# Patient Record
Sex: FEMALE | Race: WHITE | NOT HISPANIC OR LATINO | Employment: FULL TIME | URBAN - METROPOLITAN AREA
[De-identification: names, ages, dates, MRNs, and addresses within clinical notes are randomized per-mention and may not be internally consistent; named-entity substitution may affect disease eponyms.]

---

## 2017-04-05 ENCOUNTER — APPOINTMENT (OUTPATIENT)
Dept: GENERAL RADIOLOGY | Facility: CLINIC | Age: 37
End: 2017-04-05
Attending: EMERGENCY MEDICINE
Payer: OTHER MISCELLANEOUS

## 2017-04-05 ENCOUNTER — HOSPITAL ENCOUNTER (EMERGENCY)
Facility: CLINIC | Age: 37
Discharge: HOME | End: 2017-04-05
Attending: EMERGENCY MEDICINE | Admitting: EMERGENCY MEDICINE
Payer: OTHER MISCELLANEOUS

## 2017-04-05 VITALS
HEART RATE: 52 BPM | SYSTOLIC BLOOD PRESSURE: 108 MMHG | DIASTOLIC BLOOD PRESSURE: 59 MMHG | OXYGEN SATURATION: 100 % | TEMPERATURE: 98.6 F | RESPIRATION RATE: 16 BRPM

## 2017-04-05 DIAGNOSIS — S90.30XA CONTUSION OF FOOT, UNSPECIFIED LATERALITY, INITIAL ENCOUNTER: ICD-10-CM

## 2017-04-05 PROCEDURE — 73630 X-RAY EXAM OF FOOT: CPT | Mod: RT

## 2017-04-05 PROCEDURE — 99283 EMERGENCY DEPT VISIT LOW MDM: CPT

## 2017-04-05 NOTE — ED AVS SNAPSHOT
[facility] Emergency Department    [address]    [facility] [address]    Phone:  [phone]    Fax:  [phone]                                       Andressa Lazar   MRN: [id number]    Department:  [facility] Emergency Department   Date of Visit:  4/5/2017           After Visit Summary Signature Page     I have received my discharge instructions, and my questions have been answered. I have discussed any challenges I see with this plan with the nurse or doctor.    ..........................................................................................................................................  Patient/Patient Representative Signature      ..........................................................................................................................................  Patient Representative Print Name and Relationship to Patient    ..................................................               ................................................  Date                                            Time    ..........................................................................................................................................  Reviewed by Signature/Title    ...................................................              ..............................................  Date                                                            Time

## 2017-04-05 NOTE — LETTER
[facility] EMERGENCY DEPARTMENT  [address] [facility] [address]  [phone]    Andressa Lazar  [address] [facility] [address]  [phone] (home)     : 1980      To Whom it may concern:    Andressa Lazar was seen in our Emergency Department today, 2017.  I expect her condition to improve over the next 2 days.  She may return to work when improved.    Sincerely,        KAYA Lopez RN for KODI Hernandez MD

## 2017-04-05 NOTE — ED AVS SNAPSHOT
[facility] Emergency Department    [address] [place] [address]    Phone:  [phone]    Fax:  [phone]                                       Andressa Lazar   MRN: [id number]    Department:  [facility] Emergency Department   Date of Visit:  4/5/2017           Patient Information     Date Of Birth          1980        Your diagnoses for this visit were:     Contusion of foot, unspecified laterality, initial encounter        You were seen by Eusebio Hernandez MD.      Follow-up Information     Follow up with Clinic, [facility]. Call in 1 week.    Contact information:    [address]  [facility] [address]  [phone]          Discharge Instructions         Foot Contusion  You have a contusion. This is also called a bruise. There is swelling and some bleeding under the skin, but no broken bones. This injury generally takes a few days to a few weeks to heal.  During that time, the bruise will typically change in color from reddish, to purple-blue, to greenish-yellow, then to yellow-brown.  Home care    Elevate the foot to reduce pain and swelling. As much as possible, sit or lie down with the foot raised about the level of your heart. This is especially important during the first 48 hours.    Ice the foot to help reduce pain and swelling. Wrap a cold source (ice pack or ice cubes in a plastic bag) in a thin towel. Apply to the bruised area for 20 minutes every 1 to 2 hours the first day. Continue this 3 to 4 times a day until the pain and swelling goes away.    Unless another medication was prescribed, you can take acetaminophen, ibuprofen, or naproxen to control pain. (If you have chronic liver or kidney disease or ever had a stomach ulcer or GI bleeding, talk with your doctor before using these medicines.)  Follow up  Follow up with your health care provider or our staff as advised. Call if you are not improving within 1 to 2  weeks.  When to seek medical advice   Call your health care provider right away if you have any of the following:    Increased pain or swelling    Foot or leg becomes cold, blue, numb or tingly    Signs of infection: Warmth, drainage, or increased redness or pain around the bruise    Inability to move the injured foot     Frequent bruising for unknown reasons    5559-0953 [employer]. [address] [place] [address]. All rights reserved. This information is not intended as a substitute for professional medical care. Always follow your healthcare professional's instructions.        Discharge Instructions  Extremity Injury    You were seen today for an injury to an extremity (arm, hand, leg, or foot). You may have a bruise, strain, or fracture (broken bone).    Return to the Emergency Department or see your regular doctor if your injured area is not back to normal within 5-7 days.    Return to the Emergency Department right away if:    Your pain seems to change or get worse or there is pain in a new area.    Your extremity becomes pale, cool, blue, or numb or tingling past the injury.    You have more drainage, redness or pain in the area of the cut or abrasion.    You have pain that you can t control with the medicine recommended or prescribed here, or you have pain that seems too much for your injury.    Your child will not stop crying or is much more fussy than normal.    You have new symptoms or anything that worries you.    What to Expect:    Your swelling and pain may be worse the day after your injury, but should not be severe and should start getting better after that. You should not have new symptoms and your pain should not get worse.    You may start to get a bruise over the injured area or below the injured area.    Your movement and strength should get better with time.    Some injuries may not show up until after you have left the Emergency Department so it is important to  follow-up with your regular doctor.    Your injury may prevent you from working.  Follow-up with your regular doctor to get a work release note.    Pain medications or your injury may make it unsafe to drive or operate machinery.    Home Care:    Apply ice your injured area for 15 minutes at a time, at least 3 times a day. Use a cloth between the ice bag and your skin to prevent frostbite.     Do not sleep with an ice pack or heating pad on, since this can cause burns or skin injury.    Rest your injured area for at least 1-2 days. After that you may start using your extremity again as long as there is not too much pain.     Raise the injured area above the level of your heart as much as possible in the first 1-2 days.    Use Tylenol  (acetaminophen), Motrin (ibuprofen), or Advil  (ibuprofen) for your pain unless you have an allergy or are told not to use these medications by your doctor.  Take the medications as instructed on the package. Tylenol  (acetaminophen) is in many prescription medicines and non-prescription medicines--check all of your medicines to be sure you aren t taking more than 3000 mg per day.    You may use an elastic bandage (Ace  Wrap) if it makes you more comfortable. Wrap it just tight enough to provide light compression, like a new pair of socks feels. Loosen the bandage if you have swelling past the bandage.      Please follow any other instructions that were discussed with you by your doctor.    MORE INFORMATION:    X-rays:  X-rays done today were read by your doctor but will also be read by a radiologist.  We will contact you if the radiologist sees anything different on the x-ray.  Your regular doctor may also want to review your x-rays on follow-up.    You could have a fracture (break), even if we told you your x-rays were normal. X-rays are not always certain, and some fractures are hard to see and may not show up right away.  Also, your x-ray may look like you have a fracture, even  though you do not.  It is important to follow-up with your regular doctor.     Stretching:  If your injury was to your arm or shoulder and your doctor put you in a sling or an immobilizer, it is important that you take off your immobilizer within 3 days and stretch/move your shoulder, unless your doctor specifically tells you to not move your shoulder.  This is to prevent further injury such as a  frozen shoulder .     If you were given a prescription for medicine here today, be sure to read all of the information (including the package insert) that comes with your prescription.  This will include important information about the medicine, its side effects, and any warnings that you need to know about.  The pharmacist who fills the prescription can provide more information and answer questions you may have about the medicine.  If you have questions or concerns that the pharmacist cannot address, please call or return to the Emergency Department.     Opioid Medication Information    Pain medications are among the most commonly prescribed medicines, so we are including this information for all our patients. If you did not receive pain medication or get a prescription for pain medicine, you can ignore it.     You may have been given a prescription for an opioid (narcotic) pain medicine and/or have received a pain medicine while here in the Emergency Department. These medicines can make you drowsy or impaired. You must not drive, operate dangerous equipment, or engage in any other dangerous activities while taking these medications. If you drive while taking these medications, you could be arrested for DUI, or driving under the influence. Do not drink any alcohol while you are taking these medications.     Opioid pain medications can cause addiction. If you have a history of chemical dependency of any type, you are at a higher risk of becoming addicted to pain medications.  Only take these prescribed medications to treat  your pain when all other options have been tried. Take it for as short a time and as few doses as possible. Store your pain pills in a secure place, as they are frequently stolen and provide a dangerous opportunity for children or visitors in your house to start abusing these powerful medications. We will not replace any lost or stolen medicine.  As soon as your pain is better, you should flush all your remaining medication.     Many prescription pain medications contain Tylenol  (acetaminophen), including Vicodin , Tylenol #3 , Norco , Lortab , and Percocet .  You should not take any extra pills of Tylenol  if you are using these prescription medications or you can get very sick.  Do not ever take more than 3000 mg of acetaminophen in any 24 hour period.    All opioids tend to cause constipation. Drink plenty of water and eat foods that have a lot of fiber, such as fruits, vegetables, prune juice, apple juice and high fiber cereal.  Take a laxative if you don t move your bowels at least every other day. Miralax , Milk of Magnesia, Colace , or Senna  can be used to keep you regular.      Remember that you can always come back to the Emergency Department if you are not able to see your regular doctor in the amount of time listed above, if you get any new symptoms, or if there is anything that worries you.        24 Hour Appointment Hotline       To make an appointment at any [facility], call [phone] ([phone]). If you don't have a family doctor or clinic, we will help you find one. [facility] clinics are conveniently located to serve the needs of you and your family.             Review of your medicines      Notice     You have not been prescribed any medications.            Procedures and tests performed during your visit     Foot  XR, G/E 3 views, right      Orders Needing Specimen Collection     None      Pending Results     Date and Time Order Name Status Description    4/5/2017 2121 Foot  XR, G/E  3 views, right Preliminary             Pending Culture Results     No orders found from 4/3/2017 to 4/6/2017.            Test Results From Your Hospital Stay        4/5/2017 11:07 PM      Narrative     XR FOOT RT G/E 3 VW  4/5/2017 11:00 PM      HISTORY: Dropped metal panel on foot.     COMPARISON: None.        Impression     IMPRESSION: No acute fracture or dislocation.                Clinical Quality Measure: Blood Pressure Screening     Your blood pressure was checked while you were in the emergency department today. The last reading we obtained was  BP: 108/59 . Please read the guidelines below about what these numbers mean and what you should do about them.  If your systolic blood pressure (the top number) is less than 120 and your diastolic blood pressure (the bottom number) is less than 80, then your blood pressure is normal. There is nothing more that you need to do about it.  If your systolic blood pressure (the top number) is 120-139 or your diastolic blood pressure (the bottom number) is 80-89, your blood pressure may be higher than it should be. You should have your blood pressure rechecked within a year by a primary care provider.  If your systolic blood pressure (the top number) is 140 or greater or your diastolic blood pressure (the bottom number) is 90 or greater, you may have high blood pressure. High blood pressure is treatable, but if left untreated over time it can put you at risk for heart attack, stroke, or kidney failure. You should have your blood pressure rechecked by a primary care provider within the next 4 weeks.  If your provider in the emergency department today gave you specific instructions to follow-up with your doctor or provider even sooner than that, you should follow that instruction and not wait for up to 4 weeks for your follow-up visit.        Thank you for choosing [facility]       Thank you for choosing [facility] for your care. Our goal is always to provide you with excellent  "care. Hearing back from our patients is one way we can continue to improve our services. Please take a few minutes to complete the written survey that you may receive in the mail after you visit with us. Thank you!        [employer] Information     [employer] lets you send messages to your doctor, view your test results, renew your prescriptions, schedule appointments and more. To sign up, go to [email or web address] . Click on \"Log in\" on the left side of the screen, which will take you to the Welcome page. Then click on \"Sign up Now\" on the right side of the page.     You will be asked to enter the access code listed below, as well as some personal information. Please follow the directions to create your username and password.     Your access code is: [email or web address]  Expires: 2017 11:21 PM     Your access code will  in 90 days. If you need help or a new code, please call your [facility] clinic or [phone].        Care EveryWhere ID     This is your Care EveryWhere ID. This could be used by other organizations to access your [facility] medical records  [id number]        After Visit Summary       This is your record. Keep this with you and show to your community pharmacist(s) and doctor(s) at your next visit.                  "

## 2017-04-06 NOTE — ED PROVIDER NOTES
History     Chief Complaint:  Foot Injury      HPI   Andressa Lazar is a 36 year old female who presents with right foot injury. The patient states that she dropped a steel panel on her right foot while at work this evening. The patient is able to walk.    Allergies:  No known drug allergies.    Medications:    The patient is currently on no regular medications.    Past Medical History:    History reviewed.  No significant past medical history.     Past Surgical History:    History reviewed. No pertinent past surgical history.    Family History:    History reviewed. No pertinent family history.    Social History:  Marital Status: Single  Presents to the ED alone  [facility]     Review of Systems   Musculoskeletal:        Positive for right foot pain.   All other systems reviewed and are negative.    Physical Exam   First Vitals:  BP: 108/59  Pulse: 52  Temp: 98.6  F (37  C)  Resp: 16  SpO2: 100 %    Physical Exam  Constitutional:  Oriented to person, place, and time. Well appearing.  HENT:   Head:    Normocephalic.   Mouth/Throat:   Oropharynx is clear and moist.   Eyes:    EOM are normal. Pupils are equal, round, and reactive to light.   Neck:    Neck supple.   Cardiovascular:  Normal rate, regular rhythm and normal heart sounds.      Exam reveals no gallop and no friction rub.       No murmur heard.  Pulmonary/Chest:  Effort normal and breath sounds normal.      No respiratory distress. No wheezes. No rales.      No reproducible chest wall pain.  Abdominal:   Soft. No distension. No tenderness. No rebound and no guarding.   Musculoskeletal:  Normal range of motion.     2+ distal pulses.     Tenderness to dorsum of right foot, 2-4 MTP, full range of motion  Neurological:   Right foot neurovascularly intact.     Alert and oriented to person, place, and time.           Moves all 4 extremities spontaneously    Skin:    Mild ecchymosis to right foot.     No rash noted. No  pallor.    Emergency Department Course   Imaging:  Radiographic findings were communicated with the patient who voiced understanding of the findings.    Foot  XR, G/E 3 views, right   Preliminary Result   IMPRESSION: No acute fracture or dislocation.        ED Course:  Nursing notes and past medical history reviewed.   I performed a physical examination of the patient as documented above.  I explained the plan with the patient who consents to this.   The patient was sent for a right foot x-ray while in the emergency department, findings above.   Findings and plan explained to the patient. Patient discharged home with instructions regarding supportive care, medications, and reasons to return. The importance of close follow-up was reviewed.     Impression & Plan    Medical Decision Making:  Andressa Lazar is a 36 year old female presents for evaluation after right foot injury.  Signs and symptoms are consistent with a contusion.  A broad differential was considered including sprain, strain, fracture, tendon rupture, nerve impingement/compromise, referred pain. Supportive outpatient management is indicated.  Rest, ice, and elevation treatment was discussed with the patient. The patients head to toe trauma exam is otherwise negative for serious underlying disease of the head, neck, chest, abdomen, extremities, pelvis.  Close follow-up with patient's primary care physician per discharge precautions. Contusion discharge instructions given for home.    Diagnosis:    ICD-10-CM    1. Contusion of foot, unspecified laterality, initial encounter S90.30XA        Disposition:   Discharge to home.    Albin JAUREGUI, am serving as a scribe on 4/5/2017 at 10:50 PM to personally document services performed by Eusebio Hernandez MD, based on my observations and the provider's statements to me.       Eusebio Hernandez MD  04/05/17 5558

## 2017-04-06 NOTE — DISCHARGE INSTRUCTIONS
Foot Contusion  You have a contusion. This is also called a bruise. There is swelling and some bleeding under the skin, but no broken bones. This injury generally takes a few days to a few weeks to heal.  During that time, the bruise will typically change in color from reddish, to purple-blue, to greenish-yellow, then to yellow-brown.  Home care    Elevate the foot to reduce pain and swelling. As much as possible, sit or lie down with the foot raised about the level of your heart. This is especially important during the first 48 hours.    Ice the foot to help reduce pain and swelling. Wrap a cold source (ice pack or ice cubes in a plastic bag) in a thin towel. Apply to the bruised area for 20 minutes every 1 to 2 hours the first day. Continue this 3 to 4 times a day until the pain and swelling goes away.    Unless another medication was prescribed, you can take acetaminophen, ibuprofen, or naproxen to control pain. (If you have chronic liver or kidney disease or ever had a stomach ulcer or GI bleeding, talk with your doctor before using these medicines.)  Follow up  Follow up with your health care provider or our staff as advised. Call if you are not improving within 1 to 2 weeks.  When to seek medical advice   Call your health care provider right away if you have any of the following:    Increased pain or swelling    Foot or leg becomes cold, blue, numb or tingly    Signs of infection: Warmth, drainage, or increased redness or pain around the bruise    Inability to move the injured foot     Frequent bruising for unknown reasons    3331-6237 [employer]. [address] [place] [address], [place] [address]. All rights reserved. This information is not intended as a substitute for professional medical care. Always follow your healthcare professional's instructions.        Discharge Instructions  Extremity Injury    You were seen today for an injury to an extremity (arm, hand, leg, or foot). You may have a  bruise, strain, or fracture (broken bone).    Return to the Emergency Department or see your regular doctor if your injured area is not back to normal within 5-7 days.    Return to the Emergency Department right away if:    Your pain seems to change or get worse or there is pain in a new area.    Your extremity becomes pale, cool, blue, or numb or tingling past the injury.    You have more drainage, redness or pain in the area of the cut or abrasion.    You have pain that you can t control with the medicine recommended or prescribed here, or you have pain that seems too much for your injury.    Your child will not stop crying or is much more fussy than normal.    You have new symptoms or anything that worries you.    What to Expect:    Your swelling and pain may be worse the day after your injury, but should not be severe and should start getting better after that. You should not have new symptoms and your pain should not get worse.    You may start to get a bruise over the injured area or below the injured area.    Your movement and strength should get better with time.    Some injuries may not show up until after you have left the Emergency Department so it is important to follow-up with your regular doctor.    Your injury may prevent you from working.  Follow-up with your regular doctor to get a work release note.    Pain medications or your injury may make it unsafe to drive or operate machinery.    Home Care:    Apply ice your injured area for 15 minutes at a time, at least 3 times a day. Use a cloth between the ice bag and your skin to prevent frostbite.     Do not sleep with an ice pack or heating pad on, since this can cause burns or skin injury.    Rest your injured area for at least 1-2 days. After that you may start using your extremity again as long as there is not too much pain.     Raise the injured area above the level of your heart as much as possible in the first 1-2 days.    Use Tylenol   (acetaminophen), Motrin (ibuprofen), or Advil  (ibuprofen) for your pain unless you have an allergy or are told not to use these medications by your doctor.  Take the medications as instructed on the package. Tylenol  (acetaminophen) is in many prescription medicines and non-prescription medicines--check all of your medicines to be sure you aren t taking more than 3000 mg per day.    You may use an elastic bandage (Ace  Wrap) if it makes you more comfortable. Wrap it just tight enough to provide light compression, like a new pair of socks feels. Loosen the bandage if you have swelling past the bandage.      Please follow any other instructions that were discussed with you by your doctor.    MORE INFORMATION:    X-rays:  X-rays done today were read by your doctor but will also be read by a radiologist.  We will contact you if the radiologist sees anything different on the x-ray.  Your regular doctor may also want to review your x-rays on follow-up.    You could have a fracture (break), even if we told you your x-rays were normal. X-rays are not always certain, and some fractures are hard to see and may not show up right away.  Also, your x-ray may look like you have a fracture, even though you do not.  It is important to follow-up with your regular doctor.     Stretching:  If your injury was to your arm or shoulder and your doctor put you in a sling or an immobilizer, it is important that you take off your immobilizer within 3 days and stretch/move your shoulder, unless your doctor specifically tells you to not move your shoulder.  This is to prevent further injury such as a  frozen shoulder .     If you were given a prescription for medicine here today, be sure to read all of the information (including the package insert) that comes with your prescription.  This will include important information about the medicine, its side effects, and any warnings that you need to know about.  The pharmacist who fills the  prescription can provide more information and answer questions you may have about the medicine.  If you have questions or concerns that the pharmacist cannot address, please call or return to the Emergency Department.     Opioid Medication Information    Pain medications are among the most commonly prescribed medicines, so we are including this information for all our patients. If you did not receive pain medication or get a prescription for pain medicine, you can ignore it.     You may have been given a prescription for an opioid (narcotic) pain medicine and/or have received a pain medicine while here in the Emergency Department. These medicines can make you drowsy or impaired. You must not drive, operate dangerous equipment, or engage in any other dangerous activities while taking these medications. If you drive while taking these medications, you could be arrested for DUI, or driving under the influence. Do not drink any alcohol while you are taking these medications.     Opioid pain medications can cause addiction. If you have a history of chemical dependency of any type, you are at a higher risk of becoming addicted to pain medications.  Only take these prescribed medications to treat your pain when all other options have been tried. Take it for as short a time and as few doses as possible. Store your pain pills in a secure place, as they are frequently stolen and provide a dangerous opportunity for children or visitors in your house to start abusing these powerful medications. We will not replace any lost or stolen medicine.  As soon as your pain is better, you should flush all your remaining medication.     Many prescription pain medications contain Tylenol  (acetaminophen), including Vicodin , Tylenol #3 , Norco , Lortab , and Percocet .  You should not take any extra pills of Tylenol  if you are using these prescription medications or you can get very sick.  Do not ever take more than 3000 mg of  acetaminophen in any 24 hour period.    All opioids tend to cause constipation. Drink plenty of water and eat foods that have a lot of fiber, such as fruits, vegetables, prune juice, apple juice and high fiber cereal.  Take a laxative if you don t move your bowels at least every other day. Miralax , Milk of Magnesia, Colace , or Senna  can be used to keep you regular.      Remember that you can always come back to the Emergency Department if you are not able to see your regular doctor in the amount of time listed above, if you get any new symptoms, or if there is anything that worries you.

## 2019-03-18 LAB
HBV SURFACE AG SERPL QL IA: NORMAL
HIV 1+2 AB+HIV1 P24 AG SERPL QL IA: NEGATIVE
RUBELLA ANTIBODY IGG QUANTITATIVE: NORMAL IU/ML
TREPONEMA ANTIBODIES: NEGATIVE

## 2019-09-06 LAB — GROUP B STREP PCR: NEGATIVE

## 2019-09-25 ENCOUNTER — HOSPITAL ENCOUNTER (INPATIENT)
Facility: CLINIC | Age: 39
LOS: 5 days | Discharge: HOME | End: 2019-09-30
Attending: OBSTETRICS & GYNECOLOGY | Admitting: OBSTETRICS & GYNECOLOGY
Payer: COMMERCIAL

## 2019-09-25 PROBLEM — O24.419 GESTATIONAL DIABETES MELLITUS (GDM) IN THIRD TRIMESTER: Status: ACTIVE | Noted: 2019-09-25

## 2019-09-25 LAB
ABO + RH BLD: NORMAL
ABO + RH BLD: NORMAL
AMPHETAMINES UR QL SCN: NEGATIVE
CANNABINOIDS UR QL: NEGATIVE
COCAINE UR QL: NEGATIVE
GLUCOSE BLDC GLUCOMTR-MCNC: 100 MG/DL (ref 70–99)
GLUCOSE BLDC GLUCOMTR-MCNC: 123 MG/DL (ref 70–99)
GLUCOSE BLDC GLUCOMTR-MCNC: 130 MG/DL (ref 70–99)
OPIATES UR QL SCN: NEGATIVE
PCP UR QL SCN: NEGATIVE
SPECIMEN EXP DATE BLD: NORMAL

## 2019-09-25 PROCEDURE — 00000146 ZZHCL STATISTIC GLUCOSE BY METER IP

## 2019-09-25 PROCEDURE — 86900 BLOOD TYPING SEROLOGIC ABO: CPT | Performed by: OBSTETRICS & GYNECOLOGY

## 2019-09-25 PROCEDURE — 80307 DRUG TEST PRSMV CHEM ANLYZR: CPT | Performed by: OBSTETRICS & GYNECOLOGY

## 2019-09-25 PROCEDURE — 12000000 ZZH R&B MED SURG/OB

## 2019-09-25 PROCEDURE — 25000132 ZZH RX MED GY IP 250 OP 250 PS 637: Performed by: OBSTETRICS & GYNECOLOGY

## 2019-09-25 PROCEDURE — 86780 TREPONEMA PALLIDUM: CPT | Performed by: OBSTETRICS & GYNECOLOGY

## 2019-09-25 PROCEDURE — 25000131 ZZH RX MED GY IP 250 OP 636 PS 637: Performed by: OBSTETRICS & GYNECOLOGY

## 2019-09-25 PROCEDURE — 86901 BLOOD TYPING SEROLOGIC RH(D): CPT | Performed by: OBSTETRICS & GYNECOLOGY

## 2019-09-25 RX ORDER — DEXTROSE MONOHYDRATE 25 G/50ML
25-50 INJECTION, SOLUTION INTRAVENOUS
Status: CANCELLED | OUTPATIENT
Start: 2019-09-25

## 2019-09-25 RX ORDER — OXYCODONE AND ACETAMINOPHEN 5; 325 MG/1; MG/1
1 TABLET ORAL
Status: DISCONTINUED | OUTPATIENT
Start: 2019-09-25 | End: 2019-09-28

## 2019-09-25 RX ORDER — ALBUTEROL SULFATE 90 UG/1
1-2 AEROSOL, METERED RESPIRATORY (INHALATION)
COMMUNITY
Start: 2018-10-10 | End: 2024-07-30

## 2019-09-25 RX ORDER — IBUPROFEN 800 MG/1
800 TABLET, FILM COATED ORAL
Status: DISCONTINUED | OUTPATIENT
Start: 2019-09-25 | End: 2019-09-28

## 2019-09-25 RX ORDER — FLUTICASONE PROPIONATE 50 MCG
1-2 SPRAY, SUSPENSION (ML) NASAL
COMMUNITY
Start: 2015-11-13 | End: 2024-07-30

## 2019-09-25 RX ORDER — DEXTROSE, SODIUM CHLORIDE, SODIUM LACTATE, POTASSIUM CHLORIDE, AND CALCIUM CHLORIDE 5; .6; .31; .03; .02 G/100ML; G/100ML; G/100ML; G/100ML; G/100ML
INJECTION, SOLUTION INTRAVENOUS CONTINUOUS
Status: CANCELLED | OUTPATIENT
Start: 2019-09-25

## 2019-09-25 RX ORDER — ONDANSETRON 2 MG/ML
4 INJECTION INTRAMUSCULAR; INTRAVENOUS EVERY 6 HOURS PRN
Status: DISCONTINUED | OUTPATIENT
Start: 2019-09-25 | End: 2019-09-29 | Stop reason: CLARIF

## 2019-09-25 RX ORDER — NICOTINE POLACRILEX 4 MG
15-30 LOZENGE BUCCAL
Status: DISCONTINUED | OUTPATIENT
Start: 2019-09-25 | End: 2019-09-27

## 2019-09-25 RX ORDER — METHYLERGONOVINE MALEATE 0.2 MG/ML
200 INJECTION INTRAVENOUS
Status: DISCONTINUED | OUTPATIENT
Start: 2019-09-25 | End: 2019-09-29 | Stop reason: CLARIF

## 2019-09-25 RX ORDER — MISOPROSTOL 100 UG/1
25 TABLET ORAL
Status: DISCONTINUED | OUTPATIENT
Start: 2019-09-25 | End: 2019-09-27

## 2019-09-25 RX ORDER — OXYTOCIN/0.9 % SODIUM CHLORIDE 30/500 ML
100-340 PLASTIC BAG, INJECTION (ML) INTRAVENOUS CONTINUOUS PRN
Status: DISCONTINUED | OUTPATIENT
Start: 2019-09-25 | End: 2019-09-28

## 2019-09-25 RX ORDER — SODIUM CHLORIDE 9 MG/ML
INJECTION, SOLUTION INTRAVENOUS CONTINUOUS
Status: CANCELLED | OUTPATIENT
Start: 2019-09-25

## 2019-09-25 RX ORDER — NALOXONE HYDROCHLORIDE 0.4 MG/ML
.1-.4 INJECTION, SOLUTION INTRAMUSCULAR; INTRAVENOUS; SUBCUTANEOUS
Status: DISCONTINUED | OUTPATIENT
Start: 2019-09-25 | End: 2019-09-29 | Stop reason: CLARIF

## 2019-09-25 RX ORDER — SODIUM CHLORIDE, SODIUM LACTATE, POTASSIUM CHLORIDE, CALCIUM CHLORIDE 600; 310; 30; 20 MG/100ML; MG/100ML; MG/100ML; MG/100ML
INJECTION, SOLUTION INTRAVENOUS CONTINUOUS
Status: DISCONTINUED | OUTPATIENT
Start: 2019-09-25 | End: 2019-09-28

## 2019-09-25 RX ORDER — DEXTROSE MONOHYDRATE 25 G/50ML
25-50 INJECTION, SOLUTION INTRAVENOUS
Status: DISCONTINUED | OUTPATIENT
Start: 2019-09-25 | End: 2019-09-27

## 2019-09-25 RX ORDER — NICOTINE POLACRILEX 4 MG
15-30 LOZENGE BUCCAL
Status: CANCELLED | OUTPATIENT
Start: 2019-09-25

## 2019-09-25 RX ORDER — CARBOPROST TROMETHAMINE 250 UG/ML
250 INJECTION, SOLUTION INTRAMUSCULAR
Status: DISCONTINUED | OUTPATIENT
Start: 2019-09-25 | End: 2019-09-29 | Stop reason: CLARIF

## 2019-09-25 RX ORDER — OXYTOCIN 10 [USP'U]/ML
10 INJECTION, SOLUTION INTRAMUSCULAR; INTRAVENOUS
Status: DISCONTINUED | OUTPATIENT
Start: 2019-09-25 | End: 2019-09-28

## 2019-09-25 RX ORDER — PRENATAL WITH FERROUS FUM AND FOLIC ACID 3080; 920; 120; 400; 22; 1.84; 3; 20; 10; 1; 12; 200; 27; 25; 2 [IU]/1; [IU]/1; MG/1; [IU]/1; MG/1; MG/1; MG/1; MG/1; MG/1; MG/1; UG/1; MG/1; MG/1; MG/1; MG/1
1 TABLET ORAL
COMMUNITY
Start: 2019-01-28 | End: 2024-07-30

## 2019-09-25 RX ORDER — FENTANYL CITRATE 50 UG/ML
50-100 INJECTION, SOLUTION INTRAMUSCULAR; INTRAVENOUS
Status: DISCONTINUED | OUTPATIENT
Start: 2019-09-25 | End: 2019-09-28

## 2019-09-25 RX ORDER — ACETAMINOPHEN 325 MG/1
650 TABLET ORAL EVERY 4 HOURS PRN
Status: DISCONTINUED | OUTPATIENT
Start: 2019-09-25 | End: 2019-09-28

## 2019-09-25 RX ADMIN — Medication 25 MCG: at 17:32

## 2019-09-25 RX ADMIN — INSULIN HUMAN 8 UNITS: 100 INJECTION, SOLUTION PARENTERAL at 17:58

## 2019-09-25 RX ADMIN — Medication 25 MCG: at 09:34

## 2019-09-25 RX ADMIN — Medication 25 MCG: at 15:30

## 2019-09-25 RX ADMIN — Medication 25 MCG: at 13:33

## 2019-09-25 RX ADMIN — Medication 25 MCG: at 19:38

## 2019-09-25 RX ADMIN — Medication 25 MCG: at 11:31

## 2019-09-25 RX ADMIN — Medication 25 MCG: at 21:54

## 2019-09-25 RX ADMIN — INSULIN HUMAN 76 UNITS: 100 INJECTION, SUSPENSION SUBCUTANEOUS at 22:25

## 2019-09-25 SDOH — HEALTH STABILITY: MENTAL HEALTH: HOW OFTEN DO YOU HAVE A DRINK CONTAINING ALCOHOL?: NEVER

## 2019-09-25 ASSESSMENT — MIFFLIN-ST. JEOR: SCORE: 1769.51

## 2019-09-25 NOTE — PROVIDER NOTIFICATION
09/25/19 0922   Provider Notification   Provider Name/Title Dr Araiza   Method of Notification In Department   Notification Reason Patient Arrived;Status Update;SVE;Uterine Activity     MD updated on SVE  Order for cytotec x12 doses

## 2019-09-25 NOTE — PLAN OF CARE
Data: [facility] presented to [facility]: 2019  8:06 AM.  Reason for maternal/fetal assessment is scheduled induction-ripening for GDM. Patient reports no complaints.  Patient is a .  Prenatal record reviewed. Pregnancy  has been complicated by gestational diabetes, insulin controlled and AMA-first pregnancy.  Gestational Age 39w1d. VSS. Fetal movement present. Patient denies uterine contractions, leaking of vaginal fluid/rupture of membranes, vaginal bleeding, abdominal pain, pelvic pressure, vomiting, headache, visual disturbances, epigastric or URQ pain, significant edema. Support person is present-significant other Giuliano.  Action: Verbal consent for EFM. Triage assessment completed. Bill of rights reviewed.  Response: Patient verbalized agreement with plan. Will contact Dr Geno Araiza with update and further orders.

## 2019-09-25 NOTE — PROGRESS NOTES
Reviewed shoulder dystocia in detail including her risk factors, what would happen if the situation would occur, and the precautions we would have in place and that overall it is not possible to predict or prevent them from occurring but that we try to be prepared as best we can be    Ashley Hieronimus, MD [facility]  9/25/2019 1:32 PM

## 2019-09-25 NOTE — H&P
[facility] Labor and Delivery History and Physical    Andressa Lazar MRN# [id number]   Age: 38 year old YOB: 1980     Date of Admission:  2019         CC:    Here for induction of labor         HPI:   Andressa Lazar is a 38 year old  at 39w1d by LMP c/w 7w6d US who presents today for scheduled induction of labor secondary to GDMA2. Patient was scheduled to come in last night for cervical ripening but was pushed back until today secondary to L&D being too busy to accommodate her last night. She feels well today. Denies contractions, vaginal bleeding or loss of fluid. Normal fetal movement. Beyond diabetes, pregnancy is additionally complicated by AMA and obesity.          Pregnancy history:     OBSTETRIC HISTORY:    OB History    Para Term  AB Living   1 0 0 0 0 0   SAB TAB Ectopic Multiple Live Births   0 0 0 0 0      # Outcome Date GA Lbr Miles/2nd Weight Sex Delivery Anes PTL Lv   1 Current                Prenatal Labs:   Blood Type: A+  Rubella: Immune  HIV: NR  Hep B Surface Ag: NR  Syphilis: NR x 2  GCT: Failed, also failed GCT  Last Hgb: 12.6 on 2019    GBS Status:   Negative    Medication Prior to Admission  No medications prior to admission.   .        Maternal Past Medical History:     Past Medical History:   Diagnosis Date     Diabetes (H)     Gestational on insulin     Uncomplicated asthma     in the past                       Family History:   History reviewed. No pertinent family history.         Social History:     Social History     Tobacco Use     Smoking status: Former Smoker     Packs/day: 0.00     Smokeless tobacco: Never Used     Tobacco comment: quit in early pregnancy   Substance Use Topics     Alcohol use: Never     Frequency: Never     Drug use: Yes     Types: Marijuana     Comment: quit THC in early pregnancy            Review of Systems:   Negative except as noted above in the HPI         Physical Exam:     Vitals:    19 0843 19  "0846   BP:  118/61   Resp: 18    Temp: 98.1  F (36.7  C)    TempSrc: Oral    Weight: 108.9 kg (240 lb)    Height: 1.651 m (5' 5\")      Cardio: RRR  Resp: No m/g/r  Abdomen: gravid, soft, nt  Cervix: 0/50/-3   Presentation: Cephalic by BPP US on   Membranes: Intact  Fetal Heart Rate Tracin, moderate, no decels  Tocometer: No contractions    Imaging:  Dating Ultrasound:   Date: 2019 GA: 7w6d ALIE: 10/1/2019 Findings: Single IUP, +FCA    Anatomy Ultrasound:   Date: 2019 GA: 24w2d ALIE: Same. Findings: Single living IUP. Normal anatomy. Normal fluid.  Placenta location: anterior    Last Growth Ultrasound  Date: 2019 GA: 36w6d EFW: 83.3 (3382 grams) AC>99%. Normal fluid        Assessment:   Andressa Lazar is a 38 year old  at 39w1d by LMP c/w 7w6d US admitted for IOL secondary to GDMA2. Pregnancy additionally c/b AMA and obesity.        Plan:   IOL: Cervix closed. Will proceed with ripening with PO misoprostol. Transition to Pitocin as able. Reviewed that IOL can be a long process so that she knows to be prepared for this possibility.  GDMA2: Plan home regimen during ripening. Transition to labor protocol when appropriate. Expect large baby. Shoulder dystocia risk discussed. Shoulder precautions at delivery.  Fetal status: Cat I, reactive.  GBS status: Negative  Pain management: Will want epidural at some point  Anticipate .    Ashley Hieronimus, MD [facility]  2019 8:37 AM                            "

## 2019-09-26 LAB
GLUCOSE BLDC GLUCOMTR-MCNC: 108 MG/DL (ref 70–99)
GLUCOSE BLDC GLUCOMTR-MCNC: 161 MG/DL (ref 70–99)
GLUCOSE BLDC GLUCOMTR-MCNC: 161 MG/DL (ref 70–99)
GLUCOSE BLDC GLUCOMTR-MCNC: 52 MG/DL (ref 70–99)
GLUCOSE BLDC GLUCOMTR-MCNC: 56 MG/DL (ref 70–99)
GLUCOSE BLDC GLUCOMTR-MCNC: 75 MG/DL (ref 70–99)
GLUCOSE BLDC GLUCOMTR-MCNC: 81 MG/DL (ref 70–99)
GLUCOSE BLDC GLUCOMTR-MCNC: 95 MG/DL (ref 70–99)
GLUCOSE BLDC GLUCOMTR-MCNC: 96 MG/DL (ref 70–99)
T PALLIDUM AB SER QL: NONREACTIVE

## 2019-09-26 PROCEDURE — 25000131 ZZH RX MED GY IP 250 OP 636 PS 637: Performed by: OBSTETRICS & GYNECOLOGY

## 2019-09-26 PROCEDURE — 00000146 ZZHCL STATISTIC GLUCOSE BY METER IP

## 2019-09-26 PROCEDURE — 25000132 ZZH RX MED GY IP 250 OP 250 PS 637: Performed by: OBSTETRICS & GYNECOLOGY

## 2019-09-26 PROCEDURE — 12000000 ZZH R&B MED SURG/OB

## 2019-09-26 PROCEDURE — 3E0P7VZ INTRODUCTION OF HORMONE INTO FEMALE REPRODUCTIVE, VIA NATURAL OR ARTIFICIAL OPENING: ICD-10-PCS | Performed by: OBSTETRICS & GYNECOLOGY

## 2019-09-26 RX ORDER — DIPHENHYDRAMINE HCL 25 MG
50 CAPSULE ORAL
Status: DISCONTINUED | OUTPATIENT
Start: 2019-09-26 | End: 2019-09-27

## 2019-09-26 RX ADMIN — INSULIN HUMAN 8 UNITS: 100 INJECTION, SOLUTION PARENTERAL at 18:34

## 2019-09-26 RX ADMIN — Medication 25 MCG: at 06:06

## 2019-09-26 RX ADMIN — DIPHENHYDRAMINE HYDROCHLORIDE 50 MG: 25 CAPSULE ORAL at 00:17

## 2019-09-26 RX ADMIN — Medication 25 MCG: at 04:15

## 2019-09-26 RX ADMIN — INSULIN HUMAN 4 UNITS: 100 INJECTION, SOLUTION PARENTERAL at 09:03

## 2019-09-26 RX ADMIN — Medication 25 MCG: at 02:00

## 2019-09-26 RX ADMIN — Medication 25 MCG: at 00:08

## 2019-09-26 NOTE — PROVIDER NOTIFICATION
09/26/19 1555   Provider Notification   Provider Name/Title Dr. Abreu    Method of Notification In Department   Notification Reason Lab/Diagnostic Study   Dr. Abreu updated regarding repeat OT of 161, will continue to monitor and check OT before dinner.

## 2019-09-26 NOTE — PROVIDER NOTIFICATION
09/26/19 0736   Provider Notification   Provider Name/Title Dr. Abreu   Method of Notification At Bedside   Notification Reason Status Update;SVE   Dr. Abreu at the bedside to see patient, SVE and discuss the POC. Plan to have breakfast, shaower and walk then determine mode of ripening after.

## 2019-09-26 NOTE — PROVIDER NOTIFICATION
09/26/19 1049   Provider Notification   Provider Name/Title Dr. Abreu   Method of Notification At Bedside   Notification Reason Other (Comment)   Dr. Abreu at the bedside for cook balloon placement

## 2019-09-26 NOTE — PROGRESS NOTES
[facility]    After verbal consent obtained, a sterile vaginal exam was done. The Cook cervical balloon catheter was inserted using the stylette through the os until the uterine balloon was inside the internal os. The uterine balloon was inflated with 60mL normal saline, and then brought back against the internal os. The vaginal balloon was inflated with 60mL normal saline. The patient tolerated the procedure well.      Brittney Abreu DO, DO

## 2019-09-26 NOTE — PROVIDER NOTIFICATION
09/26/19 1350   Provider Notification   Provider Name/Title Dr. Abreu   Method of Notification In Department   Notification Reason Status Update   Dr. Abreu updated regarding patient's contractions and pain. No new order's at this time, will continue to monitor

## 2019-09-26 NOTE — PLAN OF CARE
Pt off monitor and up walking in donnelly with . Pt does feel some ctx and rates them at 2/10 per numeric scale. Category I fetal tracing continues. Good fetal movement noted per pt.

## 2019-09-26 NOTE — PLAN OF CARE
Pt done walking and back on monitors. Ctx. Continue to palpate mild and pt. Denies increased pain.

## 2019-09-26 NOTE — PLAN OF CARE
"Pt feeling \"flushed\" and \"odd\" BG spot check noted to be 52. Milk and turkey sandwich provided and found to be effective.   "

## 2019-09-26 NOTE — PROVIDER NOTIFICATION
09/26/19 1025   Provider Notification   Provider Name/Title Dr. Abreu   Method of Notification In Department   Notification Reason Status Update   Dr Abreu in department and updated regarding contractions q2-3 minutes. Plan for cook balloon placement.

## 2019-09-27 ENCOUNTER — ANESTHESIA (OUTPATIENT)
Dept: OBGYN | Facility: CLINIC | Age: 39
End: 2019-09-27
Payer: COMMERCIAL

## 2019-09-27 ENCOUNTER — ANESTHESIA EVENT (OUTPATIENT)
Dept: OBGYN | Facility: CLINIC | Age: 39
End: 2019-09-27
Payer: COMMERCIAL

## 2019-09-27 LAB
GLUCOSE BLDC GLUCOMTR-MCNC: 110 MG/DL (ref 70–99)
GLUCOSE BLDC GLUCOMTR-MCNC: 112 MG/DL (ref 70–99)
GLUCOSE BLDC GLUCOMTR-MCNC: 113 MG/DL (ref 70–99)
GLUCOSE BLDC GLUCOMTR-MCNC: 119 MG/DL (ref 70–99)
GLUCOSE BLDC GLUCOMTR-MCNC: 123 MG/DL (ref 70–99)
GLUCOSE BLDC GLUCOMTR-MCNC: 129 MG/DL (ref 70–99)
GLUCOSE BLDC GLUCOMTR-MCNC: 143 MG/DL (ref 70–99)
GLUCOSE BLDC GLUCOMTR-MCNC: 88 MG/DL (ref 70–99)
GLUCOSE BLDC GLUCOMTR-MCNC: 93 MG/DL (ref 70–99)
GLUCOSE BLDC GLUCOMTR-MCNC: 99 MG/DL (ref 70–99)
GLUCOSE BLDC GLUCOMTR-MCNC: 99 MG/DL (ref 70–99)

## 2019-09-27 PROCEDURE — 00HU33Z INSERTION OF INFUSION DEVICE INTO SPINAL CANAL, PERCUTANEOUS APPROACH: ICD-10-PCS | Performed by: ANESTHESIOLOGY

## 2019-09-27 PROCEDURE — 10907ZC DRAINAGE OF AMNIOTIC FLUID, THERAPEUTIC FROM PRODUCTS OF CONCEPTION, VIA NATURAL OR ARTIFICIAL OPENING: ICD-10-PCS | Performed by: OBSTETRICS & GYNECOLOGY

## 2019-09-27 PROCEDURE — 25000128 H RX IP 250 OP 636

## 2019-09-27 PROCEDURE — 25000125 ZZHC RX 250: Performed by: OBSTETRICS & GYNECOLOGY

## 2019-09-27 PROCEDURE — 25800030 ZZH RX IP 258 OP 636: Performed by: OBSTETRICS & GYNECOLOGY

## 2019-09-27 PROCEDURE — 00000146 ZZHCL STATISTIC GLUCOSE BY METER IP

## 2019-09-27 PROCEDURE — 40000671 ZZH STATISTIC ANESTHESIA CASE

## 2019-09-27 PROCEDURE — 25000132 ZZH RX MED GY IP 250 OP 250 PS 637: Performed by: OBSTETRICS & GYNECOLOGY

## 2019-09-27 PROCEDURE — 27110038 ZZH RX 271

## 2019-09-27 PROCEDURE — 25800030 ZZH RX IP 258 OP 636

## 2019-09-27 PROCEDURE — 25000128 H RX IP 250 OP 636: Performed by: OBSTETRICS & GYNECOLOGY

## 2019-09-27 PROCEDURE — 37000011 ZZH ANESTHESIA WARD SERVICE

## 2019-09-27 PROCEDURE — 3E0R3BZ INTRODUCTION OF ANESTHETIC AGENT INTO SPINAL CANAL, PERCUTANEOUS APPROACH: ICD-10-PCS | Performed by: ANESTHESIOLOGY

## 2019-09-27 PROCEDURE — 12000000 ZZH R&B MED SURG/OB

## 2019-09-27 RX ORDER — NICOTINE POLACRILEX 4 MG
15-30 LOZENGE BUCCAL
Status: DISCONTINUED | OUTPATIENT
Start: 2019-09-27 | End: 2019-09-28

## 2019-09-27 RX ORDER — OXYTOCIN/0.9 % SODIUM CHLORIDE 30/500 ML
1-24 PLASTIC BAG, INJECTION (ML) INTRAVENOUS CONTINUOUS
Status: DISCONTINUED | OUTPATIENT
Start: 2019-09-27 | End: 2019-09-28

## 2019-09-27 RX ORDER — NALOXONE HYDROCHLORIDE 0.4 MG/ML
.1-.4 INJECTION, SOLUTION INTRAMUSCULAR; INTRAVENOUS; SUBCUTANEOUS
Status: DISCONTINUED | OUTPATIENT
Start: 2019-09-27 | End: 2019-09-28

## 2019-09-27 RX ORDER — LIDOCAINE 40 MG/G
CREAM TOPICAL
Status: DISCONTINUED | OUTPATIENT
Start: 2019-09-27 | End: 2019-09-28

## 2019-09-27 RX ORDER — SODIUM CHLORIDE 9 MG/ML
INJECTION, SOLUTION INTRAVENOUS CONTINUOUS
Status: DISCONTINUED | OUTPATIENT
Start: 2019-09-27 | End: 2019-09-28 | Stop reason: CLARIF

## 2019-09-27 RX ORDER — ONDANSETRON 4 MG/1
4 TABLET, ORALLY DISINTEGRATING ORAL EVERY 6 HOURS PRN
Status: DISCONTINUED | OUTPATIENT
Start: 2019-09-27 | End: 2019-09-30 | Stop reason: HOSPADM

## 2019-09-27 RX ORDER — DEXTROSE, SODIUM CHLORIDE, SODIUM LACTATE, POTASSIUM CHLORIDE, AND CALCIUM CHLORIDE 5; .6; .31; .03; .02 G/100ML; G/100ML; G/100ML; G/100ML; G/100ML
INJECTION, SOLUTION INTRAVENOUS CONTINUOUS
Status: DISCONTINUED | OUTPATIENT
Start: 2019-09-27 | End: 2019-09-30 | Stop reason: HOSPADM

## 2019-09-27 RX ORDER — ONDANSETRON 2 MG/ML
4 INJECTION INTRAMUSCULAR; INTRAVENOUS EVERY 6 HOURS PRN
Status: DISCONTINUED | OUTPATIENT
Start: 2019-09-27 | End: 2019-09-30 | Stop reason: HOSPADM

## 2019-09-27 RX ORDER — NALBUPHINE HYDROCHLORIDE 10 MG/ML
2.5-5 INJECTION, SOLUTION INTRAMUSCULAR; INTRAVENOUS; SUBCUTANEOUS EVERY 6 HOURS PRN
Status: DISCONTINUED | OUTPATIENT
Start: 2019-09-27 | End: 2019-09-28

## 2019-09-27 RX ORDER — EPHEDRINE SULFATE 50 MG/ML
5 INJECTION, SOLUTION INTRAMUSCULAR; INTRAVENOUS; SUBCUTANEOUS
Status: DISCONTINUED | OUTPATIENT
Start: 2019-09-27 | End: 2019-09-28

## 2019-09-27 RX ORDER — DEXTROSE MONOHYDRATE 25 G/50ML
25-50 INJECTION, SOLUTION INTRAVENOUS
Status: DISCONTINUED | OUTPATIENT
Start: 2019-09-27 | End: 2019-09-28

## 2019-09-27 RX ORDER — BUPIVACAINE HCL/0.9 % NACL/PF 0.125 %
PLASTIC BAG, INJECTION (ML) EPIDURAL
Status: COMPLETED
Start: 2019-09-27 | End: 2019-09-27

## 2019-09-27 RX ORDER — EPHEDRINE SULFATE 50 MG/ML
INJECTION, SOLUTION INTRAMUSCULAR; INTRAVENOUS; SUBCUTANEOUS
Status: DISCONTINUED
Start: 2019-09-27 | End: 2019-09-28 | Stop reason: HOSPADM

## 2019-09-27 RX ORDER — HYDROMORPHONE HYDROCHLORIDE 1 MG/ML
INJECTION, SOLUTION INTRAMUSCULAR; INTRAVENOUS; SUBCUTANEOUS
Status: COMPLETED
Start: 2019-09-27 | End: 2019-09-27

## 2019-09-27 RX ORDER — BUPIVACAINE HCL/0.9 % NACL/PF 0.125 %
PLASTIC BAG, INJECTION (ML) EPIDURAL CONTINUOUS
Status: DISCONTINUED | OUTPATIENT
Start: 2019-09-27 | End: 2019-09-29 | Stop reason: CLARIF

## 2019-09-27 RX ADMIN — Medication: at 18:39

## 2019-09-27 RX ADMIN — DIPHENHYDRAMINE HYDROCHLORIDE 50 MG: 25 CAPSULE ORAL at 00:56

## 2019-09-27 RX ADMIN — ACETAMINOPHEN 650 MG: 325 TABLET, FILM COATED ORAL at 03:55

## 2019-09-27 RX ADMIN — SODIUM CHLORIDE, POTASSIUM CHLORIDE, SODIUM LACTATE AND CALCIUM CHLORIDE: 600; 310; 30; 20 INJECTION, SOLUTION INTRAVENOUS at 20:40

## 2019-09-27 RX ADMIN — Medication 2 MILLI-UNITS/MIN: at 18:04

## 2019-09-27 RX ADMIN — Medication: at 15:45

## 2019-09-27 RX ADMIN — SODIUM CHLORIDE, POTASSIUM CHLORIDE, SODIUM LACTATE AND CALCIUM CHLORIDE: 600; 310; 30; 20 INJECTION, SOLUTION INTRAVENOUS at 15:14

## 2019-09-27 RX ADMIN — HYDROMORPHONE HYDROCHLORIDE 0.5 MG: 1 INJECTION, SOLUTION INTRAMUSCULAR; INTRAVENOUS; SUBCUTANEOUS at 15:28

## 2019-09-27 RX ADMIN — SODIUM CHLORIDE, SODIUM LACTATE, POTASSIUM CHLORIDE, CALCIUM CHLORIDE AND DEXTROSE MONOHYDRATE: 5; 600; 310; 30; 20 INJECTION, SOLUTION INTRAVENOUS at 18:39

## 2019-09-27 RX ADMIN — DINOPROSTONE 10 MG: 10 INSERT VAGINAL at 00:56

## 2019-09-27 RX ADMIN — SODIUM CHLORIDE: 9 INJECTION, SOLUTION INTRAVENOUS at 18:40

## 2019-09-27 NOTE — ANESTHESIA PROCEDURE NOTES
Peripheral nerve/Neuraxial procedure note : epidural catheter  Pre-Procedure  Performed by Solomon Cardenas MD  Location: OB, floor    Procedure Times:9/27/2019 3:18 PM and 9/27/2019 3:34 PM  Pre-Anesthestic Checklist: patient identified, IV checked, risks and benefits discussed, informed consent, monitors and equipment checked, pre-op evaluation and at physician/surgeon's request    Timeout  Correct Patient: Yes   Correct Procedure: Yes   Correct Site: Yes   Correct Laterality: N/A   Correct Position: Yes   Site Marked: No   .   Procedure Documentation    .    Procedure: epidural catheter, .   Patient Position:sitting Insertion Site:L3-4  (midline approach) Injection technique: LORT saline   ELSY at 7 cm    Patient Prep/Sterile Barriers; mask, sterile gloves, povidone-iodine 7.5% surgical scrub, patient draped.  .  Needle: Touhy needle   Needle Gauge: 17.    Needle Length (Inches) 3.5   # of attempts: 1 and # of redirects:  .    Catheter: 19 G . .  Catheter threaded easily  5 cm epidural space.  12 cm at skin.   .    Assessment/Narrative  Paresthesias: No.  .  .  Aspiration negative for heme or CSF  . Test dose of 5 mL lidocaine 1.5% w/ 1:200,000 epinephrine at 15:28.  Test dose negative for signs of intravascular, subdural or intrathecal injection. Comments:  I or my partner am immediately available. I or my partner will monitor the patient and supervise nursing care at necessary intervals.    Given 10 ml 0.125% bupivicaine infusion with 0.5 mg hydromorphone.

## 2019-09-27 NOTE — PROVIDER NOTIFICATION
09/27/19 1300 09/27/19 1304   Vaginal Exam   Method sterile exam per physician  (Dr. Manning)  --    Vaginal Bleeding bloody show  --    Cervical Position anterior  --    Cervical Consistency soft  --    Cervical Dilation (cm) 1-2  --    Cervical Effacement 90%  --    Fetal Station -1  --    Membranes   Membrane Status  --  Ruptured    Sac Identifier  --  Sac 1   Rupture Type  --  AROM   Rupture Date  --  09/27/19   Rupture Time  --  1304   Amniotic Fluid Color  --  Clear   Amniotic Fluid Odor  --  Normal   Amniotic Fluid Amount  --  Small   Vaginal Bleeding   Vaginal Bleeding  --  present   Provider Notification   Provider Name/Title Dr. Manning   --    Method of Notification At Bedside  --    Request Evaluate in Person  --    Notification Reason SVE;Status Update  --      Will now begin active labor diabetes orders.  Will continue to monitor.

## 2019-09-27 NOTE — ANESTHESIA PREPROCEDURE EVALUATION
"Anesthesia Pre-Procedure Evaluation    Patient: Andressa Lazar   MRN: [id number] : 1980          Preoperative Diagnosis: * No surgery found *        Past Medical History:   Diagnosis Date     Diabetes (H)     Gestational on insulin     Uncomplicated asthma     in the past     Past Surgical History:   Procedure Laterality Date     ENT SURGERY      rhinoplasty     ORTHOPEDIC SURGERY      ulnar nerve repair-right arm     Anesthesia Evaluation       history and physical reviewed .             ROS/MED HX    ENT/Pulmonary:  - neg pulmonary ROS     Neurologic:  - neg neurologic ROS     Cardiovascular:  - neg cardiovascular ROS       METS/Exercise Tolerance:     Hematologic:         Musculoskeletal:         GI/Hepatic:  - neg GI/hepatic ROS       Renal/Genitourinary:         Endo:         Psychiatric:         Infectious Disease:         Malignancy:         Other:                     neg OB ROS            Physical Exam      Airway     Dental     Cardiovascular       Pulmonary     Other findings:  at term, in labor, requesting pain  management        No results found for: WBC, HGB, HCT, PLT, CRP, SED, NA, POTASSIUM, CHLORIDE, CO2, BUN, CR, GLC, NATALY, PHOS, MAG, ALBUMIN, PROTTOTAL, ALT, AST, GGT, ALKPHOS, BILITOTAL, BILIDIRECT, LIPASE, AMYLASE, CARMELINA, PTT, INR, FIBR, TSH, T4, T3, HCG, HCGS, CKTOTAL, CKMB, TROPN    Preop Vitals  BP Readings from Last 3 Encounters:   19 124/75   17 108/59    Pulse Readings from Last 3 Encounters:   17 52      Resp Readings from Last 3 Encounters:   19 20   17 16    SpO2 Readings from Last 3 Encounters:   17 100%      Temp Readings from Last 1 Encounters:   19 98  F (36.7  C) (Oral)    Ht Readings from Last 1 Encounters:   19 1.651 m (5' 5\")      Wt Readings from Last 1 Encounters:   19 108.9 kg (240 lb)    Estimated body mass index is 39.94 kg/m  as calculated from the following:    Height as of this encounter: 1.651 m (5' 5\").    " Weight as of this encounter: 108.9 kg (240 lb).       Anesthesia Plan      History & Physical Review      ASA Status:  2 .  OB Epidural Asa: 2       Plan for     Discussed risks of epidural catheter placement, including, but not limited to, bruising/bleeding, infection, pain, failure of epidural medications to relieve pain, dural puncture with subsequent headache/ need for epidural blood patch and nerve damage.  All questions answered, understanding voiced and she wishes to proceed.      Postoperative Care      Consents  Anesthetic plan, risks, benefits and alternatives discussed with:  Patient..                 Solomon Cardenas MD                    .

## 2019-09-27 NOTE — PLAN OF CARE
"Cervidil held at this time for recurrent late decelerations. Pts. S/O again upset that medication is being held and left room.    Education provided to pt along with strip review with pt. Discussing what a late is, why it happens and what interventions we use to treat.  Pt was then shown FH tracing s/p repositioning and discussed reactivity and ability to continue on with placing the Cervidil. Pt's s/o returned to the room and remains irritated and states, \"the incompetence here is amazing\". Attempts made, again, to answer questions for s/o and provide reassurance/comfort without improvement in s/o's temperament.  Offered to bring the charge RN in to talk with s/o and to have Dr. Bocanegra talk with pt and s/o. Both refused at this time by s/o.     Pt. Continues to remain calm and attempts to calm her s/o with some improvement in outbursts noted. Pt. Once again agrees with POC to place cervidil.     0100 -Cervidil placed.   "

## 2019-09-27 NOTE — PROGRESS NOTES
Cat 1 tracing  sve 1.5/90/-1 station. Anterior, soft.   bulging BOW  AROM done: clear fluid   Will continue to monitor for feto maternal wellbing.

## 2019-09-27 NOTE — PLAN OF CARE
Pt back on monitor after having a break  to BR and following removal of Cook Cath.    Pts significant other continues to be very upset and frustrated about the induction process.    Attempts made again, following Dr. Bocanegra, to educate pt and s/o about the process of cervical ripening. Questions answered.   Pt. Calm and verbalizes understanding of process. S/O continues to remain agitated.  Continued efforts to provide education, reassurance and comfort will continue.

## 2019-09-27 NOTE — PROGRESS NOTES
"PROGRESS NOTE    Pt doing well. Frustrated due to long process for cervical ripening. Pt's  very frustrated stating he does not understand why things are taking so long and why pt is being checked only every 12 hrs.     /73   Temp 97.7  F (36.5  C) (Oral)   Resp 18   Ht 1.651 m (5' 5\")   Wt 108.9 kg (240 lb)   LMP 2018   BMI 39.94 kg/m      SVE cook catheter balloon was removed and cervix evaluated 1.5/50/-4  toco ctxs q occasional rare   bpm, mod,a +, d -    A/P 38 year old  at 39w6d here for GDMA2 IOL  - ok to have break from monitoring  - ok to move around and have dinner  - BS checks per protocol, in light of very low BS overnight yesterday, will correct with sliding scale  - I explained to her and  cervical ripening process in detail and how this is not part of induction per se. Induction will start once pitocin is started. All questions were answered to best of my ability. Pt verbalized understanding and satisfied with explanation.  frustrated left the room.     Dr. Venkatesh Bocanegra  [phone]    "

## 2019-09-27 NOTE — PROGRESS NOTES
[facility] Antepartum Progress Note    Andressa Lazar MRN# [id number]   Age: 38 year old  Gestational age: 40w0d YOB: 1980       Date of Admission: 2019          Subjective:         PATIENT AND  IN THE ROOM AND DISCUSSED PLAN OF CARE AFTER CERVIDIL COMES OUT. EXPRESSED HER FRUSTRATION FOR PROLONGED IOL BUT WAS REASSURED AFTER TALKING TO ME AND UNDERSTANDING THE PROCESS.  Patient may get up and ambulate, she may also shower and eat lunch. The cervidil will be removed at 1 pm and then cervix reassessed for plan going forward with either pitocin or cytotec/cervidil for continued cervical ripening. Patient and  verbalized understanding of the plan or care and agreed to it.         Objective:          Patient Vitals for the past 12 hrs:   BP Temp Temp src Resp   19 0849 109/63 98.3  F (36.8  C) Oral 18   19 0014 125/76 98  F (36.7  C) -- 18     Temp:  [97.7  F (36.5  C)-98.3  F (36.8  C)] 98.3  F (36.8  C)  Resp:  [18] 18  BP: (109-132)/(63-85) 109/63      Gen: Well-appearing, NAD  Abdomen: Gravid, Soft, Non-tender   LE:  no edema, nontender.  Contractions: occasional  Fht: cat 1 tracing     LABS (Last ten results)   CBCNo lab results found in last 7 days.   No lab results found in last 7 days.   COAGSNo lab results found in last 7 days.    Invalid input(s): FIBRINOGEN              Assessment/Plan:          38 year old y.o.  at 40w0d admitted for medical iol following gdma2, ama         1) s/p cytotec, cook catheter and now on cervidil  2) reassuring fetal status  3) may shower and ambulate. Cervidil to come out around 1 pm.     Mayo Manning MD

## 2019-09-27 NOTE — PROVIDER NOTIFICATION
09/27/19 1702   Provider Notification   Provider Name/Title Dr Manning   Method of Notification Phone   Request Evaluate - Remote   Notification Reason SVE;Status Update;Uterine Activity   Dr Manning updated of patient now comfortable with epidural. FHTs now more reactive and LD resolved spontaneously. TORB for pitocin augmentation at this time.

## 2019-09-27 NOTE — PLAN OF CARE
Report received assumed care> Pt resting in bed with s/o at bedside. Pain is noted to be 3-4 with ctx. And pt states she is tolerating current pain level. Warm packs and repositioning completed to assist with comfort and found to be effective. Discussed POC with removal of Cook Cath at 2300 and pt agrees.  VSS. Continue to support.

## 2019-09-27 NOTE — PROVIDER NOTIFICATION
09/27/19 0900   Provider Notification   Provider Name/Title Dr. Manning   Method of Notification At Bedside   Request Evaluate in Person   Notification Reason Status Update     Dr. Manning at bedside to discuss POC.  Will take cervidil out and check cervix at that time.  No new orders received, will continue to monitor.

## 2019-09-27 NOTE — PLAN OF CARE
"Pt continues with cervical ripening and continues to maintain a good attitude despite the length of time ripening has continued.  Ctx. Continue and are \"more intense\" at times per pt. Ctx. Pain is noted to be a 2-3 per numeric scale and pt states she is able to rest through ctx. FHT's remain reactive overall.   Pt also has generalized body aches/pain which was treated with PO tylenol. Pt states pain is from lying in the labor bed. Additional pillows, warm packs and massage offered. All found to be helpful at times but pain is persistent.   VS and assessment remain within normal limits. Positive reinforcement provided as well as active listening to pts and s/o's frustrations about the duration of ripening.   Continue to monitor and support.   "

## 2019-09-27 NOTE — PROVIDER NOTIFICATION
09/27/19 0849   Point of Care Testing   Puncture Site fingertip   Bedside Glucose (mg/dl )  88 mg/dl   Blood Glucose Intervention Pt OK to eat breakfast   Dr Manning in department, OK for patient to eat light breakfast at this time.

## 2019-09-27 NOTE — PROVIDER NOTIFICATION
09/27/19 1742   Provider Notification   Provider Name/Title Dr Manning   Method of Notification Phone   Request Evaluate - Remote   Notification Reason SVE;Status Update   blood sugar 123, decision made to start IV insulin at this time. Pitocin held due to recurrent LD again, FHTs now WNL, will now start pitocin augmentation.

## 2019-09-28 LAB
ANION GAP SERPL CALCULATED.3IONS-SCNC: 7 MMOL/L (ref 3–14)
BUN SERPL-MCNC: 9 MG/DL (ref 7–30)
CALCIUM SERPL-MCNC: 8.5 MG/DL (ref 8.5–10.1)
CHLORIDE SERPL-SCNC: 110 MMOL/L (ref 94–109)
CO2 SERPL-SCNC: 24 MMOL/L (ref 20–32)
CREAT SERPL-MCNC: 0.7 MG/DL (ref 0.52–1.04)
GFR SERPL CREATININE-BSD FRML MDRD: >90 ML/MIN/{1.73_M2}
GLUCOSE BLDC GLUCOMTR-MCNC: 105 MG/DL (ref 70–99)
GLUCOSE BLDC GLUCOMTR-MCNC: 109 MG/DL (ref 70–99)
GLUCOSE BLDC GLUCOMTR-MCNC: 109 MG/DL (ref 70–99)
GLUCOSE BLDC GLUCOMTR-MCNC: 77 MG/DL (ref 70–99)
GLUCOSE BLDC GLUCOMTR-MCNC: 84 MG/DL (ref 70–99)
GLUCOSE BLDC GLUCOMTR-MCNC: 98 MG/DL (ref 70–99)
GLUCOSE SERPL-MCNC: 95 MG/DL (ref 70–99)
HBA1C MFR BLD: 5.2 % (ref 0–5.6)
KETONES BLD-SCNC: 0 MMOL/L (ref 0–0.6)
POTASSIUM SERPL-SCNC: 4.1 MMOL/L (ref 3.4–5.3)
SODIUM SERPL-SCNC: 141 MMOL/L (ref 133–144)

## 2019-09-28 PROCEDURE — 36415 COLL VENOUS BLD VENIPUNCTURE: CPT | Performed by: INTERNAL MEDICINE

## 2019-09-28 PROCEDURE — 99207 ZZC CONSULT E&M CHANGED TO SUBSEQUENT LEVEL: CPT | Performed by: INTERNAL MEDICINE

## 2019-09-28 PROCEDURE — 82010 KETONE BODYS QUAN: CPT | Performed by: OBSTETRICS & GYNECOLOGY

## 2019-09-28 PROCEDURE — 72200001 ZZH LABOR CARE VAGINAL DELIVERY SINGLE

## 2019-09-28 PROCEDURE — 83036 HEMOGLOBIN GLYCOSYLATED A1C: CPT | Performed by: INTERNAL MEDICINE

## 2019-09-28 PROCEDURE — 80048 BASIC METABOLIC PNL TOTAL CA: CPT | Performed by: INTERNAL MEDICINE

## 2019-09-28 PROCEDURE — 00000146 ZZHCL STATISTIC GLUCOSE BY METER IP

## 2019-09-28 PROCEDURE — 0KQM0ZZ REPAIR PERINEUM MUSCLE, OPEN APPROACH: ICD-10-PCS | Performed by: OBSTETRICS & GYNECOLOGY

## 2019-09-28 PROCEDURE — 99232 SBSQ HOSP IP/OBS MODERATE 35: CPT | Performed by: INTERNAL MEDICINE

## 2019-09-28 PROCEDURE — 36415 COLL VENOUS BLD VENIPUNCTURE: CPT | Performed by: OBSTETRICS & GYNECOLOGY

## 2019-09-28 PROCEDURE — 25000132 ZZH RX MED GY IP 250 OP 250 PS 637: Performed by: OBSTETRICS & GYNECOLOGY

## 2019-09-28 PROCEDURE — 12000000 ZZH R&B MED SURG/OB

## 2019-09-28 RX ORDER — OXYTOCIN/0.9 % SODIUM CHLORIDE 30/500 ML
340 PLASTIC BAG, INJECTION (ML) INTRAVENOUS CONTINUOUS PRN
Status: DISCONTINUED | OUTPATIENT
Start: 2019-09-28 | End: 2019-09-30 | Stop reason: HOSPADM

## 2019-09-28 RX ORDER — AMOXICILLIN 250 MG
1 CAPSULE ORAL 2 TIMES DAILY
Status: DISCONTINUED | OUTPATIENT
Start: 2019-09-28 | End: 2019-09-30 | Stop reason: HOSPADM

## 2019-09-28 RX ORDER — OXYCODONE HYDROCHLORIDE 5 MG/1
5 TABLET ORAL EVERY 4 HOURS PRN
Status: DISCONTINUED | OUTPATIENT
Start: 2019-09-28 | End: 2019-09-30 | Stop reason: HOSPADM

## 2019-09-28 RX ORDER — LANOLIN 100 %
OINTMENT (GRAM) TOPICAL
Status: DISCONTINUED | OUTPATIENT
Start: 2019-09-28 | End: 2019-09-30 | Stop reason: HOSPADM

## 2019-09-28 RX ORDER — HYDROCORTISONE 2.5 %
CREAM (GRAM) TOPICAL 3 TIMES DAILY PRN
Status: DISCONTINUED | OUTPATIENT
Start: 2019-09-28 | End: 2019-09-30 | Stop reason: HOSPADM

## 2019-09-28 RX ORDER — AMOXICILLIN 250 MG
2 CAPSULE ORAL 2 TIMES DAILY
Status: DISCONTINUED | OUTPATIENT
Start: 2019-09-28 | End: 2019-09-30 | Stop reason: HOSPADM

## 2019-09-28 RX ORDER — OXYTOCIN 10 [USP'U]/ML
10 INJECTION, SOLUTION INTRAMUSCULAR; INTRAVENOUS
Status: DISCONTINUED | OUTPATIENT
Start: 2019-09-28 | End: 2019-09-30 | Stop reason: HOSPADM

## 2019-09-28 RX ORDER — BISACODYL 10 MG
10 SUPPOSITORY, RECTAL RECTAL DAILY PRN
Status: DISCONTINUED | OUTPATIENT
Start: 2019-09-30 | End: 2019-09-30 | Stop reason: HOSPADM

## 2019-09-28 RX ORDER — OXYTOCIN/0.9 % SODIUM CHLORIDE 30/500 ML
100 PLASTIC BAG, INJECTION (ML) INTRAVENOUS CONTINUOUS
Status: DISCONTINUED | OUTPATIENT
Start: 2019-09-28 | End: 2019-09-30 | Stop reason: HOSPADM

## 2019-09-28 RX ORDER — DEXTROSE MONOHYDRATE 25 G/50ML
25-50 INJECTION, SOLUTION INTRAVENOUS
Status: DISCONTINUED | OUTPATIENT
Start: 2019-09-28 | End: 2019-09-28

## 2019-09-28 RX ORDER — NICOTINE POLACRILEX 4 MG
15-30 LOZENGE BUCCAL
Status: DISCONTINUED | OUTPATIENT
Start: 2019-09-28 | End: 2019-09-30 | Stop reason: HOSPADM

## 2019-09-28 RX ORDER — NICOTINE POLACRILEX 4 MG
15-30 LOZENGE BUCCAL
Status: DISCONTINUED | OUTPATIENT
Start: 2019-09-28 | End: 2019-09-28

## 2019-09-28 RX ORDER — DEXTROSE MONOHYDRATE 25 G/50ML
25-50 INJECTION, SOLUTION INTRAVENOUS
Status: DISCONTINUED | OUTPATIENT
Start: 2019-09-28 | End: 2019-09-30 | Stop reason: HOSPADM

## 2019-09-28 RX ORDER — NALOXONE HYDROCHLORIDE 0.4 MG/ML
.1-.4 INJECTION, SOLUTION INTRAMUSCULAR; INTRAVENOUS; SUBCUTANEOUS
Status: DISCONTINUED | OUTPATIENT
Start: 2019-09-28 | End: 2019-09-30 | Stop reason: HOSPADM

## 2019-09-28 RX ORDER — ACETAMINOPHEN 325 MG/1
650 TABLET ORAL EVERY 4 HOURS PRN
Status: DISCONTINUED | OUTPATIENT
Start: 2019-09-28 | End: 2019-09-30 | Stop reason: HOSPADM

## 2019-09-28 RX ORDER — IBUPROFEN 800 MG/1
800 TABLET, FILM COATED ORAL EVERY 6 HOURS PRN
Status: DISCONTINUED | OUTPATIENT
Start: 2019-09-28 | End: 2019-09-30 | Stop reason: HOSPADM

## 2019-09-28 RX ADMIN — ACETAMINOPHEN 650 MG: 325 TABLET, FILM COATED ORAL at 16:35

## 2019-09-28 RX ADMIN — SENNOSIDES AND DOCUSATE SODIUM 1 TABLET: 8.6; 5 TABLET ORAL at 07:38

## 2019-09-28 RX ADMIN — ACETAMINOPHEN 650 MG: 325 TABLET, FILM COATED ORAL at 11:28

## 2019-09-28 RX ADMIN — IBUPROFEN 800 MG: 800 TABLET ORAL at 19:05

## 2019-09-28 RX ADMIN — IBUPROFEN 800 MG: 800 TABLET ORAL at 07:38

## 2019-09-28 RX ADMIN — ACETAMINOPHEN 650 MG: 325 TABLET, FILM COATED ORAL at 21:35

## 2019-09-28 RX ADMIN — IBUPROFEN 800 MG: 800 TABLET ORAL at 13:16

## 2019-09-28 RX ADMIN — SENNOSIDES AND DOCUSATE SODIUM 1 TABLET: 8.6; 5 TABLET ORAL at 19:05

## 2019-09-28 RX ADMIN — IBUPROFEN 800 MG: 800 TABLET ORAL at 02:03

## 2019-09-28 NOTE — PLAN OF CARE
Patients mobililty level scored using the bedside mobility assistance tool (BMAT). Patient is at a mobility level test number: 4. Mobility equipment used: none required. Required assist of 0 staff members. Further use of BMAT scoring not required.

## 2019-09-28 NOTE — PLAN OF CARE
Infant sleepy at breast, only drops noted with hand expression, has not fed today; discussed donor milk.  Patient agreed and writer gave infant 10 mls of donor milk via syringe, tube and finger feeding.  Started patient pumping. Any EBM will be given to infant.

## 2019-09-28 NOTE — PROGRESS NOTES
"Day of Delivery - doing well.  No concerns.  VSS.  Glucose 109 this am.  Continue current care.    /78   Pulse 76   Temp 97.9  F (36.6  C) (Oral)   Resp 18   Ht 1.651 m (5' 5\")   Wt 108.9 kg (240 lb)   LMP 12/25/2018   Breastfeeding? Unknown   BMI 39.94 kg/m      Romelia Heath MD  Ob/Gyn  [facility]  [phone]    "

## 2019-09-28 NOTE — PLAN OF CARE
Patient meeting expected goals. Is voiding without difficulty. VSS. Pain is being managed with Ibuprofen and using ice packs, will offer Tucks, for perineum discomfort.  Attempts to BF have been made but infant is very spitty. Encouraged having infant upright and doing skin to skin.  Will try hand expression and finger feed when infant more interested.

## 2019-09-28 NOTE — CONSULTS
Consult Date:  09/28/2019      MEDICINE CONSULTATION      REQUESTING PROVIDER:  Mayo Manning MD, Obstetrics Service.      REASON FOR CONSULTATION:  Medical evaluation for gestational diabetes in postpartum patient.      HISTORY OF PRESENT ILLNESS:  Andressa Lazar is a 38-year-old female with past medical history significant for Lyme disease and abnormal Pap smear, who is postpartum.  The patient had gestational diabetes and was on NPH 76 units at bedtime and also on regular insulin.  While in the hospital, her glucose was monitored and it is fairly controlled between .  The patient denied any history of diabetes.  No history of high blood pressure or cholesterol.  Medical consultation was done to manage her gestational diabetes.      PAST MEDICAL HISTORY:   1.  Gestational diabetes.   2.  History of Lyme disease.      PAST SURGICAL HISTORY:  None.      FAMILY HISTORY:  Reviewed and noncontributory to her current medical condition.      SOCIAL HISTORY:  She is a former smoker.  She does not drink alcohol.  She does not use illicit drugs.      ALLERGIES:  NO KNOWN DRUG ALLERGIES.      REVIEW OF SYSTEMS:  Ten points reviewed, all are negative except those mentioned in history of present illness.      PHYSICAL EXAMINATION:   GENERAL:  The patient is awake, alert, oriented, not in distress.   VITAL SIGNS:  Blood pressure 127/78, pulse rate 76, temperature 97.9, oxygen saturation 98% on room air.   HEENT:  Pink, anicteric.  Extraocular muscle movement intact.   NECK:  Supple, no JVD, no thyromegaly.   CHEST:  Good air entry bilaterally.   ABDOMEN:  Obese, soft, nontender.   EXTREMITIES:  Trace bilateral lower extremity edema.   PSYCHIATRIC:  Normal mood and affect, keeps eye contact, responds to question appropriately.      DIAGNOSTIC TESTS OF INTEREST:  Electrolytes are essentially normal except chloride 110, creatinine 0.7.  Hemoglobin A1c 5.2, glucose 95.      ASSESSMENT:  Andressa Lazar is a 38-year-old female who  is postpartum with history of gestational diabetes and in the hospital,  currently being monitored postpartum.   1.  Postpartum.   2.  Gestational diabetes.      PLAN: Patient's glucose is controlled at this point, I would not start her on NPH, ordered sliding scale insulin.  I expect her glucose to remain stable.  If her glucose is elevated above 200, we will consider adding intermediate-acting insulin.  For now, we will continue with sliding scale insulin only.  I discussed with the patient and her family member at bedside.  The patient does not appear to have any questions.  I also discussed with the nursing staff.  We will follow-up from peripherally, please call if you have any question.  If patient glucose remains stable will sign her off.     Thank you for the consultation.         RADHA WILBURN MD             D: 2019   T: 2019   MT: SARAH      Name:     MARJ MANRIQUEZ   MRN:      [id number]        Account:       [id number]   :      1980           Consult Date:  2019      Document: [id number]       cc: Mayo Manning MD

## 2019-09-28 NOTE — L&D DELIVERY NOTE
OB Vaginal Delivery Note    Andressa Lazar MRN# [id number]   Age: 38 year old YOB: 1980       GA: 40w1d  GP:   Labor Complications:     EBL:    mL  Delivery QBL: 221 mL  Delivery Type: Vaginal, Spontaneous   ROM to Delivery Time: (Delivered) Hours: 11 Minutes: 54   Weight:      1 Minute 5 Minute 10 Minute   Apgar Totals: 8    9          GEETA NORTH;ANGELITA KEVIN MARYCRUZ     Delivery Details:  Andressa Lazar, a 38 year old  female delivered a viable infant with apgars of 8   and 9  . Patient was fully dilated and pushing after    hours    minutes in active labor. Delivery was via vaginal, spontaneous  to a sterile field under epidural  anesthesia. Infant delivered in vertex  left  occiput  anterior  position. Anterior and posterior shoulders delivered without difficulty. The cord was clamped, cut twice and 3 vessels  were noted. Cord blood was obtained in routine fashion with the following disposition: lab .      Cord complications: none   Placenta delivered at 2019  1:03 AM . Placental disposition was Hospital disposal . Fundal massage performed and fundus found to be firm.     Episiotomy: none    Perineum, vagina, cervix were inspected, and the following lacerations were noted:   Perineal lacerations: 2nd                     Any lacerations were repaired in the usual fashion using vicryl 3-0.    Excellent hemostasis was noted. Needle count correct. Infant and patient in delivery room in good and stable condition.        Labor Event Times    Dilation complete date:  19 Complete time:  11:27 PM   Start pushing date/time:  2019 0049      Labor Events     labor?:  No   steroids:  None  Labor Type:  Induction, AROM  Predominate monitoring during 1st stage:  continuous electronic fetal monitoring     Rupture date/time: 19 1304   Rupture type:  Artificial Rupture of Membranes  Fluid color:  Clear  Fluid odor:  Normal     Induction:  Misoprostol,  Cervidil, AROM, Mechanical ripening agent  Induction date/time:     Cervical ripening date/time:        1:1 continuous labor support provided by?:  RN       Delivery/Placenta Date and Time    Delivery Date:  9/28/19 Delivery Time:  12:58 AM   Placenta Date/Time:  9/28/2019  1:03 AM  Oxytocin given at the time of delivery:  after delivery of placenta     Vaginal Counts     Initial count performed by 2 team members:   Two Team Members   Dr Nigel Connolly RN       [place] [employer]   Initial counts 2  5    Added to count  1     Final counts       Placed during labor Accounted for at the end of labor   Yes    No    Yes Yes    Final count performed by 2 team members:   Two Team Members   Dr Nigel Connolly RN              Apgars    Living status:  Living   1 Minute 5 Minute 10 Minute 15 Minute 20 Minute   Skin color: 0  1       Heart rate: 2  2       Reflex irritability: 2  2       Muscle tone: 2  2       Respiratory effort: 2  2       Total: 8  9       Apgars assigned by:  ALFONSO GROVER RN     Cord    Vessels:  3 Vessels Complications:  None   Cord Blood Disposition:  Lab Gases Sent?:  No      Skin to Skin and Feeding Plan    Skin to skin initiation date/time: 1/9/1841    Skin to skin with:  Mother  Skin to skin end date/time:        Delivery (Maternal) (Provider to Complete) ([id number])    Episiotomy:  None  Perineal lacerations:  2nd Repaired?:  Yes   Number of repair packets:  1     Blood Loss  Mother: Andressa Lazar #[id number]   Start of Mother's Information    IO Blood Loss  09/27/19 1258 - 09/28/19 0200    Delivery QBL (mL) Hospital Encounter 221 mL    Total  221 mL         End of Mother's Information  Mother: Andressa Lazar #[id number]         Delivery - Provider to Complete ([id number])    Delivering clinician:  Mayo Manning MD  Attempted Delivery Types (Choose all that apply):  Spontaneous Vaginal Delivery  Delivery Type (Choose the 1 that will go to the Birth History):  Vaginal,  Spontaneous   Other personnel:   Provider Role   Mayo Manning MD Obstetrician   Ly Connolly, RN          Placenta    Delayed Cord Clamping:  Done  Date/Time:  9/28/2019  1:03 AM  Removal:  Spontaneous  Disposition:  Hospital disposal     Anesthesia    Method:  Epidural          Presentation and Position    Presentation:  Vertex  Position:  Left Occiput Anterior           Mayo Manning MD

## 2019-09-28 NOTE — ANESTHESIA POSTPROCEDURE EVALUATION
Patient: Andressa Lazar    * No procedures listed *    Diagnosis:* No pre-op diagnosis entered *  Diagnosis Additional Information: IUP, in labor    Anesthesia Type:  Epidural    Note:  Anesthesia Post Evaluation         Comments:     S/P epidural for labor.   I or my partner was immediately available for management of this patient during epidural analgesia infusion.  VSS.  Doing well. Block resolved.  Neuro at baseline. Denies positional headache. Minimal side effects easily managed w/ PRN meds. No apparent anesthetic complications. No follow-up required.    Raj Simmons MD        Last vitals:  Vitals:    09/28/19 0252 09/28/19 0307 09/28/19 0728   BP: 112/63 110/63 127/78   Resp:   18   Temp:   97.9  F (36.6  C)         Electronically Signed By: Raj Simmons MD  September 28, 2019  7:31 AM

## 2019-09-28 NOTE — PROVIDER NOTIFICATION
09/27/19 2201   Provider Notification   Provider Name/Title Dr Manning   Method of Notification Phone   Request Evaluate - Remote   Notification Reason SVE;Status Update   pt lip/100/-1, patient starting to feel presure with contractions. Pitocin running at 6 lyle-units. Orders to place patient in upright position. Will recheck SVE in 1 hour. FHTs show VD. Will continue to monitor and update as needed.

## 2019-09-28 NOTE — PROVIDER NOTIFICATION
09/27/19 1205   Provider Notification   Provider Name/Title Dr Manning   Method of Notification Phone   Request Attend Delivery   Dr Manning notified to attend delivery due to significant fetal decent.

## 2019-09-28 NOTE — PLAN OF CARE
Data: Andressa Lazar transferred to [facility] via wheelchair at 0345 . Baby transferred via parent's arms.  Action: Receiving unit notified of transfer: Yes. Patient and family notified of room change. Will assume cares for pt. Belongings sent to receiving unit. Accompanied by Registered Nurse. Oriented patient to surroundings. Call light within reach. ID bands double-checked with Kayleigh THOMPSON.  Response: Patient tolerated transfer and is stable.

## 2019-09-28 NOTE — PROVIDER NOTIFICATION
09/27/19 3712   Provider Notification   Provider Name/Title Dr Manning   Method of Notification Phone   Request Evaluate - Remote   Notification Reason SVE;Status Update;Labor Status   Dr Manning updated on SVE 10/100/+1. Baby Cat 2, recurrent variables with moderate variability. Pitocin at 6, insulin drip still going and BGs have been stable. MD advised to begin pushing and see if pt effectively moves baby down. Will call and update MD on status of pushing and if change is made MD stated she will be there in 15 min for delivery.

## 2019-09-29 LAB
GLUCOSE BLDC GLUCOMTR-MCNC: 102 MG/DL (ref 70–99)
GLUCOSE BLDC GLUCOMTR-MCNC: 102 MG/DL (ref 70–99)
GLUCOSE BLDC GLUCOMTR-MCNC: 150 MG/DL (ref 70–99)
GLUCOSE BLDC GLUCOMTR-MCNC: 91 MG/DL (ref 70–99)
HGB BLD-MCNC: 11.2 G/DL (ref 11.7–15.7)

## 2019-09-29 PROCEDURE — 25000132 ZZH RX MED GY IP 250 OP 250 PS 637: Performed by: OBSTETRICS & GYNECOLOGY

## 2019-09-29 PROCEDURE — 40000086 ZZH STATISTIC IP LACTATION SERVICES 46-60 MIN

## 2019-09-29 PROCEDURE — 85018 HEMOGLOBIN: CPT | Performed by: OBSTETRICS & GYNECOLOGY

## 2019-09-29 PROCEDURE — 12000000 ZZH R&B MED SURG/OB

## 2019-09-29 PROCEDURE — 36415 COLL VENOUS BLD VENIPUNCTURE: CPT | Performed by: OBSTETRICS & GYNECOLOGY

## 2019-09-29 PROCEDURE — 00000146 ZZHCL STATISTIC GLUCOSE BY METER IP

## 2019-09-29 RX ADMIN — ACETAMINOPHEN 650 MG: 325 TABLET, FILM COATED ORAL at 19:02

## 2019-09-29 RX ADMIN — ACETAMINOPHEN 650 MG: 325 TABLET, FILM COATED ORAL at 01:03

## 2019-09-29 RX ADMIN — IBUPROFEN 800 MG: 800 TABLET ORAL at 14:01

## 2019-09-29 RX ADMIN — SENNOSIDES AND DOCUSATE SODIUM 2 TABLET: 8.6; 5 TABLET ORAL at 09:23

## 2019-09-29 RX ADMIN — ACETAMINOPHEN 650 MG: 325 TABLET, FILM COATED ORAL at 23:00

## 2019-09-29 RX ADMIN — ACETAMINOPHEN 650 MG: 325 TABLET, FILM COATED ORAL at 14:01

## 2019-09-29 RX ADMIN — IBUPROFEN 800 MG: 800 TABLET ORAL at 21:23

## 2019-09-29 RX ADMIN — ACETAMINOPHEN 650 MG: 325 TABLET, FILM COATED ORAL at 04:50

## 2019-09-29 RX ADMIN — IBUPROFEN 800 MG: 800 TABLET ORAL at 01:03

## 2019-09-29 RX ADMIN — ACETAMINOPHEN 650 MG: 325 TABLET, FILM COATED ORAL at 09:24

## 2019-09-29 RX ADMIN — IBUPROFEN 800 MG: 800 TABLET ORAL at 06:44

## 2019-09-29 NOTE — PROGRESS NOTES
[facility]    Post-Partum Progress Note    Assessment & Plan   Assessment:  Post-partum day #1  Normal spontaneous vaginal delivery    Doing well.  Normal healing wound.  No excessive bleeding  Pain well-controlled.    Plan:  Ambulation encouraged  Lactation consultation  Anticipate discharge tomorrow    Mayo Manning     Interval History   Doing well.  Pain is adequately controlled.  No fevers.  No history of foul-smelling vaginal discharge.  Good appetite.  Denies chest pain, shortness of breath, nausea or vomiting.  Vaginal bleeding is similar to a heavy menstrual flow.  Breastfeeding well.    Medications     dextrose 5% lactated ringers 125 mL/hr at 09/27/19 1839     - MEDICATION INSTRUCTIONS -       - MEDICATION INSTRUCTIONS -       NO Rho (D) immune globulin (RhoGam) needed - mother Rh POSITIVE       - MEDICATION INSTRUCTIONS -       - MEDICATION INSTRUCTIONS -       oxytocin in 0.9% NaCl       oxytocin in 0.9% NaCl         influenza vaccine adult (product based on age)  0.5 mL Intramuscular Prior to discharge     insulin aspart  1-7 Units Subcutaneous TID AC     insulin aspart  1-5 Units Subcutaneous At Bedtime     senna-docusate  1 tablet Oral BID    Or     senna-docusate  2 tablet Oral BID       Physical Exam   Temp: 98.1  F (36.7  C) Temp src: Oral BP: 122/84 Pulse: 90 Heart Rate: 76 Resp: 18        Vitals:    09/25/19 0843   Weight: 108.9 kg (240 lb)     Vital Signs with Ranges  Temp:  [98  F (36.7  C)-98.1  F (36.7  C)] 98.1  F (36.7  C)  Pulse:  [75-90] 90  Heart Rate:  [76] 76  Resp:  [18] 18  BP: (122-128)/(81-89) 122/84  No intake/output data recorded.    Uterine fundus is firm, non-tender and at the level of the umbilicus  Extremities Non-tender  Perineal sutures intact and wound healing well  Lochia healthy    Data   Recent Labs   Lab Test 09/25/19  0900   ABO A   RH Pos     Recent Labs   Lab Test 09/29/19  0644   HGB 11.2*     Recent Labs   Lab Test 03/18/19   RUQIGG Immune

## 2019-09-29 NOTE — PLAN OF CARE
Data: Vital signs within normal limits. Postpartum checks within normal limits - see flow record. Patient eating and drinking normally. Up to bathroom independently and voiding adequate amounts. Perineum has second degree laceration with minimal swelling and no signs of infection, using ice packs for comfort. Patient performing self cares and is able to care for infant.      Action: Patient medicated during the shift for pain. See MAR. Patient reassessed within 1 hour after each medication and pain was improved - patient stated she was comfortable. Patient education done about breastfeeding, pain management, and normal postpartum findings. See flow record.    Response: Positive attachment behaviors observed with infant. Father of baby present and supportive.    Plan: Continue discharge planning.

## 2019-09-29 NOTE — PLAN OF CARE
Vss afebrile. Ff/1. Voiding with problems. Pt taking ibuprofen/tylenol for pain control. Pt ambulating in hallways. Breast feeding with shield, using donor milk and pumping. Pt attentive to baby's needs. Will check OT with dinner.

## 2019-09-29 NOTE — PROGRESS NOTES
Chart check.  Glucose remains stable, mostly less than 100.  Currently patient is on sliding scale insulin  Glucose remains stable, consider stopping glucose check and a sliding scale insulin tomorrow.  We will sign off, please call hospitalist for any question.

## 2019-09-29 NOTE — PLAN OF CARE
VSS, , patient did not call when eating around lunch time for BG check, has family coming in with food later this afternoon and patient will call for BG check. Message from Hospitalist to call him if any concerns re BG readings. Oral analgesia given , patient up independent in room, has showered. Stable at this time.

## 2019-09-29 NOTE — PLAN OF CARE
Stable this shift. Motrin and tylenol for discomfort. Pumping for baby. No insulin needed this shift. Working on breastfeeding.

## 2019-09-30 VITALS
SYSTOLIC BLOOD PRESSURE: 124 MMHG | BODY MASS INDEX: 39.99 KG/M2 | TEMPERATURE: 98.6 F | WEIGHT: 240 LBS | HEART RATE: 82 BPM | DIASTOLIC BLOOD PRESSURE: 76 MMHG | RESPIRATION RATE: 18 BRPM | HEIGHT: 65 IN

## 2019-09-30 LAB
GLUCOSE BLDC GLUCOMTR-MCNC: 86 MG/DL (ref 70–99)
GLUCOSE BLDC GLUCOMTR-MCNC: 95 MG/DL (ref 70–99)

## 2019-09-30 PROCEDURE — 25000132 ZZH RX MED GY IP 250 OP 250 PS 637: Performed by: OBSTETRICS & GYNECOLOGY

## 2019-09-30 PROCEDURE — 00000146 ZZHCL STATISTIC GLUCOSE BY METER IP

## 2019-09-30 RX ADMIN — IBUPROFEN 800 MG: 800 TABLET ORAL at 03:21

## 2019-09-30 RX ADMIN — ACETAMINOPHEN 650 MG: 325 TABLET, FILM COATED ORAL at 13:01

## 2019-09-30 RX ADMIN — IBUPROFEN 800 MG: 800 TABLET ORAL at 13:01

## 2019-09-30 RX ADMIN — ACETAMINOPHEN 650 MG: 325 TABLET, FILM COATED ORAL at 09:07

## 2019-09-30 RX ADMIN — ACETAMINOPHEN 650 MG: 325 TABLET, FILM COATED ORAL at 03:21

## 2019-09-30 NOTE — PROGRESS NOTES
[facility] Obstetrics Post-Partum Progress Note          Assessment and Plan:    Assessment:   Post-partum day #2  Normal spontaneous vaginal delivery  L&D complications: None      Doing well.  No excessive bleeding  Pain well-controlled.      Plan:   Discharge later today           Interval History:   Doing well.  Pain is adequately controlled.  No fevers.  No history of foul-smelling vaginal discharge.  Good appetite.  Denies chest pain, shortness of breath, nausea or vomiting.  Vaginal bleeding is similar to a heavy menstrual flow.  Breastfeeding well.          Significant Problems:    None          Review of Systems:    The patient denies any chest pain, shortness of breath, excessive pain, fever, chills, purulent drainage from the wound, nausea or vomiting.          Medications:   All medications have been reviewed          Physical Exam:   All vitals stable  Uterine fundus is firm, non-tender and at the level of the umbilicus          Data:   All laboratory data reviewed      Suleman Ballesteros

## 2019-09-30 NOTE — PLAN OF CARE
Data: Vital signs within normal limits. Postpartum checks within normal limits - see flow record. Patient eating and drinking normally. Patient able to empty bladder independently and is up ambulating. No apparent signs of infection. perineum healing well. Patient performing self cares and is able to care for infant.  Action: Patient medicated during the shift for perineum. See MAR. Patient reassessed within 1 hour after each medication and pain was improved - patient stated she was comfortable. Patient education done about self, infant cares and discharge teaching. See flow record.  Response: Positive attachment behaviors observed with infant. Support persons  present.   Plan: Anticipate discharge on today.

## 2019-09-30 NOTE — LACTATION NOTE
Lactation assisted with feeds all shift. Baby latching to breast with shield. Very frustrated, and fussy. Tube placed under shield with doner milk, and baby nursing well. After volume done baby continues to nurse. Colostrum seen in shield. Breasts starting to fill. Encouraged pumping. Attempted to get baby on without shield, baby pushes back from breast. Encouraged to continue using shield, and van attempt without, if baby fussy place shield. Breast compressions done, educated on doing so. Will call for assistance with feed.

## 2019-09-30 NOTE — PLAN OF CARE
Discharge instructions reviewed with patient. Patient verbalized understanding. Patient discharged to home per wheel chair with baby at 1420.

## 2019-09-30 NOTE — LACTATION NOTE
This note was copied from a baby's chart.  LC visit and discussion about plan for discharge.  She plans to continue to nurse, supplement via 5FR and finger feeding, and secure donor milk following discharge.  She has not been able to pump measurable volumes of milk and is using donor milk for baby due to concerns about blood sugar stability after birth.  She is also using the nipple shield, so LC will follow up after discharge.  All questions were answered. Plan reviewed.

## 2019-09-30 NOTE — DISCHARGE SUMMARY
[facility] Discharge Summary    Andressa Lazar MRN# [id number]   Age: 38 year old YOB: 1980     Date of Admission:  2019  Date of Discharge::  2019  Admitting Physician:  Mayo Manning MD  Discharge Physician:  Suleman Ballesteros     [place] clinic: [facility]          Admission Diagnoses:   Gestational diabetes mellitus (GDM) in third trimester   (normal spontaneous vaginal delivery)          Discharge Diagnosis:   Normal spontaneous vaginal delivery  Intrauterine pregnancy at 40 weeks gestation          Procedures:   Procedure(s): No additional procedures performed                  Medications Prior to Admission:     Medications Prior to Admission   Medication Sig Dispense Refill Last Dose     albuterol (PROAIR HFA/PROVENTIL HFA/VENTOLIN HFA) 108 (90 Base) MCG/ACT inhaler Inhale 1-2 puffs into the lungs        fluticasone (FLONASE) 50 MCG/ACT nasal spray 1-2 sprays        insulin NPH (HUMULIN N/NOVOLIN N VIAL) 100 UNIT/ML vial Inject 76 Units Subcutaneous At Bedtime         insulin regular (HUMULIN R/NOVOLIN R VIAL) 100 UNIT/ML vial Inject 4-8 Units Subcutaneous 4-6 units before breakfast  8 units before supper        PRENATAL 27-1 MG TABS Take 1 tablet by mouth                Discharge Medications:   No medications were prescribed on discharge          Consultations:   No consultations were requested during this admission          Brief History of Labor:   Andressa Lazar, a 38 year old  female delivered a viable infant with apgars of 8   and 9  . Patient was fully dilated and pushing after    hours    minutes in active labor. Delivery was via vaginal, spontaneous  to a sterile field under epidural  anesthesia. Infant delivered in vertex  left  occiput  anterior  position. Anterior and posterior shoulders delivered without difficulty. The cord was clamped, cut twice and 3 vessels  were noted. Cord blood was obtained in routine fashion with the following disposition: lab .        Cord complications: none   Placenta delivered at 9/28/2019  1:03 AM . Placental disposition was Hospital disposal . Fundal massage performed and fundus found to be firm.      Episiotomy: none    Perineum, vagina, cervix were inspected, and the following lacerations were noted:   Perineal lacerations: 2nd                      Any lacerations were repaired in the usual fashion using vicryl 3-0.     Excellent hemostasis was noted. Needle count correct. Infant and patient in delivery room in good and stable condition.            Hospital Course:   The patient's hospital course was unremarkable.  On discharge, her pain was well controlled. Vaginal bleeding is similar to peak menstrual flow.  Voiding without difficulty.  Ambulating well and tolerating a normal diet.  No fever.  Breastfeeding well.  Infant is stable.    She was discharged on post-partum day #2.    Post-partum hemoglobin:   Hemoglobin   Date Value Ref Range Status   09/29/2019 11.2 (L) 11.7 - 15.7 g/dL Final             Discharge Instructions and Follow-Up:   Discharge diet: Regular   Discharge activity: Activity as tolerated   Discharge follow-up: Follow up with primary care provider in 6 weeks   Wound care:            Discharge Disposition:   Discharged to home      Attestation:  I have reviewed today's vital signs, notes, medications, labs and imaging.    Suleman Ballesteros

## 2019-09-30 NOTE — PLAN OF CARE
Patient vital signs stable and meeting expected outcomes.  Breastfeeding well with some assistance from staff in helping with SNS feeding.  Up independently and voiding adequately.  Pain well controlled with tylenol, ibuprofen, and ice packs to perineum.  Bedtime blood sugar was 102.  Able to perform all cares for self and infant.  Bonding well with baby.  FOB present and supportive at bedside.  Plan to discharge home today.  Will continue to monitor.

## 2019-09-30 NOTE — DISCHARGE INSTRUCTIONS
Postpartum Vaginal Delivery Instructions    Lactation [phone]    [facility] [phone]    Activity       Ask family and friends for help when you need it.    Do not place anything in your vagina for 6 weeks.    You are not restricted on other activities, but take it easy for a few weeks to allow your body to recover from delivery.  You are able to do any activities you feel up to that point.    No driving until you have stopped taking your pain medications (usually two weeks after delivery).     Call your health care provider if you have any of these symptoms:       Increased pain, swelling, redness, or fluid around your stiches from an episiotomy or perineal tear.    A fever above 100.4 F (38 C) with or without chills when placing a thermometer under your tongue.    You soak a sanitary pad with blood within 1 hour, or you see blood clots larger than a golf ball.    Bleeding that lasts more than 6 weeks.    Vaginal discharge that smells bad.    Severe pain, cramping or tenderness in your lower belly area.    A need to urinate more frequently (use the toilet more often), more urgently (use the toilet very quickly), or it burns when you urinate.    Nausea and vomiting.    Redness, swelling or pain around a vein in your leg.    Problems breastfeeding or a red or painful area on your breast.    Chest pain and cough or are gasping for air.    Problems coping with sadness, anxiety, or depression.  If you have any concerns about hurting yourself or the baby, call your provider immediately.     You have questions or concerns after you return home.     Keep your hands clean:  Always wash your hands before touching your perineal area and stitches.  This helps reduce your risk of infection.  If your hands aren't dirty, you may use an alcohol hand-rub to clean your hands. Keep your nails clean and short.        Make appointment to be seen in 6 weeks.

## 2019-10-08 ENCOUNTER — TELEPHONE (OUTPATIENT)
Dept: OBGYN | Facility: CLINIC | Age: 39
End: 2019-10-08

## 2019-12-09 ENCOUNTER — HEALTH MAINTENANCE LETTER (OUTPATIENT)
Age: 39
End: 2019-12-09

## 2020-03-15 ENCOUNTER — HEALTH MAINTENANCE LETTER (OUTPATIENT)
Age: 40
End: 2020-03-15

## 2020-12-27 ENCOUNTER — APPOINTMENT (OUTPATIENT)
Dept: CT IMAGING | Facility: CLINIC | Age: 40
End: 2020-12-27
Attending: EMERGENCY MEDICINE
Payer: COMMERCIAL

## 2020-12-27 ENCOUNTER — HOSPITAL ENCOUNTER (EMERGENCY)
Facility: CLINIC | Age: 40
Discharge: HOME | End: 2020-12-27
Attending: EMERGENCY MEDICINE | Admitting: EMERGENCY MEDICINE
Payer: COMMERCIAL

## 2020-12-27 VITALS
DIASTOLIC BLOOD PRESSURE: 69 MMHG | WEIGHT: 191.8 LBS | TEMPERATURE: 96.8 F | BODY MASS INDEX: 31.92 KG/M2 | RESPIRATION RATE: 16 BRPM | HEART RATE: 61 BPM | OXYGEN SATURATION: 97 % | SYSTOLIC BLOOD PRESSURE: 114 MMHG

## 2020-12-27 DIAGNOSIS — K57.92 DIVERTICULITIS: ICD-10-CM

## 2020-12-27 DIAGNOSIS — N83.201 BILATERAL OVARIAN CYSTS: ICD-10-CM

## 2020-12-27 DIAGNOSIS — N83.202 BILATERAL OVARIAN CYSTS: ICD-10-CM

## 2020-12-27 LAB
ALBUMIN SERPL-MCNC: 3.8 G/DL (ref 3.4–5)
ALBUMIN UR-MCNC: NEGATIVE MG/DL
ALP SERPL-CCNC: 66 U/L (ref 40–150)
ALT SERPL W P-5'-P-CCNC: 29 U/L (ref 0–50)
ANION GAP SERPL CALCULATED.3IONS-SCNC: 4 MMOL/L (ref 3–14)
APPEARANCE UR: CLEAR
AST SERPL W P-5'-P-CCNC: 34 U/L (ref 0–45)
BACTERIA #/AREA URNS HPF: ABNORMAL /HPF
BASOPHILS # BLD AUTO: 0 10E9/L (ref 0–0.2)
BASOPHILS NFR BLD AUTO: 0.3 %
BILIRUB SERPL-MCNC: 0.4 MG/DL (ref 0.2–1.3)
BILIRUB UR QL STRIP: NEGATIVE
BUN SERPL-MCNC: 8 MG/DL (ref 7–30)
CALCIUM SERPL-MCNC: 8.6 MG/DL (ref 8.5–10.1)
CHLORIDE SERPL-SCNC: 109 MMOL/L (ref 94–109)
CO2 SERPL-SCNC: 25 MMOL/L (ref 20–32)
COLOR UR AUTO: ABNORMAL
CREAT SERPL-MCNC: 0.52 MG/DL (ref 0.52–1.04)
DIFFERENTIAL METHOD BLD: ABNORMAL
EOSINOPHIL # BLD AUTO: 0.1 10E9/L (ref 0–0.7)
EOSINOPHIL NFR BLD AUTO: 0.5 %
ERYTHROCYTE [DISTWIDTH] IN BLOOD BY AUTOMATED COUNT: 12.3 % (ref 10–15)
GFR SERPL CREATININE-BSD FRML MDRD: >90 ML/MIN/{1.73_M2}
GLUCOSE SERPL-MCNC: 84 MG/DL (ref 70–99)
GLUCOSE UR STRIP-MCNC: NEGATIVE MG/DL
HCG UR QL: NEGATIVE
HCT VFR BLD AUTO: 44.2 % (ref 35–47)
HGB BLD-MCNC: 15 G/DL (ref 11.7–15.7)
HGB UR QL STRIP: NEGATIVE
IMM GRANULOCYTES # BLD: 0 10E9/L (ref 0–0.4)
IMM GRANULOCYTES NFR BLD: 0.4 %
KETONES UR STRIP-MCNC: NEGATIVE MG/DL
LACTATE BLD-SCNC: 0.8 MMOL/L (ref 0.7–2)
LEUKOCYTE ESTERASE UR QL STRIP: NEGATIVE
LIPASE SERPL-CCNC: 96 U/L (ref 73–393)
LYMPHOCYTES # BLD AUTO: 2 10E9/L (ref 0.8–5.3)
LYMPHOCYTES NFR BLD AUTO: 17.7 %
MCH RBC QN AUTO: 30.5 PG (ref 26.5–33)
MCHC RBC AUTO-ENTMCNC: 33.9 G/DL (ref 31.5–36.5)
MCV RBC AUTO: 90 FL (ref 78–100)
MONOCYTES # BLD AUTO: 0.6 10E9/L (ref 0–1.3)
MONOCYTES NFR BLD AUTO: 5 %
MUCOUS THREADS #/AREA URNS LPF: PRESENT /LPF
NEUTROPHILS # BLD AUTO: 8.5 10E9/L (ref 1.6–8.3)
NEUTROPHILS NFR BLD AUTO: 76.1 %
NITRATE UR QL: NEGATIVE
NRBC # BLD AUTO: 0 10*3/UL
NRBC BLD AUTO-RTO: 0 /100
PH UR STRIP: 6.5 PH (ref 5–7)
PLATELET # BLD AUTO: 248 10E9/L (ref 150–450)
POTASSIUM SERPL-SCNC: 4.7 MMOL/L (ref 3.4–5.3)
PROT SERPL-MCNC: 7.4 G/DL (ref 6.8–8.8)
RBC # BLD AUTO: 4.91 10E12/L (ref 3.8–5.2)
RBC #/AREA URNS AUTO: <1 /HPF (ref 0–2)
SODIUM SERPL-SCNC: 138 MMOL/L (ref 133–144)
SOURCE: ABNORMAL
SP GR UR STRIP: 1 (ref 1–1.03)
SQUAMOUS #/AREA URNS AUTO: <1 /HPF (ref 0–1)
UROBILINOGEN UR STRIP-MCNC: NORMAL MG/DL (ref 0–2)
WBC # BLD AUTO: 11.2 10E9/L (ref 4–11)
WBC #/AREA URNS AUTO: 0 /HPF (ref 0–5)

## 2020-12-27 PROCEDURE — 83690 ASSAY OF LIPASE: CPT | Performed by: EMERGENCY MEDICINE

## 2020-12-27 PROCEDURE — 80053 COMPREHEN METABOLIC PANEL: CPT | Performed by: EMERGENCY MEDICINE

## 2020-12-27 PROCEDURE — 250N000011 HC RX IP 250 OP 636: Performed by: EMERGENCY MEDICINE

## 2020-12-27 PROCEDURE — 81001 URINALYSIS AUTO W/SCOPE: CPT | Performed by: EMERGENCY MEDICINE

## 2020-12-27 PROCEDURE — 83605 ASSAY OF LACTIC ACID: CPT | Performed by: EMERGENCY MEDICINE

## 2020-12-27 PROCEDURE — 85025 COMPLETE CBC W/AUTO DIFF WBC: CPT | Performed by: EMERGENCY MEDICINE

## 2020-12-27 PROCEDURE — 99285 EMERGENCY DEPT VISIT HI MDM: CPT | Mod: 25

## 2020-12-27 PROCEDURE — 81025 URINE PREGNANCY TEST: CPT | Performed by: EMERGENCY MEDICINE

## 2020-12-27 PROCEDURE — 74177 CT ABD & PELVIS W/CONTRAST: CPT

## 2020-12-27 PROCEDURE — 250N000013 HC RX MED GY IP 250 OP 250 PS 637: Performed by: EMERGENCY MEDICINE

## 2020-12-27 RX ORDER — ACETAMINOPHEN 500 MG
1000 TABLET ORAL ONCE
Status: COMPLETED | OUTPATIENT
Start: 2020-12-27 | End: 2020-12-27

## 2020-12-27 RX ORDER — OXYCODONE AND ACETAMINOPHEN 5; 325 MG/1; MG/1
1-2 TABLET ORAL EVERY 4 HOURS PRN
Qty: 12 TABLET | Refills: 0 | Status: SHIPPED | OUTPATIENT
Start: 2020-12-27 | End: 2020-12-27

## 2020-12-27 RX ORDER — OXYCODONE AND ACETAMINOPHEN 5; 325 MG/1; MG/1
1-2 TABLET ORAL EVERY 4 HOURS PRN
Qty: 12 TABLET | Refills: 0 | Status: SHIPPED | OUTPATIENT
Start: 2020-12-27 | End: 2024-07-30

## 2020-12-27 RX ORDER — IBUPROFEN 200 MG
400 TABLET ORAL ONCE
Status: COMPLETED | OUTPATIENT
Start: 2020-12-27 | End: 2020-12-27

## 2020-12-27 RX ORDER — IOPAMIDOL 755 MG/ML
100 INJECTION, SOLUTION INTRAVASCULAR ONCE
Status: COMPLETED | OUTPATIENT
Start: 2020-12-27 | End: 2020-12-27

## 2020-12-27 RX ADMIN — IBUPROFEN 400 MG: 200 TABLET, FILM COATED ORAL at 18:55

## 2020-12-27 RX ADMIN — IOPAMIDOL 100 ML: 755 INJECTION, SOLUTION INTRAVENOUS at 16:51

## 2020-12-27 RX ADMIN — ACETAMINOPHEN 1000 MG: 500 TABLET, FILM COATED ORAL at 18:55

## 2020-12-27 ASSESSMENT — ENCOUNTER SYMPTOMS
DYSURIA: 0
ABDOMINAL PAIN: 1
VOMITING: 0
DIARRHEA: 1
NAUSEA: 0
FEVER: 0
BLOOD IN STOOL: 0

## 2020-12-27 NOTE — ED TRIAGE NOTES
Pt having low abdominal pain.  Has been on abx treatment for diverticulitis since dec 14.  Pain is worse.

## 2020-12-27 NOTE — ED PROVIDER NOTES
History   Chief Complaint:  Abdominal Pain     The history is provided by the patient.      Andressa Lazar is a 40 year old female with history of diverticulitis, lyme disease, diabetes amongst others as noted below who presents alone for evaluation of dull low abdominal pain that began 4-5 days ago in the setting of being recently treated for suspected diverticulitis with Cipro and Flagyl on Dec 14. Patient began to experience some low abdominal pain reminiscent of prior diverticulitis on Dec 14, thus was started on antibiotics. She was feeling improved initially, but by the end of the course, she began to feel unwell again with dull low abdominal pain with intermittent diarrhea. Pain became more sharp in nature the last 1-2 days, prompting her to present to Urgent Care with the below work up. Pain persisted, prompting her presentation.     Here, patient notes no history of abdominal surgeries or history of small bowel obstruction. She has not had any trauma to the area or noted fever, chills, nausea, vomiting, hematochezia, dysuria or leg swelling.     Laboratory Testing from  Visit 12/26/20:  CBC: AWNL (WBC 7.2, HGB 15.3, )  Occult Blood: Negative    Review of Systems   Constitutional: Negative for fever.   Cardiovascular: Negative for leg swelling.   Gastrointestinal: Positive for abdominal pain and diarrhea. Negative for blood in stool, nausea and vomiting.   Genitourinary: Negative for dysuria.   All other systems reviewed and are negative.      Allergies:  The patient has no known allergies.     Medications:  Mirena  Albuterol inhaler    Past Medical History:    Diabetes  Asthma    Marijuana use  Multiple miscarriage  Lyme disease  Diverticulitis    Past Surgical History:    Rhinoplasty  Ulnar nerve repair - right arm    Ear tubes    Family History:    Father - cancer  Mother - breast cancer, migraines    Social History:  The patient was unaccompanied to the ED.  Patient just had a baby 1.5 years  ago.     Physical Exam     Patient Vitals for the past 24 hrs:   BP Temp Temp src Pulse Resp SpO2 Weight   12/27/20 1526 137/89 96.8  F (36  C) Temporal 85 16 98 % 87 kg (191 lb 12.8 oz)       Physical Exam   Constitutional: Well appearing.  HEENT: Atraumatic.  Moist mucous membranes.  Neck: Soft.  Supple.    Cardiac: Regular rate and rhythm.  No murmur or rub.  Respiratory: Clear to auscultation bilaterally.  No respiratory distress.    Abdomen: Soft. Tenderness to the left lower quadrant with palpation. No rebound or guarding. Nondistended.  Musculoskeletal: No edema.  Normal range of motion. Normal gait.  Skin: No rashes.  No edema.  Psych: Normal affect.  Normal behavior.    Emergency Department Course   Imaging:  CT Abdomen Pelvis w Contrast:  IMPRESSION:   1.  Diverticulitis at the junction of the descending and sigmoid colon, recommend follow-up to resolution. Eventual evaluation with colonoscopy is recommended to exclude underlying colorectal neoplasm, if not already performed.   2.  4 cm right and 1.5 cm left adnexal cyst. Recommend follow-up as below.   3.  IUD with retroverted uterus.   4.  Normal appendix.   5.  No obstructing renal or ureteral stones.   Reading per radiology.     Laboratory:  Labs Ordered and Resulted from Time of ED Arrival Up to the Time of Departure from the ED   CBC WITH PLATELETS DIFFERENTIAL - Abnormal; Notable for the following components:       Result Value    WBC 11.2 (*)     Absolute Neutrophil 8.5 (*)     All other components within normal limits   ROUTINE UA WITH MICROSCOPIC - Abnormal; Notable for the following components:    Bacteria Urine Few (*)     Mucous Urine Present (*)     All other components within normal limits   LACTIC ACID WHOLE BLOOD   HCG QUALITATIVE URINE   PERIPHERAL IV CATHETER       Procedures      Emergency Department Course:    Reviewed:  I reviewed nursing notes, vitals and past medical history    Assessments:  (1010)   I performed an exam as noted  above. Declined pain medications currently.     I rechecked the patient. Discussed results and plan of care.     Interventions:  Medications   iopamidol (ISOVUE-370) solution 100 mL (100 mLs Intravenous Given 12/27/20 1651)   sodium chloride (PF) 0.9% PF flush 65 mL (65 mLs Intravenous Given 12/27/20 1651)   acetaminophen (TYLENOL) tablet 1,000 mg (1,000 mg Oral Given 12/27/20 1855)   ibuprofen (ADVIL/MOTRIN) tablet 400 mg (400 mg Oral Given 12/27/20 1855)       Disposition:  The patient was discharged to home.     Impression & Plan     Medical Decision Making:  Andressa Lazar is a 40-year-old woman who is afebrile and hemodynamically stable.  She has some mild left lower quadrant tenderness without acute peritoneal signs.  I reviewed her previous urgent care visits.  We discussed the options today and she was agreeable to a CT scan of the abdomen and pelvis which is noted as above with simple diverticulitis.  She completed treatment a few days ago, however, that was empiric treatment and no imaging was performed at that time.  We will go ahead and treat her with Augmentin at this time.  We discussed pain control and she was prescribed Percocet as needed for pain.  I did review her on the [place] prescription monitoring database and she was counseled on the use of opiate pain medication.  We discussed signs and symptoms for which to return and supportive care at home was discussed.  Discussed need to follow-up closely with her primary care physician and discussed that she will likely require colonoscopy after resolution of her illness and she is understanding.  We discussed her questions and she was in no distress at time of discharge.    Diagnosis:    ICD-10-CM    1. Diverticulitis  K57.92 Comprehensive metabolic panel     Lipase   2. Bilateral ovarian cysts  N83.201     N83.202        Discharge Medications:  Discharge Medication List as of 12/27/2020  6:43 PM          Scribe Disclosure:  Carmela JAUREGUI am  serving as a scribe at 3:47 PM on 12/27/2020 to document services personally performed by Momo Welch MD based on my observations and the provider's statements to me.           Momo Welch MD  12/30/20 1106

## 2020-12-27 NOTE — ED AVS SNAPSHOT
[facility] Emergency Dept  [address]  [facility] [address]  Phone: [phone]  Fax: [phone]                                    Adnressa Lazar   MRN: [id number]    Department: [facility] Emergency Dept   Date of Visit: 12/27/2020           After Visit Summary Signature Page    I have received my discharge instructions, and my questions have been answered. I have discussed any challenges I see with this plan with the nurse or doctor.    ..........................................................................................................................................  Patient/Patient Representative Signature      ..........................................................................................................................................  Patient Representative Print Name and Relationship to Patient    ..................................................               ................................................  Date                                   Time    ..........................................................................................................................................  Reviewed by Signature/Title    ...................................................              ..............................................  Date                                               Time          22EPIC Rev 08/18

## 2020-12-28 NOTE — DISCHARGE INSTRUCTIONS
Discharge Instructions  Diverticulitis  Your provider has diagnosed you with diverticulitis.  Diverticulitis is an infection of a diverticulum, which is a tiny sack-like structure that protrudes off the wall of the colon (large intestine).  These sacks are created over years of increased pressure in the colon (this is diverticulosis), usually as a result of a diet without enough fruits, vegetables, and whole grains. Because these sacks are small, bacteria can get trapped inside them and cause an infection (this is divericulitis).  This infection often causes abdominal (belly) pain, fever, nausea (sick to stomach), and vomiting (throwing up). Diverticulitis is usually treated at home.  However, sometimes diverticulitis needs treatment in the hospital and may even need surgery.    Generally, every Emergency Department visit should have a follow-up clinic visit with either a primary or a specialty clinic/provider. Please follow-up as instructed by your emergency provider today.    Return to the Emergency Department if:   You get an oral temperature above 102oF or as directed by your provider.  You have blood in your stools (bright red or black, tarry stools), or have blood in your vomit.  You keep vomiting or cannot drink liquids or cannot keep your medicine down.  You cannot have a bowel movement or you cannot pass gas.  Your stomach gets bloated or bigger.  You faint or become very weak.  Your pain is too bad to tolerate.  You have new symptoms or anything that worries you.    What can I do to help myself?  Fill any antibiotic prescriptions the provider gave you and take them right away. Be sure to finish the whole antibiotic prescription.  For the first day or two at home drink only clear liquids.  This lets your intestines rest.  If your pain has improved after one or two days, you may start eating mild foods. Soda crackers, toast, plain noodles, gelatin, applesauce and bananas are good first choices.  Avoid foods  "that have acid, are spicy, fatty or fibrous (such as meats, coarse grains, vegetables). You may start eating these foods again in about 3-4 days when you are better.  Once you are back to normal, eat a high fiber diet of fruits, vegetables and whole grains. Some people think you should avoid eating nuts, seeds, and corn, but there is no definite proof this makes any difference in whether you will get diverticulitis again.   Pain and fever can be treated with Tylenol  (acetaminophen) or you might have been prescribed a pain medication.    Probiotics: If you have been given an antibiotic, you may want to also take a probiotic pill or eat yogurt with live cultures. Probiotics have \"good bacteria\" to help your intestines stay healthy. Studies have shown that probiotics help prevent diarrhea and other intestine problems (including C. diff infection) when you take antibiotics. You can buy these without a prescription in the pharmacy section of the store.  If you were given a prescription for medicine here today, be sure to read all of the information (including the package insert) that comes with your prescription.  This will include important information about the medicine, its side effects, and any warnings that you need to know about.  The pharmacist who fills the prescription can provide more information and answer questions you may have about the medicine.  If you have questions or concerns that the pharmacist cannot address, please call or return to the Emergency Department.     Remember that you can always come back to the Emergency Department if you are not able to see your regular provider in the amount of time listed above, if you get any new symptoms, or if there is anything that worries you.    Opioid Medication Information    You have been given a prescription for an opioid (narcotic) pain medicine and/or have received a pain medicine while here in the Emergency Department. These medicines can make you drowsy " or impaired. You must not drive, operate dangerous equipment, or engage in any other dangerous activities while taking these medications. If you drive while taking these medications, you could be arrested for driving under the influence (DUI). Do not drink any alcohol while you are taking these medications.     Opioid pain medications can cause addiction. If you have a history of chemical dependency of any type, you are at a higher risk of becoming addicted to pain medications.  Only take these prescribed medications to treat your pain when all other options have been tried. Take it for as short a time and as few doses as possible. Store your pain pills in a secure place, as they are frequently stolen and provide a dangerous opportunity for children or visitors in your house to start abusing these powerful medications. We will not replace any lost or stolen medicine.    If you do not finish your medication, it is a good idea to get rid of it but please do not flush it down the toilet. Please dispose of the remaining medication at a local pharmacy or law enforcement facility. The [place] Pollution Control Agency has additional information on medication disposal: https://www.[employer].[facility].[place]/living-green/managing-unwanted-medications.      Many prescription pain medications contain Tylenol  (acetaminophen), including Vicodin , Tylenol #3 , Norco , Lortab , and Percocet .  You should not take any extra pills of Tylenol  if you are using these prescription medications or you can get very sick.  Do not ever take more than 3000 mg of acetaminophen in any 24 hour period.    All opioids tend to cause constipation. Drink plenty of water and eat foods that have a lot of fiber, such as fruits, vegetables, prune juice, apple juice and high fiber cereal.  Take a laxative if you don t move your bowels at least every other day. Miralax , Milk of Magnesia, Colace , or Senna  can be used to keep you regular.

## 2021-01-15 ENCOUNTER — HEALTH MAINTENANCE LETTER (OUTPATIENT)
Age: 41
End: 2021-01-15

## 2021-10-24 ENCOUNTER — HEALTH MAINTENANCE LETTER (OUTPATIENT)
Age: 41
End: 2021-10-24

## 2022-02-13 ENCOUNTER — HEALTH MAINTENANCE LETTER (OUTPATIENT)
Age: 42
End: 2022-02-13

## 2022-10-15 ENCOUNTER — HEALTH MAINTENANCE LETTER (OUTPATIENT)
Age: 42
End: 2022-10-15

## 2023-03-26 ENCOUNTER — HEALTH MAINTENANCE LETTER (OUTPATIENT)
Age: 43
End: 2023-03-26

## 2024-03-17 ENCOUNTER — HEALTH MAINTENANCE LETTER (OUTPATIENT)
Age: 44
End: 2024-03-17

## 2024-07-30 ENCOUNTER — APPOINTMENT (OUTPATIENT)
Dept: CT IMAGING | Facility: CLINIC | Age: 44
DRG: 391 | End: 2024-07-30
Attending: EMERGENCY MEDICINE
Payer: COMMERCIAL

## 2024-07-30 ENCOUNTER — HOSPITAL ENCOUNTER (INPATIENT)
Facility: CLINIC | Age: 44
LOS: 2 days | Discharge: HOME | DRG: 391 | End: 2024-08-01
Attending: EMERGENCY MEDICINE | Admitting: INTERNAL MEDICINE
Payer: COMMERCIAL

## 2024-07-30 DIAGNOSIS — K57.20 DIVERTICULITIS OF LARGE INTESTINE WITH ABSCESS WITHOUT BLEEDING: ICD-10-CM

## 2024-07-30 DIAGNOSIS — K57.20 DIVERTICULITIS OF LARGE INTESTINE WITH ABSCESS: Primary | ICD-10-CM

## 2024-07-30 LAB
ALBUMIN SERPL BCG-MCNC: 4.4 G/DL (ref 3.5–5.2)
ALP SERPL-CCNC: 68 U/L (ref 40–150)
ALT SERPL W P-5'-P-CCNC: 17 U/L (ref 0–50)
ANION GAP SERPL CALCULATED.3IONS-SCNC: 13 MMOL/L (ref 7–15)
AST SERPL W P-5'-P-CCNC: 18 U/L (ref 0–45)
BASOPHILS # BLD AUTO: 0 10E3/UL (ref 0–0.2)
BASOPHILS NFR BLD AUTO: 0 %
BILIRUB SERPL-MCNC: 0.9 MG/DL
BUN SERPL-MCNC: 7.6 MG/DL (ref 6–20)
CALCIUM SERPL-MCNC: 9.4 MG/DL (ref 8.8–10.4)
CHLORIDE SERPL-SCNC: 106 MMOL/L (ref 98–107)
CREAT SERPL-MCNC: 0.65 MG/DL (ref 0.51–0.95)
EGFRCR SERPLBLD CKD-EPI 2021: >90 ML/MIN/1.73M2
EOSINOPHIL # BLD AUTO: 0.1 10E3/UL (ref 0–0.7)
EOSINOPHIL NFR BLD AUTO: 1 %
ERYTHROCYTE [DISTWIDTH] IN BLOOD BY AUTOMATED COUNT: 12.5 % (ref 10–15)
GLUCOSE SERPL-MCNC: 126 MG/DL (ref 70–99)
HCG INTACT+B SERPL-ACNC: <1 MIU/ML
HCO3 SERPL-SCNC: 21 MMOL/L (ref 22–29)
HCT VFR BLD AUTO: 43.9 % (ref 35–47)
HGB BLD-MCNC: 15.4 G/DL (ref 11.7–15.7)
HOLD SPECIMEN: NORMAL
HOLD SPECIMEN: NORMAL
IMM GRANULOCYTES # BLD: 0 10E3/UL
IMM GRANULOCYTES NFR BLD: 0 %
LYMPHOCYTES # BLD AUTO: 1.4 10E3/UL (ref 0.8–5.3)
LYMPHOCYTES NFR BLD AUTO: 20 %
MCH RBC QN AUTO: 30.9 PG (ref 26.5–33)
MCHC RBC AUTO-ENTMCNC: 35.1 G/DL (ref 31.5–36.5)
MCV RBC AUTO: 88 FL (ref 78–100)
MONOCYTES # BLD AUTO: 0.4 10E3/UL (ref 0–1.3)
MONOCYTES NFR BLD AUTO: 6 %
NEUTROPHILS # BLD AUTO: 5.2 10E3/UL (ref 1.6–8.3)
NEUTROPHILS NFR BLD AUTO: 72 %
NRBC # BLD AUTO: 0 10E3/UL
NRBC BLD AUTO-RTO: 0 /100
PLATELET # BLD AUTO: 175 10E3/UL (ref 150–450)
POTASSIUM SERPL-SCNC: 3.9 MMOL/L (ref 3.4–5.3)
PROT SERPL-MCNC: 7.4 G/DL (ref 6.4–8.3)
RBC # BLD AUTO: 4.99 10E6/UL (ref 3.8–5.2)
SODIUM SERPL-SCNC: 140 MMOL/L (ref 135–145)
WBC # BLD AUTO: 7.2 10E3/UL (ref 4–11)

## 2024-07-30 PROCEDURE — 99285 EMERGENCY DEPT VISIT HI MDM: CPT | Mod: 25

## 2024-07-30 PROCEDURE — 80053 COMPREHEN METABOLIC PANEL: CPT | Performed by: EMERGENCY MEDICINE

## 2024-07-30 PROCEDURE — 250N000011 HC RX IP 250 OP 636: Performed by: EMERGENCY MEDICINE

## 2024-07-30 PROCEDURE — 250N000013 HC RX MED GY IP 250 OP 250 PS 637: Performed by: INTERNAL MEDICINE

## 2024-07-30 PROCEDURE — 258N000003 HC RX IP 258 OP 636: Performed by: INTERNAL MEDICINE

## 2024-07-30 PROCEDURE — 96374 THER/PROPH/DIAG INJ IV PUSH: CPT

## 2024-07-30 PROCEDURE — 36415 COLL VENOUS BLD VENIPUNCTURE: CPT | Performed by: STUDENT IN AN ORGANIZED HEALTH CARE EDUCATION/TRAINING PROGRAM

## 2024-07-30 PROCEDURE — 74177 CT ABD & PELVIS W/CONTRAST: CPT

## 2024-07-30 PROCEDURE — 85025 COMPLETE CBC W/AUTO DIFF WBC: CPT | Performed by: EMERGENCY MEDICINE

## 2024-07-30 PROCEDURE — 250N000009 HC RX 250: Performed by: EMERGENCY MEDICINE

## 2024-07-30 PROCEDURE — 84702 CHORIONIC GONADOTROPIN TEST: CPT | Performed by: EMERGENCY MEDICINE

## 2024-07-30 PROCEDURE — 250N000011 HC RX IP 250 OP 636: Performed by: INTERNAL MEDICINE

## 2024-07-30 PROCEDURE — 85041 AUTOMATED RBC COUNT: CPT | Performed by: STUDENT IN AN ORGANIZED HEALTH CARE EDUCATION/TRAINING PROGRAM

## 2024-07-30 PROCEDURE — 99222 1ST HOSP IP/OBS MODERATE 55: CPT | Performed by: INTERNAL MEDICINE

## 2024-07-30 PROCEDURE — 120N000001 HC R&B MED SURG/OB

## 2024-07-30 RX ORDER — POLYETHYLENE GLYCOL 3350 17 G/17G
17 POWDER, FOR SOLUTION ORAL 2 TIMES DAILY PRN
Status: DISCONTINUED | OUTPATIENT
Start: 2024-07-30 | End: 2024-08-01 | Stop reason: HOSPADM

## 2024-07-30 RX ORDER — SODIUM CHLORIDE 9 MG/ML
INJECTION, SOLUTION INTRAVENOUS CONTINUOUS
Status: DISCONTINUED | OUTPATIENT
Start: 2024-07-30 | End: 2024-07-31

## 2024-07-30 RX ORDER — AMOXICILLIN 250 MG
1 CAPSULE ORAL 2 TIMES DAILY PRN
Status: DISCONTINUED | OUTPATIENT
Start: 2024-07-30 | End: 2024-08-01 | Stop reason: HOSPADM

## 2024-07-30 RX ORDER — CALCIUM CARBONATE 500 MG/1
1000 TABLET, CHEWABLE ORAL 4 TIMES DAILY PRN
Status: DISCONTINUED | OUTPATIENT
Start: 2024-07-30 | End: 2024-08-01 | Stop reason: HOSPADM

## 2024-07-30 RX ORDER — IOPAMIDOL 755 MG/ML
500 INJECTION, SOLUTION INTRAVASCULAR ONCE
Status: COMPLETED | OUTPATIENT
Start: 2024-07-30 | End: 2024-07-30

## 2024-07-30 RX ORDER — ONDANSETRON 2 MG/ML
4 INJECTION INTRAMUSCULAR; INTRAVENOUS EVERY 6 HOURS PRN
Status: DISCONTINUED | OUTPATIENT
Start: 2024-07-30 | End: 2024-08-01 | Stop reason: HOSPADM

## 2024-07-30 RX ORDER — LIDOCAINE 40 MG/G
CREAM TOPICAL
Status: DISCONTINUED | OUTPATIENT
Start: 2024-07-30 | End: 2024-08-01 | Stop reason: HOSPADM

## 2024-07-30 RX ORDER — PIPERACILLIN SODIUM, TAZOBACTAM SODIUM 3; .375 G/15ML; G/15ML
3.38 INJECTION, POWDER, LYOPHILIZED, FOR SOLUTION INTRAVENOUS EVERY 6 HOURS
Status: DISCONTINUED | OUTPATIENT
Start: 2024-07-30 | End: 2024-08-01

## 2024-07-30 RX ORDER — MULTIVITAMIN,THERAPEUTIC
1 TABLET ORAL DAILY
COMMUNITY

## 2024-07-30 RX ORDER — AMOXICILLIN 250 MG
2 CAPSULE ORAL 2 TIMES DAILY PRN
Status: DISCONTINUED | OUTPATIENT
Start: 2024-07-30 | End: 2024-08-01 | Stop reason: HOSPADM

## 2024-07-30 RX ORDER — ONDANSETRON 4 MG/1
4 TABLET, ORALLY DISINTEGRATING ORAL EVERY 6 HOURS PRN
Status: DISCONTINUED | OUTPATIENT
Start: 2024-07-30 | End: 2024-08-01 | Stop reason: HOSPADM

## 2024-07-30 RX ORDER — HYDROMORPHONE HCL IN WATER/PF 6 MG/30 ML
0.2 PATIENT CONTROLLED ANALGESIA SYRINGE INTRAVENOUS
Status: DISCONTINUED | OUTPATIENT
Start: 2024-07-30 | End: 2024-08-01 | Stop reason: HOSPADM

## 2024-07-30 RX ORDER — HYDROMORPHONE HCL IN WATER/PF 6 MG/30 ML
0.4 PATIENT CONTROLLED ANALGESIA SYRINGE INTRAVENOUS
Status: DISCONTINUED | OUTPATIENT
Start: 2024-07-30 | End: 2024-08-01 | Stop reason: HOSPADM

## 2024-07-30 RX ORDER — KETOROLAC TROMETHAMINE 15 MG/ML
15 INJECTION, SOLUTION INTRAMUSCULAR; INTRAVENOUS ONCE
Status: COMPLETED | OUTPATIENT
Start: 2024-07-30 | End: 2024-07-30

## 2024-07-30 RX ORDER — NICOTINE 21 MG/24HR
1 PATCH, TRANSDERMAL 24 HOURS TRANSDERMAL DAILY
Status: DISCONTINUED | OUTPATIENT
Start: 2024-07-30 | End: 2024-08-01 | Stop reason: HOSPADM

## 2024-07-30 RX ORDER — ACETAMINOPHEN 650 MG/1
650 SUPPOSITORY RECTAL EVERY 4 HOURS PRN
Status: DISCONTINUED | OUTPATIENT
Start: 2024-07-30 | End: 2024-08-01 | Stop reason: HOSPADM

## 2024-07-30 RX ORDER — LEVALBUTEROL TARTRATE 45 UG/1
2 AEROSOL, METERED ORAL EVERY 4 HOURS PRN
COMMUNITY

## 2024-07-30 RX ORDER — HYDROMORPHONE HYDROCHLORIDE 2 MG/1
2 TABLET ORAL EVERY 4 HOURS PRN
Status: DISCONTINUED | OUTPATIENT
Start: 2024-07-30 | End: 2024-08-01 | Stop reason: HOSPADM

## 2024-07-30 RX ORDER — PIPERACILLIN SODIUM, TAZOBACTAM SODIUM 4; .5 G/20ML; G/20ML
4.5 INJECTION, POWDER, LYOPHILIZED, FOR SOLUTION INTRAVENOUS ONCE
Status: COMPLETED | OUTPATIENT
Start: 2024-07-30 | End: 2024-07-30

## 2024-07-30 RX ORDER — ACETAMINOPHEN 325 MG/1
650 TABLET ORAL EVERY 4 HOURS PRN
Status: DISCONTINUED | OUTPATIENT
Start: 2024-07-30 | End: 2024-08-01 | Stop reason: HOSPADM

## 2024-07-30 RX ADMIN — SENNOSIDES AND DOCUSATE SODIUM 2 TABLET: 50; 8.6 TABLET ORAL at 15:40

## 2024-07-30 RX ADMIN — SODIUM CHLORIDE, PRESERVATIVE FREE: 5 INJECTION INTRAVENOUS at 11:42

## 2024-07-30 RX ADMIN — PIPERACILLIN AND TAZOBACTAM 4.5 G: 4; .5 INJECTION, POWDER, FOR SOLUTION INTRAVENOUS at 11:11

## 2024-07-30 RX ADMIN — SODIUM CHLORIDE 100 ML: 9 INJECTION, SOLUTION INTRAVENOUS at 09:27

## 2024-07-30 RX ADMIN — HYDROMORPHONE HYDROCHLORIDE 0.2 MG: 0.2 INJECTION, SOLUTION INTRAMUSCULAR; INTRAVENOUS; SUBCUTANEOUS at 11:59

## 2024-07-30 RX ADMIN — PIPERACILLIN AND TAZOBACTAM 3.38 G: 3; .375 INJECTION, POWDER, FOR SOLUTION INTRAVENOUS at 18:00

## 2024-07-30 RX ADMIN — HYDROMORPHONE HYDROCHLORIDE 0.2 MG: 0.2 INJECTION, SOLUTION INTRAMUSCULAR; INTRAVENOUS; SUBCUTANEOUS at 15:40

## 2024-07-30 RX ADMIN — SODIUM CHLORIDE, PRESERVATIVE FREE: 5 INJECTION INTRAVENOUS at 18:08

## 2024-07-30 RX ADMIN — NICOTINE 1 PATCH: 14 PATCH, EXTENDED RELEASE TRANSDERMAL at 18:01

## 2024-07-30 RX ADMIN — KETOROLAC TROMETHAMINE 15 MG: 15 INJECTION, SOLUTION INTRAMUSCULAR; INTRAVENOUS at 09:18

## 2024-07-30 RX ADMIN — PIPERACILLIN AND TAZOBACTAM 3.38 G: 3; .375 INJECTION, POWDER, FOR SOLUTION INTRAVENOUS at 22:34

## 2024-07-30 RX ADMIN — HYDROMORPHONE HYDROCHLORIDE 2 MG: 2 TABLET ORAL at 19:43

## 2024-07-30 RX ADMIN — IOPAMIDOL 86 ML: 755 INJECTION, SOLUTION INTRAVENOUS at 09:27

## 2024-07-30 ASSESSMENT — ACTIVITIES OF DAILY LIVING (ADL)
ADLS_ACUITY_SCORE: 35
ADLS_ACUITY_SCORE: 35
ADLS_ACUITY_SCORE: 20
ADLS_ACUITY_SCORE: 20
ADLS_ACUITY_SCORE: 35
ADLS_ACUITY_SCORE: 20
ADLS_ACUITY_SCORE: 20
ADLS_ACUITY_SCORE: 35
ADLS_ACUITY_SCORE: 20
ADLS_ACUITY_SCORE: 35
ADLS_ACUITY_SCORE: 33
ADLS_ACUITY_SCORE: 20
ADLS_ACUITY_SCORE: 35
ADLS_ACUITY_SCORE: 35
ADLS_ACUITY_SCORE: 20

## 2024-07-30 ASSESSMENT — COLUMBIA-SUICIDE SEVERITY RATING SCALE - C-SSRS
1. IN THE PAST MONTH, HAVE YOU WISHED YOU WERE DEAD OR WISHED YOU COULD GO TO SLEEP AND NOT WAKE UP?: NO
2. HAVE YOU ACTUALLY HAD ANY THOUGHTS OF KILLING YOURSELF IN THE PAST MONTH?: NO
6. HAVE YOU EVER DONE ANYTHING, STARTED TO DO ANYTHING, OR PREPARED TO DO ANYTHING TO END YOUR LIFE?: NO

## 2024-07-30 NOTE — LETTER
[place] [address] [facility]  [address]  [facility] [address]  [phone]    2024    Re: Andressa Lazar  [address] [facility] [address]  [phone] (home)     : 1980      To Whom It May Concern:      Andressa Lazar was hospitalized from 2024 until 2024  4:07 PM due to medical illness.  She may return to work on 24 with full duty.        Sincerely,        Elvis Weiss, DO

## 2024-07-30 NOTE — ED PROVIDER NOTES
"  Emergency Department Note      History of Present Illness     Chief Complaint   Abdominal Pain      HPI   Andressa Lazar is a 43 year old female with history of recurrent diverticulitis, ovarian cyst, and tobacco abuse who presents to the ED for evaluation of abdominal pain. Patient presents with left sided lower abdominal pain for the past few days. Reports a history of 8 episodes of diverticulitis and states today's pain and symptoms feel the same. She has been prescribed antibiotics for previous episodes. She called her Family Medicine nurse line yesterday and was referred to the ED for further work up and evaluation. Andressa notes difficulty with bowel movements and states the diverticulitis flares occur every 3 months. This causes her to miss work and makes it difficult to care for her son. She took ibuprofen yesterday morning with no relief. Denies fever, chills, or myalgias. Of note, patient reports diverticulitis runs in the family on her mother's side but they have had surgical intervention. She has been seen several times at the ED and [facility] but the pain is \"debilitating.\" She would like a solution as she cannot keep missing work.       Independent Historian   None    Review of External Notes   Reviewed telemedicine note from yesterday.    Past Medical History     Medical History and Problem List   Gestational diabetes mellitus  Asthma  Chronic urticaria  Diverticulitis  Marijuana use  Tobacco abuse   Multiple miscarriages   Lyme disease  Chicken pox  Bilateral ovarian cysts    Medications   Xopenex HFA  Percocet  Mirena     Surgical History   Rhinoplasty  Ulnar nerve repair right arm   Ear tubes  Septoplasty     Physical Exam     Patient Vitals for the past 24 hrs:   BP Temp Pulse Resp SpO2 Height Weight   07/30/24 0809 107/72 98.5  F (36.9  C) 84 16 99 % -- --   07/30/24 0806 -- -- -- -- -- 1.651 m (5' 5\") 78.6 kg (173 lb 4.5 oz)     Physical Exam  Nursing note and vitals reviewed.  HENT: "   Mouth/Throat: Moist mucous membranes.   Eyes: EOMI, nonicteric sclera  Cardiovascular: Normal rate, regular rhythm, no murmurs, rubs, or gallops  Pulmonary/Chest: Effort normal and breath sounds normal. No respiratory distress. No wheezes. No rales.   Abdominal: Soft. Mild TTP LLQ, nondistended, no guarding or rigidity.   Musculoskeletal: Normal range of motion.   Neurological: Alert. Moves all extremities spontaneously.   Skin: Skin is warm and dry. No rash noted.         Diagnostics     Lab Results   Labs Ordered and Resulted from Time of ED Arrival to Time of ED Departure   COMPREHENSIVE METABOLIC PANEL - Abnormal       Result Value    Sodium 140      Potassium 3.9      Carbon Dioxide (CO2) 21 (*)     Anion Gap 13      Urea Nitrogen 7.6      Creatinine 0.65      GFR Estimate >90      Calcium 9.4      Chloride 106      Glucose 126 (*)     Alkaline Phosphatase 68      AST 18      ALT 17      Protein Total 7.4      Albumin 4.4      Bilirubin Total 0.9     HCG QUANTITATIVE PREGNANCY - Normal    hCG Quantitative <1     CBC WITH PLATELETS AND DIFFERENTIAL    WBC Count 7.2      RBC Count 4.99      Hemoglobin 15.4      Hematocrit 43.9      MCV 88      MCH 30.9      MCHC 35.1      RDW 12.5      Platelet Count 175      % Neutrophils 72      % Lymphocytes 20      % Monocytes 6      % Eosinophils 1      % Basophils 0      % Immature Granulocytes 0      NRBCs per 100 WBC 0      Absolute Neutrophils 5.2      Absolute Lymphocytes 1.4      Absolute Monocytes 0.4      Absolute Eosinophils 0.1      Absolute Basophils 0.0      Absolute Immature Granulocytes 0.0      Absolute NRBCs 0.0         Imaging   CT Abdomen Pelvis w Contrast   Preliminary Result   IMPRESSION:    1.  Acute sigmoid diverticulitis with adjacent severe edema. Two   locules of small fluid measuring 1.1 cm and 1.7 cm near the   diverticulitis compatible with small abscesses. No free air identified   at this time.          Independent Interpretation   None    ED  Course      Medications Administered   Medications   sodium chloride 0.9 % bag 100 mL for CT scan flush use (100 mLs As instructed $Given 7/30/24 0927)   piperacillin-tazobactam (ZOSYN) 4.5 g vial to attach to  mL bag (has no administration in time range)   ketorolac (TORADOL) injection 15 mg (15 mg Intravenous $Given 7/30/24 0918)   iopamidol (ISOVUE-370) solution 500 mL (86 mLs Intravenous $Given 7/30/24 0927)       Procedures   Procedures     Discussion of Management   Admitting Hospitalist, Dr. Weiss  Colorectal Surgery, Mahesh Verdugo PA-C    ED Course   ED Course as of 07/30/24 1037   Tue Jul 30, 2024 0902 I obtained history and examined the patient as noted above.    1021 I rechecked the patient and explained findings.    1033 I spoke with Dr. Weiss, hospitalist, regarding the patient's history and presentation in the emergency department today.     1036 I spoke with Nikki Haley PA-C, regarding the patient's history and presentation in the emergency department today.         Optional/Additional Documentation  None    Medical Decision Making / Diagnosis     CMS Diagnoses: None    MIPS       None    MDM   Andressa Lazar is a 43 year old female who presents with chief complaint left lower quadrant abdominal pain in the context of multiple prior episodes of diverticulitis.  Broad differential considered including diverticulitis, perforation, abscess, appendicitis, bowel obstruction, kidney stone, among many other etiologies.  Vital signs normal and abdominal exam quite benign.  However, given history of multiple episodes, discussed obtaining imaging for further evaluation which patient agreed with.  CT shows 2 diverticular abscesses.  None of which appear big enough to drain.  Patient initiated on IV Zosyn.  Discussed with hospitalist, and also discussed with colorectal surgery PA.  Patient accepted for admission.  All of her questions were answered and she is in agreement with the  plan.    Disposition   The patient was admitted to the hospital.     Diagnosis     ICD-10-CM    1. Diverticulitis of large intestine with abscess without bleeding  K57.20              Scribe Disclosure:  I, Deann Reza, am serving as a scribe at 9:21 AM on 7/30/2024 to document services personally performed by Edgar Ortiz MD based on my observations and the provider's statements to me.        Edgar Ortiz MD  07/30/24 7581

## 2024-07-30 NOTE — H&P
[facility]  History and Physical  Hospitalist - Elvis Weiss DO       Date of Admission:  7/30/2024    Chief Complaint   Lower abdominal pain    History is obtained from the patient, the emergency department physician, as well as electronic medical record.    History of Present Illness   Andressa Lazar is a 43 year old female with past medical history of 8 previous episodes of diverticulitis, chronic joint pain, active nicotine dependence with cigarettes who presented on 7/30/2024 with chief complaint of lower abdominal pain.  The patient reports that for the past few days she has had lower abdominal pain, similar to her previous episodes of acute diverticulitis.  She reports her last bowel movement was yesterday.  Her most recent episode of diverticulitis was in 4/2024 and treated with ciprofloxacin and Flagyl.  She has never seen a surgeon regarding this issue.    In the emergency department, the patient was found to have a temperature of 98.5  F, heart rate 84, blood pressure 107/72, respiratory rate 16, SpO2 99% on room air.  Initial lab work showed bicarb 21, glucose 126, WBC 7.2.  CT abdomen and pelvis showed acute sigmoid diverticulitis with adjacent severe edema and 2 locules of small fluid measuring 1.1 cm and 1.7 cm near the diverticulitis compatible with small abscesses.  The patient was made n.p.o., started on IV fluids, provided with pain control.  Colorectal surgery was consulted to see the patient.    ASSESSMENT/PLAN    Acute Intractable Abdominal Pain  Acute Sigmoid Diverticulitis with Two Abscesses  Recurrent Diverticulitis  - Appreciate CRS recommendations  - Clear liquid diet  - Pain control prn  - Zosyn  - Colonoscopy in 4/2021 showed multiple small mouth diverticula in the sigmoid and descending colon and no other acute abnormalities    Chronic Medical Problems:  Chronic Joint Pain  Active Nicotine Dependence with Cigarettes    PLAN: Resume home medications as appropriate  once confirmed by pharmacy.     I spent 46 minutes in reviewing this patient's labs, imaging, medications, medical history.  In addition time was spent interviewing the patient, communicating with family, and medical decision making.  Time was also spent reviewing notes of outpatient PCP, previous colonoscopy results.    DVT Prophylaxis: Pneumatic Compression Devices  Code Status: Full Code  Disposition: Medically Ready for Discharge: Anticipated Tomorrow (1-2 days)  Goals to discharge include: tolerating oral diet, pain improving, surgical plan in place    Elvis Weiss DO  Securely message with Vocera (more info)  Text page via [employer]     Primary Care Physician   [facility]    -----------------------------------------------------------------------------------------------------------------------------------------------------------------------------------------------------    Past Medical History    I have reviewed this patient's medical history and updated it with pertinent information if needed.   Past Medical History:   Diagnosis Date    Diabetes (H)     Gestational on insulin    Uncomplicated asthma     in the past       Past Surgical History   I have reviewed this patient's surgical history and updated it with pertinent information if needed.  Past Surgical History:   Procedure Laterality Date    ENT SURGERY      rhinoplasty    ORTHOPEDIC SURGERY      ulnar nerve repair-right arm       Prior to Admission Medications   Prior to Admission Medications   Prescriptions Last Dose Informant Patient Reported? Taking?   Cetirizine-Pseudoephedrine (ZYRTEC-D PO) 7/30/2024 at am  Yes Yes   Sig: Take 1 tablet by mouth daily 24hour Zyrtec*   Probiotic Product (PROBIOTIC PO) 7/29/2024  Yes Yes   Sig: Take 1 capsule by mouth daily   levalbuterol (XOPENEX HFA) 45 MCG/ACT inhaler prn  Yes Yes   Sig: Inhale 2 puffs into the lungs every 4 hours as needed for shortness of breath or wheezing    multivitamin, therapeutic (THERA-VIT) TABS tablet 7/29/2024  Yes Yes   Sig: Take 1 tablet by mouth daily      Facility-Administered Medications: None     Allergies   No Known Allergies    Social History   I have reviewed this patient's social history and updated it with pertinent information if needed. Andressa Lazar  reports that she has quit smoking. She has never used smokeless tobacco. She reports current drug use. Drug: Marijuana. She reports that she does not drink alcohol.    Family History   I have reviewed this patient's family history and updated it with pertinent information if needed.   No family history on file.    -----------------------------------------------------------------------------------------------------------------------------------------------------------------------------------------------------    Review of Systems   The 10 point Review of Systems is negative other than noted in the HPI or here.     Physical Exam   Temp: 98.5  F (36.9  C)   BP: 110/79 Pulse: 60   Resp: 16 SpO2: 97 % O2 Device: None (Room air)    Vital Signs with Ranges  Temp:  [98.5  F (36.9  C)] 98.5  F (36.9  C)  Pulse:  [60-84] 60  Resp:  [16] 16  BP: (107-110)/(72-79) 110/79  SpO2:  [97 %-99 %] 97 %  173 lbs 4.5 oz    Constitutional: Awake, alert, cooperative, no apparent distress.  Eyes: Conjunctiva and pupils examined and normal.  HEENT: Moist mucous membranes, normal dentition.  Respiratory: Clear to auscultation bilaterally, no crackles or wheezing.  Cardiovascular: Regular rate and rhythm, normal S1 and S2, and no murmur noted.  GI: Soft, non-distended, non-tender, normal bowel sounds.  Lymph/Hematologic: No anterior cervical or supraclavicular adenopathy.  Skin: No rashes, no cyanosis, no edema.  Musculoskeletal: No joint swelling, erythema or tenderness.  Neurologic: Cranial nerves 2-12 intact, normal strength and sensation.  Psychiatric: Alert, oriented to person, place and time, no obvious anxiety or  depression.     Data     Recent Labs   Lab 07/30/24  0818   WBC 7.2   HGB 15.4   MCV 88         POTASSIUM 3.9   CHLORIDE 106   CO2 21*   BUN 7.6   CR 0.65   ANIONGAP 13   NATALY 9.4   *   ALBUMIN 4.4   PROTTOTAL 7.4   BILITOTAL 0.9   ALKPHOS 68   ALT 17   AST 18       Recent Results (from the past 24 hour(s))   CT Abdomen Pelvis w Contrast    Narrative    CT ABDOMEN AND PELVIS WITH CONTRAST 7/30/2024 9:36 AM    CLINICAL HISTORY: LLQ abdominal pain, mult hx diverticulitis.    TECHNIQUE: CT scan of the abdomen and pelvis was performed following  injection of IV contrast. Multiplanar reformats were obtained. Dose  reduction techniques were used.    CONTRAST: 86mL Isovue-370    COMPARISON: CT 12/27/2020.    FINDINGS:   LOWER CHEST: Calcified granuloma at the left lung base. Groundglass  opacities at the right lung base.    HEPATOBILIARY: Normal.    PANCREAS: Normal.    SPLEEN: Normal.    ADRENAL GLANDS: Normal.    KIDNEYS/BLADDER: No hydronephrosis or urolithiasis. Small cyst lower  left kidney without specific imaging follow-up recommended. Mild wall  thickening of the bladder may relate to limited distention.    BOWEL: Acute diverticulitis at the sigmoid colon with wall thickening  and adjacent severe inflammatory edema. Small locules of fluid  posterior to the sigmoid colon measuring 1.7 cm and 1.1 cm series 3  image 156 with some mild peripheral enhancement. No visible free air.  Normal appendix. No bowel obstruction.    PELVIC ORGANS: See above bowel section. IUD within the central uterus.    ADDITIONAL FINDINGS: None.    MUSCULOSKELETAL: Unremarkable.      Impression    IMPRESSION:   1.  Acute sigmoid diverticulitis with adjacent severe edema. Two  locules of small fluid measuring 1.1 cm and 1.7 cm near the  diverticulitis compatible with small abscesses. No free air identified  at this time.

## 2024-07-30 NOTE — CONSULTS
[facility]  Colon and Rectal Surgery Consult Note  Name: Andressa Lazar    MRN: [id number]  YOB: 1980    Age: 43 year old  Date of admission: 7/30/2024  Primary care provider: [facility], [facility]     Requesting Physician: Dr. Ortiz  Reason for consult:  Diverticulitis with abscess           History of Present Illness:   Andressa Lazar is a 43 year old female with history of recurrent diverticulitis, ovarian cyst, and tobacco abuse, seen at the request of Dr. Ortiz, presents with abdominal pain. Patient reports a history of 8 episodes of diverticulitis since 2018, with four of these in the past year. She states she has been prescribed antibiotics for previous episodes. She has never been hospitalized for any of these episodes. Her most recent flare was in April 2024. Patient reports developing similar pain on Saturday and called her Family Medicine nurse line yesterday. She was referred to the ED for further evaluation. In the ED, BP is 107/72, HR 84, temperature 98.5F, RR 16, and SpO2 99%. Laboratory workup with unremarkable CMP and CBC. CT abdomen/pelvis was obtained, which demonstrated acute sigmoid diverticulitis with adjacent severe edema. There are two locules of small fluid measuring 1.1 cm and 1.7 cm near the diverticulitis compatible with small abscess. There is no free air identified. Patient was started on IV Zosyn and CRS was consulted.    Today, patient is resting comfortably in bed. She reports she was feeling well up until Saturday night when she developed acute lower abdominal pain. Denies any fevers, chills, nausea, or vomiting. She had been having regular stools up until this morning when they were on the looser side. Pain is currently controlled with pain medication.     Of note, patient reports several family members with history of diverticulitis. Her mother recently had a colectomy for diverticulitis in December 2023. Patient denies any known  "family history of colorectal cancer, ulcerative colitis, or Crohn's disease.     Per patient, she smokes half a pack of cigarettes daily. She also smokes marijuana on the weekends. Drinks alcohol occasionally, a few drinks per week.    Colonoscopy History:  4/20/21: Multiple small-mouthed diverticula in the sigmoid and descending colon, no other abnormalities identified. Recall of 10 years.    Surgical History: No previous abdominal surgeries            Past Medical History:     Past Medical History:   Diagnosis Date    Diabetes (H)     Gestational on insulin    Uncomplicated asthma     in the past             Past Surgical History:     Past Surgical History:   Procedure Laterality Date    ENT SURGERY      rhinoplasty    ORTHOPEDIC SURGERY      ulnar nerve repair-right arm               Social History:     Social History     Tobacco Use    Smoking status: Former     Types: Cigarettes    Smokeless tobacco: Never    Tobacco comments:     quit in early pregnancy   Substance Use Topics    Alcohol use: Never             Family History:   No family history on file.          Allergies:   No Known Allergies          Medications:     Current Facility-Administered Medications   Medication Dose Route Frequency Provider Last Rate Last Admin    piperacillin-tazobactam (ZOSYN) 4.5 g vial to attach to  mL bag  4.5 g Intravenous Once Edgar Ortiz MD                 Review of Systems:   A comprehensive greater than 10 system review of systems was carried out.  Pertinent positives and negatives are noted above.  Otherwise negative for contributory info.            Physical Exam:     Blood pressure 107/72, pulse 84, temperature 98.5  F (36.9  C), resp. rate 16, height 1.651 m (5' 5\"), weight 78.6 kg (173 lb 4.5 oz), SpO2 99%, not currently breastfeeding.  No intake or output data in the 24 hours ending 07/30/24 1106  EXAM:  GEN: Awake alert and oriented, appears stated age  PULM: Non-labored breathing with normal " respiratory effort  CVS: reg rate and rhythm, no peripheral edema  ABD: Soft, moderately tender in lower abdomen, non-distended. No rebound or guarding.   RECTAL: Rectal exam was deferred.  NEURO: CN II-XII grossly intact  MSK: extremeties with no clubbing, cyanosis or edema; able to ambulate  PSYCH: responsive, alert, cooperative; oriented x3; appropriate mood and affect  EXT/SKIN: inspection reveals no rashes, lesions or ulcers, normal coloring         Data Reviewed:     Results for orders placed or performed during the hospital encounter of 07/30/24   CT Abdomen Pelvis w Contrast    Narrative    CT ABDOMEN AND PELVIS WITH CONTRAST 7/30/2024 9:36 AM    CLINICAL HISTORY: LLQ abdominal pain, mult hx diverticulitis.    TECHNIQUE: CT scan of the abdomen and pelvis was performed following  injection of IV contrast. Multiplanar reformats were obtained. Dose  reduction techniques were used.    CONTRAST: 86mL Isovue-370    COMPARISON: CT 12/27/2020.    FINDINGS:   LOWER CHEST: Calcified granuloma at the left lung base. Groundglass  opacities at the right lung base.    HEPATOBILIARY: Normal.    PANCREAS: Normal.    SPLEEN: Normal.    ADRENAL GLANDS: Normal.    KIDNEYS/BLADDER: No hydronephrosis or urolithiasis. Small cyst lower  left kidney without specific imaging follow-up recommended. Mild wall  thickening of the bladder may relate to limited distention.    BOWEL: Acute diverticulitis at the sigmoid colon with wall thickening  and adjacent severe inflammatory edema. Small locules of fluid  posterior to the sigmoid colon measuring 1.7 cm and 1.1 cm series 3  image 156 with some mild peripheral enhancement. No visible free air.  Normal appendix. No bowel obstruction.    PELVIC ORGANS: See above bowel section. IUD within the central uterus.    ADDITIONAL FINDINGS: None.    MUSCULOSKELETAL: Unremarkable.      Impression    IMPRESSION:   1.  Acute sigmoid diverticulitis with adjacent severe edema. Two  locules of small fluid  "measuring 1.1 cm and 1.7 cm near the  diverticulitis compatible with small abscesses. No free air identified  at this time.       Recent Labs   Lab 07/30/24  0818   WBC 7.2   HGB 15.4   HCT 43.9   MCV 88        Recent Labs   Lab 07/30/24  0818      POTASSIUM 3.9   CHLORIDE 106   CO2 21*   ANIONGAP 13   *   BUN 7.6   CR 0.65   GFRESTIMATED >90   NATALY 9.4   PROTTOTAL 7.4   ALBUMIN 4.4   BILITOTAL 0.9   ALKPHOS 68   AST 18   ALT 17     Recent Labs   Lab 07/30/24  0818   HGB 15.4     No results for input(s): \"INR\" in the last 168 hours.  No results for input(s): \"LACT\" in the last 168 hours.  No results for input(s): \"LIPASE\" in the last 168 hours.  No results for input(s): \"COLOR\", \"APPEARANCE\", \"URINEGLC\", \"URINEBILI\", \"URINEKETONE\", \"SG\", \"UBLD\", \"URINEPH\", \"PROTEIN\", \"UROBILINOGEN\", \"NITRITE\", \"LEUKEST\", \"RBCU\", \"WBCU\" in the last 168 hours.      Assessment and Plan:   Andressa Lazar is a 43 year old female with history of recurrent diverticulitis, ovarian cyst, and tobacco abuse, seen at the request of Dr. Ortiz, presents with 2-3 days of abdominal pain. Patient has had multiple episodes of diverticulitis, with flares approximately every 3 months in the past year. She has never been hospitalized for diverticulitis in the past. CT abdomen/pelvis was obtained in the ED, which demonstrated acute sigmoid diverticulitis with adjacent severe edema. There are two locules of small fluid measuring 1.1 cm and 1.7 cm near the diverticulitis compatible with small abscess. There is no free air identified. WBC is normal. Abdominal exam is benign with moderate tenderness in the lower abdomen. Patient is currently hemodynamically stable and no emergent surgery is indicated. Recommend continued conservative management including bowel rest, IVF, IV antibiotics, and pain management as needed. Did briefly discuss that if she does not have any improvement or acutely worsens, she may require a repeat CT scan to " evaluate for more drainable fluid collection. Discussed that if she does not improve conservatively, she may require more emergent surgery which would likely entail a sigmoid resection with stoma. If she does well this admission, recommended she follow up outpatient to discuss elective surgery given her frequent recurrences. If she develops any acute worsening symptoms, please page colorectal. We will continue to follow along.       Plan:  Admit to hospitalist  Surgery: No emergent surgery indicated  Diet: Clears  IV Fluids: per hospitalist  Antibiotics:  IV Zosyn  Medications: Continue home meds  I&O s:  strict I&O s  Labs:   - Reviewed: by myself and Dr. Gudino  - Ordered:  None  Imaging:   - Dr. Gudino and myself have personally viewed: CT abd/pelvis and Dr. Gudino  - Ordered:    Activity: ambulate as tolerated, encourage OOB  DVT prophylaxis: SCD s  This plan has been discussed with Dr. Gudino    Patient specific identified risk factors considered as part of today s evaluation include: recurrent diverticulitis, tobacco use     Additional history obtained from patient and chart review.  Time spent on consultation including non face-to-face time: 45 minutes          Mahesh Verdugo PA-C  [facility]  Phone:  [phone]

## 2024-07-30 NOTE — ED NOTES
"[facility]  ED Nurse Handoff Report    ED Chief complaint: Abdominal Pain  . ED Diagnosis:   Final diagnoses:   Diverticulitis of large intestine with abscess without bleeding       Allergies: No Known Allergies    Code Status: Full Code    Activity level - Baseline/Home:  independent.  Activity Level - Current:   independent.   Lift room needed: No.   Bariatric: No   Needed: No   Isolation: No.   Infection: Not Applicable.     Respiratory status: Room air    Vital Signs (within 30 minutes):   Vitals:    07/30/24 0806 07/30/24 0809 07/30/24 1115   BP:  107/72 110/79   Pulse:  84 60   Resp:  16    Temp:  98.5  F (36.9  C)    SpO2:  99% 97%   Weight: 78.6 kg (173 lb 4.5 oz)     Height: 1.651 m (5' 5\")         Cardiac Rhythm:  ,      Pain level:    Patient confused: No.   Patient Falls Risk: nonskid shoes/slippers when out of bed.   Elimination Status: Has voided     Patient Report - Initial Complaint: Abdominal pain.   Focused Assessment: Andressa Lazar is a 43 year old female with history of recurrent diverticulitis, ovarian cyst, and tobacco abuse who presents to the ED for evaluation of abdominal pain. Patient presents with left sided lower abdominal pain for the past few days. Reports a history of 8 episodes of diverticulitis and states today's pain and symptoms feel the same. She has been prescribed antibiotics for previous episodes. She called her Family Medicine nurse line yesterday and was referred to the ED for further work up and evaluation. Andressa notes difficulty with bowel movements and states the diverticulitis flares occur every 3 months. This causes her to miss work and makes it difficult to care for her son. She took ibuprofen yesterday morning with no relief. Denies fever, chills, or myalgias. Of note, patient reports diverticulitis runs in the family on her mother's side but they have had surgical intervention. She has been seen several times at the ED and [facility] " "Room but the pain is \"debilitating.\" She would like a solution as she cannot keep missing work.        Abnormal Results:   Labs Ordered and Resulted from Time of ED Arrival to Time of ED Departure   COMPREHENSIVE METABOLIC PANEL - Abnormal       Result Value    Sodium 140      Potassium 3.9      Carbon Dioxide (CO2) 21 (*)     Anion Gap 13      Urea Nitrogen 7.6      Creatinine 0.65      GFR Estimate >90      Calcium 9.4      Chloride 106      Glucose 126 (*)     Alkaline Phosphatase 68      AST 18      ALT 17      Protein Total 7.4      Albumin 4.4      Bilirubin Total 0.9     HCG QUANTITATIVE PREGNANCY - Normal    hCG Quantitative <1     CBC WITH PLATELETS AND DIFFERENTIAL    WBC Count 7.2      RBC Count 4.99      Hemoglobin 15.4      Hematocrit 43.9      MCV 88      MCH 30.9      MCHC 35.1      RDW 12.5      Platelet Count 175      % Neutrophils 72      % Lymphocytes 20      % Monocytes 6      % Eosinophils 1      % Basophils 0      % Immature Granulocytes 0      NRBCs per 100 WBC 0      Absolute Neutrophils 5.2      Absolute Lymphocytes 1.4      Absolute Monocytes 0.4      Absolute Eosinophils 0.1      Absolute Basophils 0.0      Absolute Immature Granulocytes 0.0      Absolute NRBCs 0.0          CT Abdomen Pelvis w Contrast   Preliminary Result   IMPRESSION:    1.  Acute sigmoid diverticulitis with adjacent severe edema. Two   locules of small fluid measuring 1.1 cm and 1.7 cm near the   diverticulitis compatible with small abscesses. No free air identified   at this time.          Treatments provided: Labs, scans, meds  Family Comments: Updates independently  OBS brochure/video discussed/provided to patient:  Yes  ED Medications:   Medications   sodium chloride 0.9 % bag 100 mL for CT scan flush use (100 mLs As instructed $Given 7/30/24 9467)   piperacillin-tazobactam (ZOSYN) 4.5 g vial to attach to  mL bag (4.5 g Intravenous $New Bag 7/30/24 1111)   ketorolac (TORADOL) injection 15 mg (15 mg Intravenous " $Given 7/30/24 0918)   iopamidol (ISOVUE-370) solution 500 mL (86 mLs Intravenous $Given 7/30/24 0927)       Drips infusing:  No  For the majority of the shift this patient was Green.   Interventions performed were N/A.    Sepsis treatment initiated: No    Cares/treatment/interventions/medications to be completed following ED care: N/A    ED Nurse Name: Bronwyn Nichole RN  11:22 AM     RECEIVING UNIT ED HANDOFF REVIEW    Above ED Nurse Handoff Report was reviewed: Yes  Reviewed by: Melina Trujillo RN on July 30, 2024 at 4:07 PM   SHANIA Morton called the ED to inform them the note was read: Yes

## 2024-07-30 NOTE — PLAN OF CARE
"Shift: 2339-6311.    A&Ox4. Mild pain, no interventions at this time. Ind in room. LS: clear. No tele. NS running at 100 mL/hr. Zosyn q6h. Nicotine patch in place on R shoulder. CRS following. Continue POC.     Goal Outcome Evaluation:      Plan of Care Reviewed With: patient    Overall Patient Progress: improvingOverall Patient Progress: improving    Outcome Evaluation: Pain controlled with Dilaudid. On IV fluids and Zosyn.            Problem: Adult Inpatient Plan of Care  Goal: Plan of Care Review  Description: The Plan of Care Review/Shift note should be completed every shift.  The Outcome Evaluation is a brief statement about your assessment that the patient is improving, declining, or no change.  This information will be displayed automatically on your shift  note.  Outcome: Progressing  Flowsheets (Taken 7/30/2024 1705)  Outcome Evaluation: Pain controlled with Dilaudid. On IV fluids and Zosyn.  Plan of Care Reviewed With: patient  Overall Patient Progress: improving  Goal: Patient-Specific Goal (Individualized)  Description: You can add care plan individualizations to a care plan. Examples of Individualization might be:  \"Parent requests to be called daily at 9am for status\", \"I have a hard time hearing out of my right ear\", or \"Do not touch me to wake me up as it startles  me\".  Outcome: Progressing  Goal: Absence of Hospital-Acquired Illness or Injury  Outcome: Progressing  Intervention: Identify and Manage Fall Risk  Recent Flowsheet Documentation  Taken 7/30/2024 1648 by Melina Trujillo, RN  Safety Promotion/Fall Prevention:   assistive device/personal items within reach   clutter free environment maintained   increased rounding and observation   increase visualization of patient   lighting adjusted   nonskid shoes/slippers when out of bed   patient and family education   safety round/check completed  Intervention: Prevent Skin Injury  Recent Flowsheet Documentation  Taken 7/30/2024 1648 by Ronnie, " Melina MARTINEZ RN  Body Position: position changed independently  Goal: Optimal Comfort and Wellbeing  Outcome: Progressing  Intervention: Monitor Pain and Promote Comfort  Recent Flowsheet Documentation  Taken 7/30/2024 1648 by Melina Trujillo RN  Pain Management Interventions: rest  Goal: Readiness for Transition of Care  Outcome: Progressing  Intervention: Mutually Develop Transition Plan  Recent Flowsheet Documentation  Taken 7/30/2024 1644 by Melina Trujillo RN  Equipment Currently Used at Home: none     Problem: Pain Acute  Goal: Optimal Pain Control and Function  Outcome: Progressing  Intervention: Develop Pain Management Plan  Recent Flowsheet Documentation  Taken 7/30/2024 1648 by Melina Trujillo RN  Pain Management Interventions: rest  Intervention: Prevent or Manage Pain  Recent Flowsheet Documentation  Taken 7/30/2024 1648 by Melina Trujillo RN  Medication Review/Management: medications reviewed     Problem: Intestinal Obstruction  Goal: Fluid and Electrolyte Balance  Outcome: Progressing  Goal: Absence of Infection Signs and Symptoms  Outcome: Progressing  Goal: Optimize Nutrition Status  Outcome: Progressing  Goal: Optimal Pain Control and Function  Outcome: Progressing  Intervention: Prevent or Manage Pain  Recent Flowsheet Documentation  Taken 7/30/2024 1648 by Melina Trujillo RN  Pain Management Interventions: rest

## 2024-07-30 NOTE — PROGRESS NOTES
[facility]    ED Boarding Nurse Handoff Addendum Report:    Date/time: 7/30/2024, 4:13 PM    Activity Level: independent    Fall Risk: No    Active Infusions: NS    Current Meds Due: see MAR    Current care needs: Bowel rest, IVF, IV abx, pain control, colorectal following.      Oxygen requirements (liters/min and/or FiO2): none    Respiratory status: Room air    Vital signs (within last 30 minutes):    Vitals:    07/30/24 1115 07/30/24 1130 07/30/24 1145 07/30/24 1518   BP: 110/79 (!) 113/90 110/64 109/68   Pulse: 60 70 61 50   Resp:    18   Temp:    97.7  F (36.5  C)   TempSrc:    Oral   SpO2: 97% 94% 97% 98%   Weight:       Height:           Focused assessment within last 30 minutes:    Patient A&O x4. Tolerating CL diet. Denies nausea or vomiting. 6/10 abdominal pain, managed with dilaudid.     ED Boarding Nurse name: Sandy Nieves RN

## 2024-07-30 NOTE — PHARMACY-ADMISSION MEDICATION HISTORY
Pharmacist Admission Medication History    Admission medication history is complete. The information provided in this note is only as accurate as the sources available at the time of the update.    Information Source(s): Patient via in-person    Pertinent Information: none.    Changes made to PTA medication list:  Added: mvi, zyrtec, probiotic, xopenex hfa inhaler  Deleted: albuterol inhaler, augmentin, flonase, percocet, prenatal  Changed: none    Allergies reviewed with patient and updates made in EHR: yes (nka_    Medication History Completed By: Jillian Smallwood [facility] 7/30/2024 11:57 AM    PTA Med List   Medication Sig Last Dose    Cetirizine-Pseudoephedrine (ZYRTEC-D PO) Take 1 tablet by mouth daily 24hour Zyrtec* 7/30/2024 at am    levalbuterol (XOPENEX HFA) 45 MCG/ACT inhaler Inhale 2 puffs into the lungs every 4 hours as needed for shortness of breath or wheezing prn    multivitamin, therapeutic (THERA-VIT) TABS tablet Take 1 tablet by mouth daily 7/29/2024    Probiotic Product (PROBIOTIC PO) Take 1 capsule by mouth daily 7/29/2024

## 2024-07-31 LAB
ANION GAP SERPL CALCULATED.3IONS-SCNC: 12 MMOL/L (ref 7–15)
BUN SERPL-MCNC: 5 MG/DL (ref 6–20)
CALCIUM SERPL-MCNC: 8.2 MG/DL (ref 8.8–10.4)
CHLORIDE SERPL-SCNC: 109 MMOL/L (ref 98–107)
CREAT SERPL-MCNC: 0.69 MG/DL (ref 0.51–0.95)
EGFRCR SERPLBLD CKD-EPI 2021: >90 ML/MIN/1.73M2
ERYTHROCYTE [DISTWIDTH] IN BLOOD BY AUTOMATED COUNT: 12.4 % (ref 10–15)
GLUCOSE SERPL-MCNC: 95 MG/DL (ref 70–99)
HCO3 SERPL-SCNC: 20 MMOL/L (ref 22–29)
HCT VFR BLD AUTO: 38.2 % (ref 35–47)
HGB BLD-MCNC: 12.7 G/DL (ref 11.7–15.7)
MCH RBC QN AUTO: 30.2 PG (ref 26.5–33)
MCHC RBC AUTO-ENTMCNC: 33.2 G/DL (ref 31.5–36.5)
MCV RBC AUTO: 91 FL (ref 78–100)
PLATELET # BLD AUTO: 152 10E3/UL (ref 150–450)
POTASSIUM SERPL-SCNC: 4.1 MMOL/L (ref 3.4–5.3)
RBC # BLD AUTO: 4.2 10E6/UL (ref 3.8–5.2)
SODIUM SERPL-SCNC: 141 MMOL/L (ref 135–145)
WBC # BLD AUTO: 4.9 10E3/UL (ref 4–11)

## 2024-07-31 PROCEDURE — 99232 SBSQ HOSP IP/OBS MODERATE 35: CPT | Performed by: INTERNAL MEDICINE

## 2024-07-31 PROCEDURE — 80048 BASIC METABOLIC PNL TOTAL CA: CPT | Performed by: INTERNAL MEDICINE

## 2024-07-31 PROCEDURE — 120N000001 HC R&B MED SURG/OB

## 2024-07-31 PROCEDURE — 36415 COLL VENOUS BLD VENIPUNCTURE: CPT | Performed by: INTERNAL MEDICINE

## 2024-07-31 PROCEDURE — 258N000003 HC RX IP 258 OP 636: Performed by: INTERNAL MEDICINE

## 2024-07-31 PROCEDURE — 250N000013 HC RX MED GY IP 250 OP 250 PS 637: Performed by: INTERNAL MEDICINE

## 2024-07-31 PROCEDURE — 85027 COMPLETE CBC AUTOMATED: CPT | Performed by: INTERNAL MEDICINE

## 2024-07-31 PROCEDURE — 250N000011 HC RX IP 250 OP 636: Performed by: INTERNAL MEDICINE

## 2024-07-31 RX ORDER — SODIUM CHLORIDE 9 MG/ML
INJECTION, SOLUTION INTRAVENOUS CONTINUOUS
Status: ACTIVE | OUTPATIENT
Start: 2024-07-31 | End: 2024-07-31

## 2024-07-31 RX ORDER — CETIRIZINE HYDROCHLORIDE 10 MG/1
10 TABLET ORAL DAILY
Status: DISCONTINUED | OUTPATIENT
Start: 2024-07-31 | End: 2024-08-01 | Stop reason: HOSPADM

## 2024-07-31 RX ADMIN — PIPERACILLIN AND TAZOBACTAM 3.38 G: 3; .375 INJECTION, POWDER, FOR SOLUTION INTRAVENOUS at 11:19

## 2024-07-31 RX ADMIN — PIPERACILLIN AND TAZOBACTAM 3.38 G: 3; .375 INJECTION, POWDER, FOR SOLUTION INTRAVENOUS at 17:30

## 2024-07-31 RX ADMIN — HYDROMORPHONE HYDROCHLORIDE 0.2 MG: 0.2 INJECTION, SOLUTION INTRAMUSCULAR; INTRAVENOUS; SUBCUTANEOUS at 21:23

## 2024-07-31 RX ADMIN — SENNOSIDES AND DOCUSATE SODIUM 2 TABLET: 50; 8.6 TABLET ORAL at 17:19

## 2024-07-31 RX ADMIN — SODIUM CHLORIDE, PRESERVATIVE FREE: 5 INJECTION INTRAVENOUS at 03:26

## 2024-07-31 RX ADMIN — CETIRIZINE HYDROCHLORIDE 10 MG: 10 TABLET, FILM COATED ORAL at 13:03

## 2024-07-31 RX ADMIN — NICOTINE 1 PATCH: 14 PATCH, EXTENDED RELEASE TRANSDERMAL at 08:39

## 2024-07-31 RX ADMIN — PIPERACILLIN AND TAZOBACTAM 3.38 G: 3; .375 INJECTION, POWDER, FOR SOLUTION INTRAVENOUS at 23:46

## 2024-07-31 RX ADMIN — SODIUM CHLORIDE: 9 INJECTION, SOLUTION INTRAVENOUS at 13:03

## 2024-07-31 RX ADMIN — HYDROMORPHONE HYDROCHLORIDE 0.2 MG: 0.2 INJECTION, SOLUTION INTRAMUSCULAR; INTRAVENOUS; SUBCUTANEOUS at 23:50

## 2024-07-31 RX ADMIN — HYDROMORPHONE HYDROCHLORIDE 2 MG: 2 TABLET ORAL at 14:34

## 2024-07-31 RX ADMIN — HYDROMORPHONE HYDROCHLORIDE 2 MG: 2 TABLET ORAL at 04:50

## 2024-07-31 RX ADMIN — HYDROMORPHONE HYDROCHLORIDE 2 MG: 2 TABLET ORAL at 08:37

## 2024-07-31 RX ADMIN — PIPERACILLIN AND TAZOBACTAM 3.38 G: 3; .375 INJECTION, POWDER, FOR SOLUTION INTRAVENOUS at 04:51

## 2024-07-31 ASSESSMENT — ACTIVITIES OF DAILY LIVING (ADL)
ADLS_ACUITY_SCORE: 20

## 2024-07-31 NOTE — PROGRESS NOTES
[facility]    Hospitalist Progress Note  Name: Andressa Lazar    MRN: [id number]  Provider:  Elvis Weiss DO  Date of Service: 07/31/2024    Summary of Stay: Andressa Lazar is a 43 year old female with past medical history of 8 previous episodes of diverticulitis, chronic joint pain, active nicotine dependence with cigarettes who presented on 7/30/2024 with chief complaint of lower abdominal pain.  In the emergency department, the patient was found to have a temperature of 98.5  F, heart rate 84, blood pressure 107/72, respiratory rate 16, SpO2 99% on room air. Initial lab work showed bicarb 21, glucose 126, WBC 7.2. CT abdomen and pelvis showed acute sigmoid diverticulitis with adjacent severe edema and 2 locules of small fluid measuring 1.1 cm and 1.7 cm near the diverticulitis compatible with small abscesses. The patient was made n.p.o., started on IV fluids, provided with pain control. Colorectal surgery was consulted to see the patient.     TODAY'S PLAN:  Appreciate colorectal surgery recommendations.  Advance to clear liquid overnight and plans for full liquid for lunch.  Abdominal pain is about the same as yesterday without changes.  No fevers or leukocytosis overnight.  Continue IV Zosyn today.  Possible discharge home in the next 1 to 2 days with plans for elective resection in the outpatient setting once recovered from this episode.    Problem List:   Acute Intractable Abdominal Pain  Acute Sigmoid Diverticulitis with Two Abscesses  Recurrent Diverticulitis  - Appreciate CRS recommendations  - Clear liquid diet -> ful liquid  - Pain control prn  - Zosyn  - Colonoscopy in 4/2021 showed multiple small mouth diverticula in the sigmoid and descending colon and no other acute abnormalities     Chronic Medical Problems:  Chronic Joint Pain  Active Nicotine Dependence with Cigarettes    I spent 45 minutes in reviewing this patient's labs, imaging, medications, medical history.  In addition  [FreeTextEntry1] : This is a 68 year-old man with a history of brain tumor resection as a child.\par \par He remains on Dilantin and phenobarbital. \par I will continue his current doses.\par \par I will see him back in 6 months. time was spent interviewing the patient, communicating with family, and medical decision making.     DVT Prophylaxis: Pneumatic Compression Devices  Code Status: Full Code  Diet: Full Liquid Diet    Coon Catheter: Not present    Disposition: Medically Ready for Discharge: Anticipated Tomorrow  Goals to discharge include: tolerating oral diet, pain improving with IV abx  Family updated today: No     Interval History   Pt seen and examined.  Pt reports ongoing abd pain similar to yesterday.    -Data reviewed today: I personally reviewed all new labs and imaging results over the last 24 hours.     Physical Exam   Temp: 98  F (36.7  C) Temp src: Oral BP: 117/71 Pulse: (!) 48   Resp: 16 SpO2: 98 % O2 Device: None (Room air)    Vitals:    07/30/24 0806   Weight: 78.6 kg (173 lb 4.5 oz)     Vital Signs with Ranges  Temp:  [97.7  F (36.5  C)-98  F (36.7  C)] 98  F (36.7  C)  Pulse:  [48-70] 48  Resp:  [16-18] 16  BP: ()/(47-90) 117/71  SpO2:  [94 %-98 %] 98 %  I/O last 3 completed shifts:  In: 420 [P.O.:420]  Out: -     GENERAL: No apparent distress. Awake, alert, and fully oriented.  HEENT: Normocephalic, atraumatic. Extraocular movements intact.  CARDIOVASCULAR: Regular rate and rhythm without murmurs or rubs. No S3.  PULMONARY: Clear bilaterally.  GASTROINTESTINAL: Soft, non-tender, non-distended. Bowel sounds normoactive.   EXTREMITIES: No cyanosis or clubbing. No edema.  NEUROLOGICAL: CN 2-12 grossly intact, no focal neurological deficits.  DERMATOLOGICAL: No rash, ulcer, bruising, nor jaundice.    Medications   Current Facility-Administered Medications   Medication Dose Route Frequency Provider Last Rate Last Admin    sodium chloride 0.9 % infusion   Intravenous Continuous Elvis Weiss,  100 mL/hr at 07/31/24 0326 New Bag at 07/31/24 0326     Current Facility-Administered Medications   Medication Dose Route Frequency Provider Last Rate Last Admin    nicotine (NICODERM CQ) 14 MG/24HR 24 hr patch 1  "patch  1 patch Transdermal Daily Elvis Weiss DO   1 patch at 07/31/24 0839    piperacillin-tazobactam (ZOSYN) 3.375 g vial to attach to  mL bag  3.375 g Intravenous Q6H Elvis Weiss DO   3.375 g at 07/31/24 0451    sodium chloride (PF) 0.9% PF flush 3 mL  3 mL Intracatheter Q8H Elvis Weiss DO   3 mL at 07/30/24 1143     Data     Laboratory:  Recent Labs   Lab 07/31/24  0658 07/30/24  0818   WBC 4.9 7.2   HGB 12.7 15.4   HCT 38.2 43.9   MCV 91 88    175     Recent Labs   Lab 07/31/24  0658 07/30/24  0818    140   POTASSIUM 4.1 3.9   CHLORIDE 109* 106   CO2 20* 21*   ANIONGAP 12 13   GLC 95 126*   BUN 5.0* 7.6   CR 0.69 0.65   GFRESTIMATED >90 >90   NATALY 8.2* 9.4     No results for input(s): \"CULT\" in the last 168 hours.  No results found for: \"TROPI\"    Imaging:  No results found for this or any previous visit (from the past 24 hour(s)).      Elvis Weiss DO  [facility] Hospitalist  [address] [place] [address]  Securely message with [employer] (more info)  Text page via [employer] Paging/Directory   07/31/2024   "

## 2024-07-31 NOTE — PROGRESS NOTES
"Colon & Rectal Surgery Progress Note             Interval History:     HD #2   Feels about the same. Tolerating clears, hungry.                   Medications:   I have reviewed this patient's current medications               Physical Exam:   Blood pressure 111/79, pulse 50, temperature 98  F (36.7  C), temperature source Oral, resp. rate 16, height 1.651 m (5' 5\"), weight 78.6 kg (173 lb 4.5 oz), SpO2 96%, not currently breastfeeding.    Intake/Output Summary (Last 24 hours) at 7/31/2024 0554  Last data filed at 7/30/2024 1808  Gross per 24 hour   Intake 420 ml   Output --   Net 420 ml     GEN:  alert  ABD:  soft with mild LLQ tenderness         Data:        Lab Results   Component Value Date     07/30/2024     12/27/2020    Lab Results   Component Value Date    CHLORIDE 106 07/30/2024    CHLORIDE 109 12/27/2020    Lab Results   Component Value Date    BUN 7.6 07/30/2024    BUN 8 12/27/2020      Lab Results   Component Value Date    POTASSIUM 3.9 07/30/2024    POTASSIUM 4.7 12/27/2020    Lab Results   Component Value Date    CO2 21 07/30/2024    CO2 25 12/27/2020    Lab Results   Component Value Date    CR 0.65 07/30/2024    CR 0.52 12/27/2020    CR 0.70 09/28/2019        Lab Results   Component Value Date    HGB 15.4 07/30/2024    HGB 15.0 12/27/2020     Lab Results   Component Value Date     07/30/2024     12/27/2020     Lab Results   Component Value Date    WBC 7.2 07/30/2024    WBC 11.2 (H) 12/27/2020            Assessment and Plan:     Clears for breakfast and advance to fulls later.   Encourage ambulation  Minimize narcotics.  Continue IV abx for now.   No plans for surgery urgently but would likely benefit from elective resection given her frequent recurrent attacks.   Smoking cessation would be beneficial.          Clinically Significant Risk Factors Present on Admission                          # Overweight: Estimated body mass index is 28.84 kg/m  as calculated from the " "following:    Height as of this encounter: 1.651 m (5' 5\").    Weight as of this encounter: 78.6 kg (173 lb 4.5 oz).                Lisa Voss MD  [facility]  [address], [address]  [place] [address]  T: [phone]  F: [phone]   [email or web address]          "

## 2024-07-31 NOTE — PLAN OF CARE
"Goal Outcome Evaluation:    Problem: Adult Inpatient Plan of Care  Goal: Plan of Care Review  Description: The Plan of Care Review/Shift note should be completed every shift.  The Outcome Evaluation is a brief statement about your assessment that the patient is improving, declining, or no change.  This information will be displayed automatically on your shift  note.  Outcome: Progressing  Flowsheets (Taken 7/31/2024 1510)  Outcome Evaluation: tolerating full liquids  Plan of Care Reviewed With: patient  Overall Patient Progress: improving  Goal: Patient-Specific Goal (Individualized)  Description: You can add care plan individualizations to a care plan. Examples of Individualization might be:  \"Parent requests to be called daily at 9am for status\", \"I have a hard time hearing out of my right ear\", or \"Do not touch me to wake me up as it startles  me\".  Outcome: Progressing  Goal: Absence of Hospital-Acquired Illness or Injury  Outcome: Progressing  Goal: Optimal Comfort and Wellbeing  Outcome: ProgressingTo Do:  End of Shift Summary  For vital signs and complete assessments, please see documentation flowsheets.     Pertinent assessments:   Major Shift Events A&o---- up in room and halls ---medicated for pain as requested --- tolerating full liquids -----  denies nausea   Treatment Plan: uneventful  Bedside Nurse: Mariela Conway RN  Intervention: Monitor Pain and Promote Comfort  Recent Flowsheet Documentation  Taken 7/31/2024 0837 by Mariela Conway RN  Pain Management Interventions: medication (see MAR)  Goal: Readiness for Transition of Care  Outcome: Progressing     Problem: Pain Acute  Goal: Optimal Pain Control and Function  Outcome: Progressing  Intervention: Develop Pain Management Plan  Recent Flowsheet Documentation  Taken 7/31/2024 0837 by Mariela Conway RN  Pain Management Interventions: medication (see MAR)     Problem: Intestinal Obstruction  Goal: Fluid and Electrolyte Balance  Outcome: Progressing  Goal: " "Absence of Infection Signs and Symptoms  Outcome: Progressing  Goal: Optimize Nutrition Status  Outcome: Progressing  Goal: Optimal Pain Control and Function  Outcome: Progressing  Intervention: Prevent or Manage Pain  Recent Flowsheet Documentation  Taken 7/31/2024 0837 by Mariela Conway RN  Pain Management Interventions: medication (see MAR)     Problem: Adult Inpatient Plan of Care  Goal: Plan of Care Review  Description: The Plan of Care Review/Shift note should be completed every shift.  The Outcome Evaluation is a brief statement about your assessment that the patient is improving, declining, or no change.  This information will be displayed automatically on your shift  note.  Outcome: Progressing  Flowsheets (Taken 7/31/2024 1510)  Outcome Evaluation: tolerating full liquids  Plan of Care Reviewed With: patient  Overall Patient Progress: improving  Goal: Patient-Specific Goal (Individualized)  Description: You can add care plan individualizations to a care plan. Examples of Individualization might be:  \"Parent requests to be called daily at 9am for status\", \"I have a hard time hearing out of my right ear\", or \"Do not touch me to wake me up as it startles  me\".  Outcome: Progressing  Goal: Absence of Hospital-Acquired Illness or Injury  Outcome: Progressing  Goal: Optimal Comfort and Wellbeing  Outcome: Progressing  Intervention: Monitor Pain and Promote Comfort  Recent Flowsheet Documentation  Taken 7/31/2024 0837 by Mariela Conway RN  Pain Management Interventions: medication (see MAR)  Goal: Readiness for Transition of Care  Outcome: Progressing     Problem: Pain Acute  Goal: Optimal Pain Control and Function  Outcome: Progressing  Intervention: Develop Pain Management Plan  Recent Flowsheet Documentation  Taken 7/31/2024 0837 by Mariela Conway RN  Pain Management Interventions: medication (see MAR)     Problem: Intestinal Obstruction  Goal: Fluid and Electrolyte Balance  Outcome: Progressing  Goal: Absence " of Infection Signs and Symptoms  Outcome: Progressing  Goal: Optimize Nutrition Status  Outcome: Progressing  Goal: Optimal Pain Control and Function  Outcome: Progressing  Intervention: Prevent or Manage Pain  Recent Flowsheet Documentation  Taken 7/31/2024 4117 by Mariela Conway RN  Pain Management Interventions: medication (see MAR)       Plan of Care Reviewed With: patient    Overall Patient Progress: improvingOverall Patient Progress: improving    Outcome Evaluation: tolerating full liquids

## 2024-07-31 NOTE — PLAN OF CARE
"  Problem: Adult Inpatient Plan of Care  Goal: Plan of Care Review  Description: The Plan of Care Review/Shift note should be completed every shift.  The Outcome Evaluation is a brief statement about your assessment that the patient is improving, declining, or no change.  This information will be displayed automatically on your shift  note.  Outcome: Progressing  Goal: Patient-Specific Goal (Individualized)  Description: You can add care plan individualizations to a care plan. Examples of Individualization might be:  \"Parent requests to be called daily at 9am for status\", \"I have a hard time hearing out of my right ear\", or \"Do not touch me to wake me up as it startles  me\".  Outcome: Progressing  Goal: Absence of Hospital-Acquired Illness or Injury  Outcome: Progressing  Goal: Optimal Comfort and Wellbeing  Outcome: Progressing  Goal: Readiness for Transition of Care  Outcome: Progressing       "

## 2024-07-31 NOTE — PLAN OF CARE
"Goal Outcome Evaluation:      Plan of Care Reviewed With: patient    Overall Patient Progress: improvingOverall Patient Progress: improving    Outcome Evaluation: Dilaudid po effective for pain management.  Pt is A & O x 4, ind, Dilaudid po effective for pain management, CLD, ns @ 100 ml/hr, Zosyn abx, CRS following.    Problem: Adult Inpatient Plan of Care  Goal: Plan of Care Review  Description: The Plan of Care Review/Shift note should be completed every shift.  The Outcome Evaluation is a brief statement about your assessment that the patient is improving, declining, or no change.  This information will be displayed automatically on your shift  note.  Outcome: Progressing  Flowsheets (Taken 7/31/2024 0120)  Outcome Evaluation: Dilaudid po effective for pain management.  Plan of Care Reviewed With: patient  Overall Patient Progress: improving  Goal: Patient-Specific Goal (Individualized)  Description: You can add care plan individualizations to a care plan. Examples of Individualization might be:  \"Parent requests to be called daily at 9am for status\", \"I have a hard time hearing out of my right ear\", or \"Do not touch me to wake me up as it startles  me\".  Outcome: Progressing  Goal: Absence of Hospital-Acquired Illness or Injury  Outcome: Progressing  Intervention: Identify and Manage Fall Risk  Recent Flowsheet Documentation  Taken 7/30/2024 2043 by Doc Hernandez, RN  Safety Promotion/Fall Prevention:   assistive device/personal items within reach   clutter free environment maintained   increased rounding and observation   increase visualization of patient   lighting adjusted   nonskid shoes/slippers when out of bed   patient and family education   safety round/check completed  Intervention: Prevent Skin Injury  Recent Flowsheet Documentation  Taken 7/30/2024 2043 by Doc Hernandez, RN  Body Position: position changed independently  Goal: Optimal Comfort and Wellbeing  Outcome: Progressing  Goal: Readiness for " Transition of Care  Outcome: Progressing     Problem: Pain Acute  Goal: Optimal Pain Control and Function  Outcome: Progressing  Intervention: Prevent or Manage Pain  Recent Flowsheet Documentation  Taken 7/30/2024 2043 by Doc Hernandez, RN  Medication Review/Management: medications reviewed     Problem: Intestinal Obstruction  Goal: Fluid and Electrolyte Balance  Outcome: Progressing  Goal: Absence of Infection Signs and Symptoms  Outcome: Progressing  Goal: Optimize Nutrition Status  Outcome: Progressing  Goal: Optimal Pain Control and Function  Outcome: Progressing

## 2024-08-01 VITALS
HEIGHT: 65 IN | DIASTOLIC BLOOD PRESSURE: 80 MMHG | TEMPERATURE: 98 F | SYSTOLIC BLOOD PRESSURE: 116 MMHG | RESPIRATION RATE: 18 BRPM | BODY MASS INDEX: 28.97 KG/M2 | HEART RATE: 52 BPM | WEIGHT: 173.9 LBS | OXYGEN SATURATION: 99 %

## 2024-08-01 LAB
ANION GAP SERPL CALCULATED.3IONS-SCNC: 12 MMOL/L (ref 7–15)
BUN SERPL-MCNC: 6.5 MG/DL (ref 6–20)
CALCIUM SERPL-MCNC: 9 MG/DL (ref 8.8–10.4)
CHLORIDE SERPL-SCNC: 106 MMOL/L (ref 98–107)
CREAT SERPL-MCNC: 0.81 MG/DL (ref 0.51–0.95)
EGFRCR SERPLBLD CKD-EPI 2021: >90 ML/MIN/1.73M2
ERYTHROCYTE [DISTWIDTH] IN BLOOD BY AUTOMATED COUNT: 12.2 % (ref 10–15)
GLUCOSE SERPL-MCNC: 98 MG/DL (ref 70–99)
HCO3 SERPL-SCNC: 21 MMOL/L (ref 22–29)
HCT VFR BLD AUTO: 39.1 % (ref 35–47)
HGB BLD-MCNC: 13.7 G/DL (ref 11.7–15.7)
MCH RBC QN AUTO: 31.1 PG (ref 26.5–33)
MCHC RBC AUTO-ENTMCNC: 35 G/DL (ref 31.5–36.5)
MCV RBC AUTO: 89 FL (ref 78–100)
PLATELET # BLD AUTO: 166 10E3/UL (ref 150–450)
POTASSIUM SERPL-SCNC: 4 MMOL/L (ref 3.4–5.3)
RBC # BLD AUTO: 4.4 10E6/UL (ref 3.8–5.2)
SODIUM SERPL-SCNC: 139 MMOL/L (ref 135–145)
WBC # BLD AUTO: 5.7 10E3/UL (ref 4–11)

## 2024-08-01 PROCEDURE — 36415 COLL VENOUS BLD VENIPUNCTURE: CPT | Performed by: INTERNAL MEDICINE

## 2024-08-01 PROCEDURE — 99239 HOSP IP/OBS DSCHRG MGMT >30: CPT | Performed by: INTERNAL MEDICINE

## 2024-08-01 PROCEDURE — 85027 COMPLETE CBC AUTOMATED: CPT | Performed by: INTERNAL MEDICINE

## 2024-08-01 PROCEDURE — 80048 BASIC METABOLIC PNL TOTAL CA: CPT | Performed by: INTERNAL MEDICINE

## 2024-08-01 PROCEDURE — 250N000011 HC RX IP 250 OP 636: Performed by: INTERNAL MEDICINE

## 2024-08-01 PROCEDURE — 250N000013 HC RX MED GY IP 250 OP 250 PS 637: Performed by: INTERNAL MEDICINE

## 2024-08-01 RX ORDER — HYDROMORPHONE HYDROCHLORIDE 2 MG/1
2 TABLET ORAL EVERY 6 HOURS PRN
Qty: 10 TABLET | Refills: 0 | Status: SHIPPED | OUTPATIENT
Start: 2024-08-01 | End: 2024-08-04

## 2024-08-01 RX ADMIN — HYDROMORPHONE HYDROCHLORIDE 2 MG: 2 TABLET ORAL at 06:56

## 2024-08-01 RX ADMIN — AMOXICILLIN AND CLAVULANATE POTASSIUM 1 TABLET: 875; 125 TABLET, FILM COATED ORAL at 12:43

## 2024-08-01 RX ADMIN — NICOTINE 1 PATCH: 14 PATCH, EXTENDED RELEASE TRANSDERMAL at 08:51

## 2024-08-01 RX ADMIN — CETIRIZINE HYDROCHLORIDE 10 MG: 10 TABLET, FILM COATED ORAL at 08:51

## 2024-08-01 RX ADMIN — PIPERACILLIN AND TAZOBACTAM 3.38 G: 3; .375 INJECTION, POWDER, FOR SOLUTION INTRAVENOUS at 06:03

## 2024-08-01 ASSESSMENT — ACTIVITIES OF DAILY LIVING (ADL)
ADLS_ACUITY_SCORE: 20

## 2024-08-01 NOTE — PLAN OF CARE
"Goal Outcome Evaluation:      Plan of Care Reviewed With: patient    Overall Patient Progress: improvingOverall Patient Progress: improving    Outcome Evaluation: A/OX4,Continues on Zosyn,iv dilauidid for pain,full liquid diet, possiblke discharge to home today.      Problem: Adult Inpatient Plan of Care  Goal: Plan of Care Review  Description: The Plan of Care Review/Shift note should be completed every shift.  The Outcome Evaluation is a brief statement about your assessment that the patient is improving, declining, or no change.  This information will be displayed automatically on your shift  note.  Outcome: Progressing  Flowsheets (Taken 8/1/2024 0520)  Outcome Evaluation: A/OX4,Continues on Zosyn,iv dilauidid for pain,full liquid diet, possiblke discharge to home today.  Plan of Care Reviewed With: patient  Overall Patient Progress: improving  Goal: Patient-Specific Goal (Individualized)  Description: You can add care plan individualizations to a care plan. Examples of Individualization might be:  \"Parent requests to be called daily at 9am for status\", \"I have a hard time hearing out of my right ear\", or \"Do not touch me to wake me up as it startles  me\".  Outcome: Progressing  Goal: Absence of Hospital-Acquired Illness or Injury  Outcome: Progressing  Intervention: Prevent Skin Injury  Recent Flowsheet Documentation  Taken 7/31/2024 2100 by Valdo Coet, RN  Body Position: position changed independently  Goal: Optimal Comfort and Wellbeing  Outcome: Progressing  Goal: Readiness for Transition of Care  Outcome: Progressing     Problem: Pain Acute  Goal: Optimal Pain Control and Function  Outcome: Progressing     Problem: Intestinal Obstruction  Goal: Fluid and Electrolyte Balance  Outcome: Progressing  Goal: Absence of Infection Signs and Symptoms  Outcome: Progressing  Goal: Optimize Nutrition Status  Outcome: Progressing  Goal: Optimal Pain Control and Function  Outcome: Progressing       "

## 2024-08-01 NOTE — PROGRESS NOTES
COLON & RECTAL SURGERY  PROGRESS NOTE    August 1, 2024    SUBJECTIVE:  Reports feeling the same. Used PO Dilaudid x1 this morning. Used IV Dilaudid x2. Tolerating fulls, no n/v. Had a loose BM this morning. No blood. AVSS. WBC normal.    OBJECTIVE:  Temp:  [98  F (36.7  C)-98.5  F (36.9  C)] 98  F (36.7  C)  Pulse:  [45-52] 52  Resp:  [16-18] 18  BP: (116-130)/(51-80) 116/80  SpO2:  [98 %-99 %] 99 %    Intake/Output Summary (Last 24 hours) at 8/1/2024 0851  Last data filed at 7/31/2024 0900  Gross per 24 hour   Intake 240 ml   Output --   Net 240 ml       GENERAL:  Awake, alert, no acute distress, lying in bed  HEAD: Nomocephalic atraumatic  SCLERA: anicteric  EXTREMITIES: warm and well perfused  ABDOMEN:  Soft, minimally tender, non-distended, no rebound or guarding, no peritoneal signs.    LABS:  Lab Results   Component Value Date    WBC 5.7 08/01/2024    WBC 11.2 12/27/2020     Lab Results   Component Value Date    HGB 13.7 08/01/2024    HGB 15.0 12/27/2020     Lab Results   Component Value Date    HCT 39.1 08/01/2024    HCT 44.2 12/27/2020     Lab Results   Component Value Date     08/01/2024     12/27/2020     Last Basic Metabolic Panel:  Lab Results   Component Value Date     08/01/2024     12/27/2020      Lab Results   Component Value Date    POTASSIUM 4.0 08/01/2024    POTASSIUM 4.7 12/27/2020     Lab Results   Component Value Date    CHLORIDE 106 08/01/2024    CHLORIDE 109 12/27/2020     Lab Results   Component Value Date    NATALY 9.0 08/01/2024    NATALY 8.6 12/27/2020     Lab Results   Component Value Date    CO2 21 08/01/2024    CO2 25 12/27/2020     Lab Results   Component Value Date    BUN 6.5 08/01/2024    BUN 8 12/27/2020     Lab Results   Component Value Date    CR 0.81 08/01/2024    CR 0.52 12/27/2020     Lab Results   Component Value Date    GLC 98 08/01/2024    GLC 84 12/27/2020       ASSESSMENT/PLAN: Andressa is a 43 year old woman admitted with diverticulitis with an  "abscess. She has a history of frequent and recurrent attacks.     - Low fiber diet  - Continue antibiotics.   - Pain management as needed, minimize narcotics.  - Encourage ambulation  - No plans for surgery  - Okay to discharge from a CRS perspective if tolerates LFD and pain controlled.   - Will have her follow up in clinic for further discussion of elective surgery    For questions/paging, please contact the CRS office at [phone].    Clinically Significant Risk Factors                             # Overweight: Estimated body mass index is 28.94 kg/m  as calculated from the following:    Height as of this encounter: 1.651 m (5' 5\").    Weight as of this encounter: 78.9 kg (173 lb 14.4 oz)., PRESENT ON ADMISSION          Sharmila Haskins PA-C  [facility]  Phone: [phone]    "

## 2024-08-01 NOTE — DISCHARGE SUMMARY
Hospitalist Discharge Summary  [facility]    Andressa Lazar MRN# [id number]   YOB: 1980 Age: 43 year old     Date of Admission:  7/30/2024  Date of Discharge:  8/1/2024  Admitting Physician:  Elvis Weiss DO  Discharge Physician:  Elvis Weiss DO  Discharging Service:  Hospitalist     Primary Provider: Clinic, [facility]          Discharge Diagnosis:     Acute Intractable Abdominal Pain  Acute Sigmoid Diverticulitis with Two Abscesses  Recurrent Diverticulitis  - Appreciate CRS recommendations  - Clear liquid diet -> ful liquid -> low fiber  - Pain control prn  - Zosyn  - Colonoscopy in 4/2021 showed multiple small mouth diverticula in the sigmoid and descending colon and no other acute abnormalities     Chronic Medical Problems:  Chronic Joint Pain  Active Nicotine Dependence with Cigarettes             Discharge Disposition:     Discharged to home           Hospital Course:     Andressa Lazar is a 43 year old female with past medical history of 8 previous episodes of diverticulitis, chronic joint pain, active nicotine dependence with cigarettes who presented on 7/30/2024 with chief complaint of lower abdominal pain.  In the emergency department, the patient was found to have a temperature of 98.5  F, heart rate 84, blood pressure 107/72, respiratory rate 16, SpO2 99% on room air. Initial lab work showed bicarb 21, glucose 126, WBC 7.2. CT abdomen and pelvis showed acute sigmoid diverticulitis with adjacent severe edema and 2 locules of small fluid measuring 1.1 cm and 1.7 cm near the diverticulitis compatible with small abscesses. The patient was made n.p.o., started on IV fluids, provided with pain control. Colorectal surgery was consulted to see the patient.  The patient's diet was advanced.  On 8/1/2024, the patient was transitioned to oral Augmentin and discharged home to follow-up with colorectal surgery as an outpatient for consideration for  "elective partial colectomy.    The patient was seen, examined, and counseled on this day. The hospitalization and plan of care was reviewed with the patient extensively. All questions were addressed and the patient agreed to follow-up as noted above.           Allergies:     No Known Allergies           Discharge Medications:     Current Discharge Medication List        START taking these medications    Details   amoxicillin-clavulanate (AUGMENTIN) 875-125 MG tablet Take 1 tablet by mouth 2 times daily for 14 days  Qty: 28 tablet, Refills: 0    Associated Diagnoses: Diverticulitis of large intestine with abscess without bleeding      HYDROmorphone (DILAUDID) 2 MG tablet Take 1 tablet (2 mg) by mouth every 6 hours as needed for pain  Qty: 10 tablet, Refills: 0    Associated Diagnoses: Diverticulitis of large intestine with abscess without bleeding           CONTINUE these medications which have NOT CHANGED    Details   Cetirizine-Pseudoephedrine (ZYRTEC-D PO) Take 1 tablet by mouth daily 24hour Zyrtec*      levalbuterol (XOPENEX HFA) 45 MCG/ACT inhaler Inhale 2 puffs into the lungs every 4 hours as needed for shortness of breath or wheezing      multivitamin, therapeutic (THERA-VIT) TABS tablet Take 1 tablet by mouth daily      Probiotic Product (PROBIOTIC PO) Take 1 capsule by mouth daily                    Condition on Discharge:     Discharge condition: Fair   Discharge vitals: Blood pressure 116/80, pulse 52, temperature 98  F (36.7  C), temperature source Oral, resp. rate 18, height 1.651 m (5' 5\"), weight 78.9 kg (173 lb 14.4 oz), SpO2 99%, not currently breastfeeding.   Code status on discharge: Full Code      BASIC PHYSICAL EXAMINATION:  GENERAL: No apparent distress.  CARDIOVASCULAR: Regular rate and rhythm without murmurs.  PULMONARY: Clear to auscultation bilaterally.   GASTROINTESTINAL: Abdomen soft, non-tender.  EXTREMITIES: No edema, pulses intact.  NEUROLOGIC: No focal deficits.            History of " Illness:   See detailed admission note for full details.               Procedures excluding imaging which is summarized below:     Please see details in the electronic medical record.           Consultations:     COLORECTAL SURGERY IP CONSULT          Significant Results:     Results for orders placed or performed during the hospital encounter of 07/30/24   CT Abdomen Pelvis w Contrast    Narrative    CT ABDOMEN AND PELVIS WITH CONTRAST 7/30/2024 9:36 AM    CLINICAL HISTORY: LLQ abdominal pain, mult hx diverticulitis.    TECHNIQUE: CT scan of the abdomen and pelvis was performed following  injection of IV contrast. Multiplanar reformats were obtained. Dose  reduction techniques were used.    CONTRAST: 86mL Isovue-370    COMPARISON: CT 12/27/2020.    FINDINGS:   LOWER CHEST: Calcified granuloma at the left lung base. Groundglass  opacities at the right lung base.    HEPATOBILIARY: Normal.    PANCREAS: Normal.    SPLEEN: Normal.    ADRENAL GLANDS: Normal.    KIDNEYS/BLADDER: No hydronephrosis or urolithiasis. Small cyst lower  left kidney without specific imaging follow-up recommended. Mild wall  thickening of the bladder may relate to limited distention.    BOWEL: Acute diverticulitis at the sigmoid colon with wall thickening  and adjacent severe inflammatory edema. Small locules of fluid  posterior to the sigmoid colon measuring 1.7 cm and 1.1 cm series 3  image 156 with some mild peripheral enhancement. No visible free air.  Normal appendix. No bowel obstruction.    PELVIC ORGANS: See above bowel section. IUD within the central uterus.    ADDITIONAL FINDINGS: None.    MUSCULOSKELETAL: Unremarkable.      Impression    IMPRESSION:   1.  Acute sigmoid diverticulitis with adjacent severe edema. Two  locules of small fluid measuring 1.1 cm and 1.7 cm near the  diverticulitis compatible with small abscesses. No free air identified  at this time.    CATRINA NICHOLS MD         SYSTEM ID:  [id number]       Transthoracic  Echocardiogram Results:  No results found for this or any previous visit (from the past 4320 hour(s)).             Pending Results:     Unresulted Labs Ordered in the Past 30 Days of this Admission       No orders found from 6/30/2024 to 7/31/2024.                        Discharge Instructions and Follow-Up:     Discharge instructions and follow-up:   Discharge Procedure Orders   Reason for your hospital stay   Order Comments: Acute Diverticulitis with Abscesses     Follow-up and recommended labs and tests    Order Comments: Follow up with primary care provider, [facility], within 7 days for hospital follow- up.  No follow up labs or test are needed.    Recommend you follow up with Colorectal Surgery in a few weeks regarding the possibility of elective surgery to prevent further episodes of diverticulitis.     Activity   Order Comments: Your activity upon discharge: activity as tolerated     Order Specific Question Answer Comments   Is discharge order? Yes      Diet   Order Comments: Follow this diet upon discharge: Orders Placed This Encounter      Low Fiber Diet     Order Specific Question Answer Comments   Is discharge order? Yes           Total time spent in face to face contact with the patient and coordinating discharge was:  35 Minutes    Elvis Weiss DO  [facility] Hospitalist  [address] [place] [address]  08/01/2024

## 2024-08-01 NOTE — DISCHARGE SUMMARY
Discharge Note    Patient discharged to home via private vehicle  accompanied by other   .  IV: Discontinued  Prescriptions filled and given to patient/family.   Belongings reviewed and sent with patient.   Home medications returned to patient: NA  Equipment sent with: patient.   patient verbalizes understanding of discharge instructions. AVS given to patient.  Additional education completed?  Plan to follow up with CRS after discharge for diverticulitis symptoms.

## 2024-08-01 NOTE — PROGRESS NOTES
[facility]    Hospitalist Progress Note  Name: Andressa Lazar    MRN: [id number]  Provider:  Elvis Weiss DO  Date of Service: 08/01/2024    Summary of Stay: Andressa Lazar is a 43 year old female with past medical history of 8 previous episodes of diverticulitis, chronic joint pain, active nicotine dependence with cigarettes who presented on 7/30/2024 with chief complaint of lower abdominal pain.  In the emergency department, the patient was found to have a temperature of 98.5  F, heart rate 84, blood pressure 107/72, respiratory rate 16, SpO2 99% on room air. Initial lab work showed bicarb 21, glucose 126, WBC 7.2. CT abdomen and pelvis showed acute sigmoid diverticulitis with adjacent severe edema and 2 locules of small fluid measuring 1.1 cm and 1.7 cm near the diverticulitis compatible with small abscesses. The patient was made n.p.o., started on IV fluids, provided with pain control. Colorectal surgery was consulted to see the patient.  The patient's diet was advanced.    TODAY'S PLAN: Appreciate colorectal surgery recommendations.  Await colorectal surgeon evaluation.  Patient still with some abdominal pain and did require IV Dilaudid last night.  Overall, no evidence of fevers or leukocytosis and seems to be tolerating a diet.  Hopeful for discharge home today with 14 days of oral Augmentin and pain medication with plans for outpatient colorectal surgery follow-up for elective surgery.  Ultimately, we will wait for colorectal surgery final recommendations.  ADDENDUM:  Transition to augmentin as pt lost IV access.    Problem List:   Acute Intractable Abdominal Pain  Acute Sigmoid Diverticulitis with Two Abscesses  Recurrent Diverticulitis  - Appreciate CRS recommendations  - Clear liquid diet -> ful liquid -> low fiber  - Pain control prn  - Zosyn  - Colonoscopy in 4/2021 showed multiple small mouth diverticula in the sigmoid and descending colon and no other acute abnormalities      Chronic Medical Problems:  Chronic Joint Pain  Active Nicotine Dependence with Cigarettes    I spent 45 minutes in reviewing this patient's labs, imaging, medications, medical history.  In addition time was spent interviewing the patient, communicating with family, and medical decision making.  Time was also spent reviewing notes of colorectal surgery.    DVT Prophylaxis: Pneumatic Compression Devices  Code Status: Full Code  Diet: Low Fiber Diet    Coon Catheter: Not present    Disposition: Medically Ready for Discharge: Anticipated Today  Goals to discharge include: cleared for discharge by CRS  Family updated today: No     Interval History   Pt seen and examined.  Pt reports still some mild abd pain.    -Data reviewed today: I personally reviewed all new labs and imaging results over the last 24 hours.     Physical Exam   Temp: 98  F (36.7  C) Temp src: Oral BP: 116/80 Pulse: 52   Resp: 18 SpO2: 99 % O2 Device: None (Room air)    Vitals:    07/30/24 0806 08/01/24 0607   Weight: 78.6 kg (173 lb 4.5 oz) 78.9 kg (173 lb 14.4 oz)     Vital Signs with Ranges  Temp:  [98  F (36.7  C)-98.5  F (36.9  C)] 98  F (36.7  C)  Pulse:  [45-52] 52  Resp:  [16-18] 18  BP: (116-130)/(51-80) 116/80  SpO2:  [98 %-99 %] 99 %  I/O last 3 completed shifts:  In: 240 [P.O.:240]  Out: -     GENERAL: No apparent distress. Awake, alert, and fully oriented.  HEENT: Normocephalic, atraumatic. Extraocular movements intact.  CARDIOVASCULAR: Regular rate and rhythm without murmurs or rubs. No S3.  PULMONARY: Clear bilaterally.  GASTROINTESTINAL: Soft, non-tender, non-distended. Bowel sounds normoactive.   EXTREMITIES: No cyanosis or clubbing. No edema.  NEUROLOGICAL: CN 2-12 grossly intact, no focal neurological deficits.  DERMATOLOGICAL: No rash, ulcer, bruising, nor jaundice.    Medications   Current Facility-Administered Medications   Medication Dose Route Frequency Provider Last Rate Last Admin     Current Facility-Administered  "Medications   Medication Dose Route Frequency Provider Last Rate Last Admin    cetirizine (zyrTEC) tablet 10 mg  10 mg Oral Daily Elvis Weiss DO   10 mg at 08/01/24 0851    nicotine (NICODERM CQ) 14 MG/24HR 24 hr patch 1 patch  1 patch Transdermal Daily Elvis Weiss DO   1 patch at 08/01/24 0851    piperacillin-tazobactam (ZOSYN) 3.375 g vial to attach to  mL bag  3.375 g Intravenous Q6H Elvis Weiss DO   3.375 g at 08/01/24 0603    sodium chloride (PF) 0.9% PF flush 3 mL  3 mL Intracatheter Q8H Elvis Weiss DO   3 mL at 08/01/24 0603     Data     Laboratory:  Recent Labs   Lab 08/01/24  0725 07/31/24  0658 07/30/24  0818   WBC 5.7 4.9 7.2   HGB 13.7 12.7 15.4   HCT 39.1 38.2 43.9   MCV 89 91 88    152 175     Recent Labs   Lab 08/01/24  0725 07/31/24  0658 07/30/24  0818    141 140   POTASSIUM 4.0 4.1 3.9   CHLORIDE 106 109* 106   CO2 21* 20* 21*   ANIONGAP 12 12 13   GLC 98 95 126*   BUN 6.5 5.0* 7.6   CR 0.81 0.69 0.65   GFRESTIMATED >90 >90 >90   NATALY 9.0 8.2* 9.4     No results for input(s): \"CULT\" in the last 168 hours.  No results found for: \"TROPI\"    Imaging:  No results found for this or any previous visit (from the past 24 hour(s)).      Elvis Weiss DO  [facility] hospital Hospitalist  [address] [place] [address]  Securely message with [employer] (more info)  Text page via [employer] Paging/Directory   08/01/2024   "

## 2024-08-04 ENCOUNTER — HEALTH MAINTENANCE LETTER (OUTPATIENT)
Age: 44
End: 2024-08-04

## 2024-09-01 ENCOUNTER — HOSPITAL ENCOUNTER (EMERGENCY)
Facility: CLINIC | Age: 44
Discharge: HOME | End: 2024-09-01
Attending: EMERGENCY MEDICINE | Admitting: EMERGENCY MEDICINE
Payer: COMMERCIAL

## 2024-09-01 ENCOUNTER — APPOINTMENT (OUTPATIENT)
Dept: CT IMAGING | Facility: CLINIC | Age: 44
End: 2024-09-01
Attending: BEHAVIOR TECHNICIAN
Payer: COMMERCIAL

## 2024-09-01 VITALS
OXYGEN SATURATION: 94 % | RESPIRATION RATE: 17 BRPM | HEART RATE: 87 BPM | BODY MASS INDEX: 28.8 KG/M2 | SYSTOLIC BLOOD PRESSURE: 102 MMHG | WEIGHT: 172.84 LBS | DIASTOLIC BLOOD PRESSURE: 73 MMHG | HEIGHT: 65 IN | TEMPERATURE: 97.7 F

## 2024-09-01 DIAGNOSIS — R10.9 ABDOMINAL PAIN: ICD-10-CM

## 2024-09-01 DIAGNOSIS — K57.30 DIVERTICULOSIS OF COLON: ICD-10-CM

## 2024-09-01 LAB
ALBUMIN SERPL BCG-MCNC: 4.3 G/DL (ref 3.5–5.2)
ALBUMIN UR-MCNC: NEGATIVE MG/DL
ALP SERPL-CCNC: 64 U/L (ref 40–150)
ALT SERPL W P-5'-P-CCNC: 20 U/L (ref 0–50)
ANION GAP SERPL CALCULATED.3IONS-SCNC: 12 MMOL/L (ref 7–15)
APPEARANCE UR: CLEAR
AST SERPL W P-5'-P-CCNC: 22 U/L (ref 0–45)
BASOPHILS # BLD AUTO: 0 10E3/UL (ref 0–0.2)
BASOPHILS NFR BLD AUTO: 1 %
BILIRUB SERPL-MCNC: 0.4 MG/DL
BILIRUB UR QL STRIP: NEGATIVE
BUN SERPL-MCNC: 8.7 MG/DL (ref 6–20)
CALCIUM SERPL-MCNC: 8.8 MG/DL (ref 8.8–10.4)
CHLORIDE SERPL-SCNC: 105 MMOL/L (ref 98–107)
COLOR UR AUTO: ABNORMAL
CREAT SERPL-MCNC: 0.66 MG/DL (ref 0.51–0.95)
EGFRCR SERPLBLD CKD-EPI 2021: >90 ML/MIN/1.73M2
EOSINOPHIL # BLD AUTO: 0.1 10E3/UL (ref 0–0.7)
EOSINOPHIL NFR BLD AUTO: 2 %
ERYTHROCYTE [DISTWIDTH] IN BLOOD BY AUTOMATED COUNT: 12 % (ref 10–15)
GLUCOSE SERPL-MCNC: 141 MG/DL (ref 70–99)
GLUCOSE UR STRIP-MCNC: NEGATIVE MG/DL
HCG SERPL QL: NEGATIVE
HCO3 SERPL-SCNC: 20 MMOL/L (ref 22–29)
HCT VFR BLD AUTO: 42.1 % (ref 35–47)
HGB BLD-MCNC: 14.6 G/DL (ref 11.7–15.7)
HGB UR QL STRIP: NEGATIVE
HOLD SPECIMEN: NORMAL
IMM GRANULOCYTES # BLD: 0 10E3/UL
IMM GRANULOCYTES NFR BLD: 0 %
KETONES UR STRIP-MCNC: NEGATIVE MG/DL
LEUKOCYTE ESTERASE UR QL STRIP: NEGATIVE
LIPASE SERPL-CCNC: 32 U/L (ref 13–60)
LYMPHOCYTES # BLD AUTO: 1.9 10E3/UL (ref 0.8–5.3)
LYMPHOCYTES NFR BLD AUTO: 30 %
MCH RBC QN AUTO: 30.7 PG (ref 26.5–33)
MCHC RBC AUTO-ENTMCNC: 34.7 G/DL (ref 31.5–36.5)
MCV RBC AUTO: 89 FL (ref 78–100)
MONOCYTES # BLD AUTO: 0.5 10E3/UL (ref 0–1.3)
MONOCYTES NFR BLD AUTO: 8 %
MUCOUS THREADS #/AREA URNS LPF: PRESENT /LPF
NEUTROPHILS # BLD AUTO: 3.7 10E3/UL (ref 1.6–8.3)
NEUTROPHILS NFR BLD AUTO: 60 %
NITRATE UR QL: NEGATIVE
NRBC # BLD AUTO: 0 10E3/UL
NRBC BLD AUTO-RTO: 0 /100
PH UR STRIP: 6 [PH] (ref 5–7)
PLATELET # BLD AUTO: 181 10E3/UL (ref 150–450)
POTASSIUM SERPL-SCNC: 4.2 MMOL/L (ref 3.4–5.3)
PROT SERPL-MCNC: 6.5 G/DL (ref 6.4–8.3)
RBC # BLD AUTO: 4.75 10E6/UL (ref 3.8–5.2)
RBC URINE: <1 /HPF
SODIUM SERPL-SCNC: 137 MMOL/L (ref 135–145)
SP GR UR STRIP: 1 (ref 1–1.03)
UROBILINOGEN UR STRIP-MCNC: NORMAL MG/DL
WBC # BLD AUTO: 6.2 10E3/UL (ref 4–11)
WBC URINE: <1 /HPF

## 2024-09-01 PROCEDURE — 74177 CT ABD & PELVIS W/CONTRAST: CPT

## 2024-09-01 PROCEDURE — 99285 EMERGENCY DEPT VISIT HI MDM: CPT | Mod: 25

## 2024-09-01 PROCEDURE — 250N000011 HC RX IP 250 OP 636: Performed by: BEHAVIOR TECHNICIAN

## 2024-09-01 PROCEDURE — 85025 COMPLETE CBC W/AUTO DIFF WBC: CPT | Performed by: BEHAVIOR TECHNICIAN

## 2024-09-01 PROCEDURE — 84703 CHORIONIC GONADOTROPIN ASSAY: CPT | Performed by: BEHAVIOR TECHNICIAN

## 2024-09-01 PROCEDURE — 36415 COLL VENOUS BLD VENIPUNCTURE: CPT | Performed by: BEHAVIOR TECHNICIAN

## 2024-09-01 PROCEDURE — 80048 BASIC METABOLIC PNL TOTAL CA: CPT | Performed by: BEHAVIOR TECHNICIAN

## 2024-09-01 PROCEDURE — 81001 URINALYSIS AUTO W/SCOPE: CPT | Performed by: BEHAVIOR TECHNICIAN

## 2024-09-01 PROCEDURE — 250N000009 HC RX 250: Performed by: BEHAVIOR TECHNICIAN

## 2024-09-01 PROCEDURE — 83690 ASSAY OF LIPASE: CPT | Performed by: BEHAVIOR TECHNICIAN

## 2024-09-01 PROCEDURE — 80053 COMPREHEN METABOLIC PANEL: CPT | Performed by: BEHAVIOR TECHNICIAN

## 2024-09-01 RX ORDER — DICYCLOMINE HYDROCHLORIDE 10 MG/1
20 CAPSULE ORAL
Qty: 30 CAPSULE | Refills: 0 | Status: SHIPPED | OUTPATIENT
Start: 2024-09-01

## 2024-09-01 RX ORDER — IOPAMIDOL 755 MG/ML
500 INJECTION, SOLUTION INTRAVASCULAR ONCE
Status: COMPLETED | OUTPATIENT
Start: 2024-09-01 | End: 2024-09-01

## 2024-09-01 RX ADMIN — IOPAMIDOL 86 ML: 755 INJECTION, SOLUTION INTRAVENOUS at 12:45

## 2024-09-01 RX ADMIN — SODIUM CHLORIDE 62 ML: 9 INJECTION, SOLUTION INTRAVENOUS at 12:45

## 2024-09-01 ASSESSMENT — ACTIVITIES OF DAILY LIVING (ADL)
ADLS_ACUITY_SCORE: 35
ADLS_ACUITY_SCORE: 35

## 2024-09-01 ASSESSMENT — COLUMBIA-SUICIDE SEVERITY RATING SCALE - C-SSRS
6. HAVE YOU EVER DONE ANYTHING, STARTED TO DO ANYTHING, OR PREPARED TO DO ANYTHING TO END YOUR LIFE?: NO
2. HAVE YOU ACTUALLY HAD ANY THOUGHTS OF KILLING YOURSELF IN THE PAST MONTH?: NO
1. IN THE PAST MONTH, HAVE YOU WISHED YOU WERE DEAD OR WISHED YOU COULD GO TO SLEEP AND NOT WAKE UP?: NO

## 2024-09-01 NOTE — ED PROVIDER NOTES
"  Emergency Department Note      History of Present Illness     Chief Complaint   Abdominal Pain      HPI   Andressa Lazar is a 43 year old female with history of recurrent diverticulitis, diabetes, and tobacco abuse who presents to the ED  for evaluation of abdominal pain. The patient reports she woke up this morning at 0400 with severe lower abdominal pain. She states the pain was \"sharp\" and radiates across the entire lower abdomen. Notes she felt okay going to bed last night. She rates the pain at an 8 out of 10 in severity at its worst. She took 2 Tylenol around 0430 with some pain relief. Endorses 1 episode of diarrhea yesterday, non bloody. Andressa smokes about half a pack of cigarettes daily. Also notes a family history of both sides of diverticulitis. No nausea, vomiting, fevers, chills, dysuria, hematuria, recent illnesses, cough, rhinorrhea, shortness of breath, chest pain, back pain, leg pain, alcohol use, or previous abdominal surgeries. Denies chance of pregnancy, IUD in place. Last menstrual period about 1 week ago. She does have a follow up CT scheduled Tuesday. Of note, patient recently admitted for diverticulitis with abscess (7/30/24 - 8/1/24), states her symptoms today feel similar. Andressa has been dealing with recurring diverticulitis for about 7 years and reports flare ups occur every few months now. Her son was recently diagnosed with cancer and this could be contributing to the flare.       Independent Historian   None    Review of External Notes   Reviewed hospital admission note from 7/30/2024.  Acute sigmoid diverticulitis with abscess.  Started on IV Zosyn.  Discharged home with oral Augmentin.    Past Medical History     Medical History and Problem List   Asthma  Gestational diabetes mellitus   Diverticulitis with abscess  Chronic urticaria   Marijuana use  Tobacco abuse   Lyme disease  Chicken pox   Bilateral ovarian cysts     Medications   Xopenex   Mirena     Surgical History " "  Rhinoplasty  Ulnar nerve repair, right arm   Ear tubes   Colonoscopy     Physical Exam     Patient Vitals for the past 24 hrs:   BP Temp Temp src Pulse Resp SpO2 Height Weight   09/01/24 1215 102/73 -- -- -- -- 94 % -- --   09/01/24 1137 110/74 -- -- -- -- 95 % -- --   09/01/24 1105 138/81 97.7  F (36.5  C) Temporal 87 17 98 % 1.651 m (5' 5\") 78.4 kg (172 lb 13.5 oz)     Physical Exam  Physical Exam:  General: lying comfortably on hospital bed  Head: normocephalic, atraumatic  Eyes: PERRLA, EOMI  Ears: External ears appear normal.   Nose: no signs of bleeding   Throat: moist mucous membranes  Neck: No JVD  CV: regular rate and rhythm  Pulm: lungs clear to ausculation bilaterally, normal respiratory effort, normal chest expansion with breathing   Abdomen: soft, mild LLQ abdominal tenderness. No rebound tenderness, rigidity or guarding.   MSK: No midline tenderness  Ext: normal range of motion of all extremities. No gross deformities  Skin: warm, dry, no rashes  Neuro: alert and oriented  Psych: Appropriate mood. Cooperative      Diagnostics     Lab Results   Labs Ordered and Resulted from Time of ED Arrival to Time of ED Departure   COMPREHENSIVE METABOLIC PANEL - Abnormal       Result Value    Sodium 137      Potassium 4.2      Carbon Dioxide (CO2) 20 (*)     Anion Gap 12      Urea Nitrogen 8.7      Creatinine 0.66      GFR Estimate >90      Calcium 8.8      Chloride 105      Glucose 141 (*)     Alkaline Phosphatase 64      AST 22      ALT 20      Protein Total 6.5      Albumin 4.3      Bilirubin Total 0.4     ROUTINE UA WITH MICROSCOPIC REFLEX TO CULTURE - Abnormal    Color Urine Straw      Appearance Urine Clear      Glucose Urine Negative      Bilirubin Urine Negative      Ketones Urine Negative      Specific Gravity Urine 1.003      Blood Urine Negative      pH Urine 6.0      Protein Albumin Urine Negative      Urobilinogen Urine Normal      Nitrite Urine Negative      Leukocyte Esterase Urine Negative      " Mucus Urine Present (*)     RBC Urine <1      WBC Urine <1     LIPASE - Normal    Lipase 32     HCG QUALITATIVE PREGNANCY - Normal    hCG Serum Qualitative Negative     CBC WITH PLATELETS AND DIFFERENTIAL    WBC Count 6.2      RBC Count 4.75      Hemoglobin 14.6      Hematocrit 42.1      MCV 89      MCH 30.7      MCHC 34.7      RDW 12.0      Platelet Count 181      % Neutrophils 60      % Lymphocytes 30      % Monocytes 8      % Eosinophils 2      % Basophils 1      % Immature Granulocytes 0      NRBCs per 100 WBC 0      Absolute Neutrophils 3.7      Absolute Lymphocytes 1.9      Absolute Monocytes 0.5      Absolute Eosinophils 0.1      Absolute Basophils 0.0      Absolute Immature Granulocytes 0.0      Absolute NRBCs 0.0         Imaging   CT Abdomen Pelvis w Contrast   Final Result   IMPRESSION:    1.  Extensive colonic diverticulosis with resolution of the posterior left pelvic inflammatory change observed on CT July 30, 2024.          Independent Interpretation   None    ED Course      Medications Administered   Medications   CT Scan Flush (62 mLs Intravenous $Given 9/1/24 1245)   iopamidol (ISOVUE-370) solution 500 mL (86 mLs Intravenous $Given 9/1/24 1245)       Procedures   Procedures     Discussion of Management   None    ED Course   ED Course as of 09/16/24 2009   Sun Sep 01, 2024   1123 I evaluated patient and obtained history.   1340 Reassessed patient. Discussed discharge plans.        Additional Documentation  None    Medical Decision Making / Diagnosis     CMS Diagnoses: None    MIPS       None    MDM   Andressa Lazar is a 43 year old female with history of recurrent diverticulitis who presents to the ED for evaluation of left lower quadrant abdominal pain.  She was admitted to the hospital for diverticulitis with abscess.  She was discharged home with oral antibiotics.  She reports left lower quadrant abdominal pain that started today.  See further HPI details above.  The above workup was undertaken.   Broad differential was considered including diverticulitis, ovarian torsion, UTI, kidney stone, splenic infarct, pancreatitis, colitis, gastroenteritis.  On exam, patient is well-appearing.  Her vitals are reassuring.  She does have mild left lower quadrant tenderness.  No peritonitic signs.  No pelvic tenderness.  Labs are overall reassuring.  No leukocytosis.  Normal LFTs.  Normal lipase.  Negative hCG.  UA does not show infection or hematuria.  CT abdomen/pelvis shows extensive colonic diverticulosis without evidence of diverticulitis and resolution of the previous abscesses.  No acutely concerning findings in the workup. Will plan to discharge patient with colon and rectal surgery follow-up and prescription for Bentyl.  Patient voices understanding and is agreeable to plan of care.  Return precautions were given.     Disposition   The patient was discharged.     Diagnosis     ICD-10-CM    1. Abdominal pain  R10.9       2. Diverticulosis of colon  K57.30            Discharge Medications   New Prescriptions    DICYCLOMINE (BENTYL) 10 MG CAPSULE    Take 2 capsules (20 mg) by mouth 4 times daily (before meals and nightly).         Scribe Disclosure:  Deann JAUREGUI, am serving as a scribe at 11:33 AM on 9/1/2024 to document services personally performed by Michael Rose PA-C based on my observations and the provider's statements to me.        Michael Rose PA-C  09/16/24 2021

## 2024-09-01 NOTE — ED TRIAGE NOTES
Pt comes in with hx of diverticulitis and states she believes she is having a flair up.  Pt has some significant stressors in her life right now and believes this may factor into the flair.  She comes in for pain control and assessment of possible diverticulitis.  She states she was here a month ago with diverticulitis and abscesses in the bowel.      Triage Assessment (Adult)       Row Name 09/01/24 1103          Triage Assessment    Airway WDL WDL        Respiratory WDL    Respiratory WDL WDL        Cardiac WDL    Cardiac WDL WDL

## 2024-09-01 NOTE — DISCHARGE INSTRUCTIONS
As discussed, there's no evidence of diverticulitis on your CT. Please follow up with colon and rectal surgery as scheduled. You can take Tylenol or ibuprofen as needed for pain. You can also use Bentyl for cramping pain. Recommend increasing your fiber intake and drinking lots of water. Please return to the ER with worsening pain, fever, persistent nausea and vomiting, diarrhea, blood in stool , or any other concerns.

## 2024-09-01 NOTE — ED PROVIDER NOTES
"ED APC SUPERVISION NOTE:   I evaluated this patient in conjunction with Michael Rose PA-C  I have participated in the care of the patient and personally performed key elements of the history, exam, and medical decision making.      HPI:   Andressa Lazar is a 43 year old female with history of recurrent diverticulitis, ovarian cyst, and tobacco abuse who presents to the ED for evaluation of abdominal pain. The patient reports left lower abdominal pain at 0400 this morning. She states the pain was \"sharp\" and radiates across the entire lower abdomen somewhat reminiscent of her prior diverticulitis flares. Notes she felt okay going to bed last night. Rates the pain at an 8 out of 10 in severity at its worst. She took 2 Tylenol around 0430 this morning with some pain relief.     She denies fever, vomiting, urinary symptoms. Reports she missed an appointment recently with colorectal for her recent discharge here for diverticulitis with abscess treated medically due to finding out her son has cancer.  She states she has been under a lot of stress due to this which she thinks may have caused her symptoms.  She reports having a follow up CT scheduled for Tuesday. She has colorectal follow for discussion of hemicloectomy on Tuesday.       Independent Historian:   None    Review of External Notes:  I reviewed patient's discharge summary 8/1.     EXAM:   General: the patient is awake and interactive; well appearing.   HEENT:  Moist mucous membranes, conjunctiva normal  Pulmonary:  Normal respiratory effort  Cardiovascular:  Well perfused  Abdomen: Soft, mild LLQ tenderness, nondistended. No guarding/rebound tenderness.   Musculoskeletal:  Moving 4 extremities grossly wnl, no deformities  Neuro:  Speech normal, no focal deficits      Independent Interpretation (X-rays, CTs, rhythm strip):  None    Consultations/Discussion of Management or Tests:  None     Social Determinants of Health affecting care:   None     MEDICAL DECISION " MAKING/ASSESSMENT AND PLAN:   43-year-old female presenting to the ER for evaluation of left lower quadrant abdominal pain reminiscent of her prior bouts of diverticulitis flare.  She was recently discharged 8/1 for diverticulitis with abscess treated medically.  She denies any fever or vomiting.  She has mild LLQ tenderness on exam.  CT abdomen shows no evidence of diverticulitis or other acute process.  Patient is not pregnant.  The rest of the patient's lab studies and urine are normal. Unclear cause for patient's symptomatology today, but I do not find any life-threatening or sinister etiologies at this time.  Patient understands and is comfortable the uncertainty of the diagnosis and that certain pathologies can take time to declare itself.  Given reassuring work-up thus far, however, with reasonable clinical certainty I feel that the patient is safe to discharge home. She understands she is welcome to return to the ER for any new or worsening symptoms.  She will follow-up with colorectal as scheduled for this Tuesday.       DIAGNOSIS:     ICD-10-CM    1. Abdominal pain  R10.9       2. Diverticulosis of colon  K57.30                DISPOSITION:   Discharge     Scribe Disclosure:  SHANIA, Deann Reza, am serving as a scribe at 11:38 AM on 9/1/2024 to document services personally performed by Lobo Singletary MD based on my observations and the provider's statements to me.   9/1/2024  [facility] EMERGENCY DEPT     Lobo Singletary MD  09/01/24 9582

## 2024-09-04 ENCOUNTER — HOSPITAL ENCOUNTER (EMERGENCY)
Facility: CLINIC | Age: 44
Discharge: HOME | End: 2024-09-05
Attending: EMERGENCY MEDICINE | Admitting: EMERGENCY MEDICINE
Payer: COMMERCIAL

## 2024-09-04 ENCOUNTER — APPOINTMENT (OUTPATIENT)
Dept: CT IMAGING | Facility: CLINIC | Age: 44
End: 2024-09-04
Attending: EMERGENCY MEDICINE
Payer: COMMERCIAL

## 2024-09-04 VITALS
WEIGHT: 174.38 LBS | OXYGEN SATURATION: 98 % | TEMPERATURE: 98.1 F | HEIGHT: 65 IN | DIASTOLIC BLOOD PRESSURE: 78 MMHG | BODY MASS INDEX: 29.05 KG/M2 | HEART RATE: 92 BPM | SYSTOLIC BLOOD PRESSURE: 121 MMHG | RESPIRATION RATE: 18 BRPM

## 2024-09-04 DIAGNOSIS — K57.92 DIVERTICULITIS: ICD-10-CM

## 2024-09-04 LAB
ALBUMIN SERPL BCG-MCNC: 4.3 G/DL (ref 3.5–5.2)
ALBUMIN UR-MCNC: NEGATIVE MG/DL
ALP SERPL-CCNC: 63 U/L (ref 40–150)
ALT SERPL W P-5'-P-CCNC: 24 U/L (ref 0–50)
ANION GAP SERPL CALCULATED.3IONS-SCNC: 12 MMOL/L (ref 7–15)
APPEARANCE UR: CLEAR
AST SERPL W P-5'-P-CCNC: 18 U/L (ref 0–45)
BASOPHILS # BLD AUTO: 0 10E3/UL (ref 0–0.2)
BASOPHILS NFR BLD AUTO: 0 %
BILIRUB SERPL-MCNC: 0.4 MG/DL
BILIRUB UR QL STRIP: NEGATIVE
BUN SERPL-MCNC: 7 MG/DL (ref 6–20)
CALCIUM SERPL-MCNC: 9 MG/DL (ref 8.8–10.4)
CHLORIDE SERPL-SCNC: 104 MMOL/L (ref 98–107)
COLOR UR AUTO: NORMAL
CREAT SERPL-MCNC: 0.66 MG/DL (ref 0.51–0.95)
EGFRCR SERPLBLD CKD-EPI 2021: >90 ML/MIN/1.73M2
EOSINOPHIL # BLD AUTO: 0.2 10E3/UL (ref 0–0.7)
EOSINOPHIL NFR BLD AUTO: 1 %
ERYTHROCYTE [DISTWIDTH] IN BLOOD BY AUTOMATED COUNT: 12.3 % (ref 10–15)
GLUCOSE SERPL-MCNC: 100 MG/DL (ref 70–99)
GLUCOSE UR STRIP-MCNC: NEGATIVE MG/DL
HCG SERPL QL: NEGATIVE
HCO3 SERPL-SCNC: 21 MMOL/L (ref 22–29)
HCT VFR BLD AUTO: 41.4 % (ref 35–47)
HGB BLD-MCNC: 14.2 G/DL (ref 11.7–15.7)
HGB UR QL STRIP: NEGATIVE
HOLD SPECIMEN: NORMAL
HOLD SPECIMEN: NORMAL
IMM GRANULOCYTES # BLD: 0 10E3/UL
IMM GRANULOCYTES NFR BLD: 0 %
KETONES UR STRIP-MCNC: NEGATIVE MG/DL
LEUKOCYTE ESTERASE UR QL STRIP: NEGATIVE
LIPASE SERPL-CCNC: 29 U/L (ref 13–60)
LYMPHOCYTES # BLD AUTO: 1.8 10E3/UL (ref 0.8–5.3)
LYMPHOCYTES NFR BLD AUTO: 15 %
MCH RBC QN AUTO: 30.7 PG (ref 26.5–33)
MCHC RBC AUTO-ENTMCNC: 34.3 G/DL (ref 31.5–36.5)
MCV RBC AUTO: 89 FL (ref 78–100)
MONOCYTES # BLD AUTO: 0.7 10E3/UL (ref 0–1.3)
MONOCYTES NFR BLD AUTO: 6 %
NEUTROPHILS # BLD AUTO: 9.1 10E3/UL (ref 1.6–8.3)
NEUTROPHILS NFR BLD AUTO: 77 %
NITRATE UR QL: NEGATIVE
NRBC # BLD AUTO: 0 10E3/UL
NRBC BLD AUTO-RTO: 0 /100
PH UR STRIP: 6.5 [PH] (ref 5–7)
PLATELET # BLD AUTO: 196 10E3/UL (ref 150–450)
POTASSIUM SERPL-SCNC: 4 MMOL/L (ref 3.4–5.3)
PROT SERPL-MCNC: 6.5 G/DL (ref 6.4–8.3)
RBC # BLD AUTO: 4.63 10E6/UL (ref 3.8–5.2)
RBC URINE: <1 /HPF
SODIUM SERPL-SCNC: 137 MMOL/L (ref 135–145)
SP GR UR STRIP: 1.02 (ref 1–1.03)
SQUAMOUS EPITHELIAL: <1 /HPF
UROBILINOGEN UR STRIP-MCNC: NORMAL MG/DL
WBC # BLD AUTO: 11.9 10E3/UL (ref 4–11)
WBC URINE: <1 /HPF

## 2024-09-04 PROCEDURE — 80053 COMPREHEN METABOLIC PANEL: CPT | Performed by: EMERGENCY MEDICINE

## 2024-09-04 PROCEDURE — 36415 COLL VENOUS BLD VENIPUNCTURE: CPT | Performed by: EMERGENCY MEDICINE

## 2024-09-04 PROCEDURE — 84703 CHORIONIC GONADOTROPIN ASSAY: CPT | Performed by: EMERGENCY MEDICINE

## 2024-09-04 PROCEDURE — 81001 URINALYSIS AUTO W/SCOPE: CPT | Performed by: EMERGENCY MEDICINE

## 2024-09-04 PROCEDURE — 83690 ASSAY OF LIPASE: CPT | Performed by: EMERGENCY MEDICINE

## 2024-09-04 PROCEDURE — 96374 THER/PROPH/DIAG INJ IV PUSH: CPT | Mod: 59

## 2024-09-04 PROCEDURE — 250N000011 HC RX IP 250 OP 636: Performed by: EMERGENCY MEDICINE

## 2024-09-04 PROCEDURE — 96375 TX/PRO/DX INJ NEW DRUG ADDON: CPT

## 2024-09-04 PROCEDURE — 96361 HYDRATE IV INFUSION ADD-ON: CPT

## 2024-09-04 PROCEDURE — 99285 EMERGENCY DEPT VISIT HI MDM: CPT | Mod: 25

## 2024-09-04 PROCEDURE — 74177 CT ABD & PELVIS W/CONTRAST: CPT

## 2024-09-04 PROCEDURE — 85025 COMPLETE CBC W/AUTO DIFF WBC: CPT | Performed by: EMERGENCY MEDICINE

## 2024-09-04 PROCEDURE — 250N000009 HC RX 250: Performed by: EMERGENCY MEDICINE

## 2024-09-04 PROCEDURE — 258N000003 HC RX IP 258 OP 636: Performed by: EMERGENCY MEDICINE

## 2024-09-04 RX ORDER — KETOROLAC TROMETHAMINE 15 MG/ML
15 INJECTION, SOLUTION INTRAMUSCULAR; INTRAVENOUS ONCE
Status: COMPLETED | OUTPATIENT
Start: 2024-09-04 | End: 2024-09-04

## 2024-09-04 RX ORDER — IOPAMIDOL 755 MG/ML
120 INJECTION, SOLUTION INTRAVASCULAR ONCE
Status: COMPLETED | OUTPATIENT
Start: 2024-09-04 | End: 2024-09-04

## 2024-09-04 RX ORDER — ONDANSETRON 2 MG/ML
4 INJECTION INTRAMUSCULAR; INTRAVENOUS ONCE
Status: COMPLETED | OUTPATIENT
Start: 2024-09-04 | End: 2024-09-04

## 2024-09-04 RX ADMIN — KETOROLAC TROMETHAMINE 15 MG: 15 INJECTION, SOLUTION INTRAMUSCULAR; INTRAVENOUS at 22:58

## 2024-09-04 RX ADMIN — IOPAMIDOL 88 ML: 755 INJECTION, SOLUTION INTRAVENOUS at 23:18

## 2024-09-04 RX ADMIN — SODIUM CHLORIDE 1000 ML: 9 INJECTION, SOLUTION INTRAVENOUS at 22:58

## 2024-09-04 RX ADMIN — SODIUM CHLORIDE 62 ML: 9 INJECTION, SOLUTION INTRAVENOUS at 23:20

## 2024-09-04 RX ADMIN — ONDANSETRON 4 MG: 2 INJECTION INTRAMUSCULAR; INTRAVENOUS at 22:58

## 2024-09-04 ASSESSMENT — ACTIVITIES OF DAILY LIVING (ADL): ADLS_ACUITY_SCORE: 35

## 2024-09-05 PROCEDURE — 250N000013 HC RX MED GY IP 250 OP 250 PS 637: Performed by: EMERGENCY MEDICINE

## 2024-09-05 RX ORDER — ONDANSETRON 4 MG/1
4 TABLET, ORALLY DISINTEGRATING ORAL EVERY 8 HOURS PRN
Qty: 10 TABLET | Refills: 0 | Status: SHIPPED | OUTPATIENT
Start: 2024-09-05 | End: 2024-09-08

## 2024-09-05 RX ORDER — OXYCODONE HYDROCHLORIDE 5 MG/1
5 TABLET ORAL EVERY 6 HOURS PRN
Qty: 12 TABLET | Refills: 0 | Status: SHIPPED | OUTPATIENT
Start: 2024-09-05 | End: 2024-09-08

## 2024-09-05 RX ADMIN — AMOXICILLIN AND CLAVULANATE POTASSIUM 1 TABLET: 875; 125 TABLET, FILM COATED ORAL at 00:05

## 2024-09-05 NOTE — DISCHARGE INSTRUCTIONS
Monitor for fever, increasing abdominal pain, rectal bleeding which would necessitate return to the ED

## 2024-09-05 NOTE — ED TRIAGE NOTES
Patient was seen on Sunday for a similar issue. Patient states she has had several hours of abdominal pain. Patient reports nausea but no vomiting.  Patient has a hx of diverticulitis and diverticulosis.      Triage Assessment (Adult)       Row Name 09/04/24 2059          Triage Assessment    Airway WDL WDL        Respiratory WDL    Respiratory WDL WDL        Skin Circulation/Temperature WDL    Skin Circulation/Temperature WDL WDL        Cardiac WDL    Cardiac WDL WDL        Peripheral/Neurovascular WDL    Peripheral Neurovascular WDL WDL        Cognitive/Neuro/Behavioral WDL    Cognitive/Neuro/Behavioral WDL WDL

## 2024-09-05 NOTE — ED PROVIDER NOTES
Emergency Department Note      History of Present Illness     Chief Complaint   Abdominal Pain    HPI   Andressa Lazar is a 43 year old female with a history as noted below who presents to the emergency department for abdominal pain. The patient states that this afternoon, she began experiencing an onset of lower abdominal pain that she notes is worse than her hx of diverticulitis. She notes that she also began experiencing nausea but denies vomiting or diarrhea. She took 2 Tylenol for her pain. She was seen 4 days ago in the emergency department for similar abdominal pain and was diagnosed with colonic diverticulosis. Denies any recorded fever, chills, or cough. Denies chest pain or shortness of breath. Denies constipation. Denies vagina bleeding or vaginal discharge. Denies dysuria or urinary frequency. Denies flank pain. Denies frequent ETOH use. Denies being sexually active.     Independent Historian   None    Review of External Notes   Reviewed patient's CT abdomen and pelvis from 09/01/24, impression below:    IMPRESSION:   1.  Extensive colonic diverticulosis with resolution of the posterior left pelvic inflammatory change observed on CT July 30, 2024.    Reviewed patient's CT abdomen and pelvis from 07/30/24, impression below:    IMPRESSION:   1.  Acute sigmoid diverticulitis with adjacent severe edema. Two  locules of small fluid measuring 1.1 cm and 1.7 cm near the  diverticulitis compatible with small abscesses. No free air identified  at this time.    Past Medical History     Medical History and Problem List   Diabetes  Asthma  Diverticulitis    Medications   Cetirizine-Pseudoephedrine (ZYRTEC-D PO)  dicyclomine (BENTYL) 10 MG capsule  levalbuterol (XOPENEX HFA) 45 MCG/ACT inhaler    Surgical History   Rhinoplasty  Ulnar nerve repair    Physical Exam     Patient Vitals for the past 24 hrs:   BP Temp Temp src Pulse Resp SpO2 Height Weight   09/04/24 2100 121/78 98.1  F (36.7  C) Oral 92 18 98 % 1.651 m  "(5' 5\") 79.1 kg (174 lb 6.1 oz)     Physical Exam  Nursing note and vitals reviewed.  Constitutional: Well nourished.   Eyes: Conjunctiva normal.  Pupils are equal, round, and reactive to light.   ENT: Nose normal. Mucous membranes pink and moist.    Neck: Normal range of motion.  CVS: Normal rate, regular rhythm.  Normal heart sounds.   Pulmonary: Lungs clear to auscultation bilaterally. No wheezes/rales/rhonchi.  GI: Abdomen soft. Lower abdominal tenderness. No rigidity or guarding.  No CVA tenderness  MSK: No calf tenderness or swelling.  Neuro: Alert. Follows simple commands.  Skin: Skin is warm and dry. No rash noted.   Psychiatric: Normal affect.       Diagnostics     Lab Results   Labs Ordered and Resulted from Time of ED Arrival to Time of ED Departure   COMPREHENSIVE METABOLIC PANEL - Abnormal       Result Value    Sodium 137      Potassium 4.0      Carbon Dioxide (CO2) 21 (*)     Anion Gap 12      Urea Nitrogen 7.0      Creatinine 0.66      GFR Estimate >90      Calcium 9.0      Chloride 104      Glucose 100 (*)     Alkaline Phosphatase 63      AST 18      ALT 24      Protein Total 6.5      Albumin 4.3      Bilirubin Total 0.4     CBC WITH PLATELETS AND DIFFERENTIAL - Abnormal    WBC Count 11.9 (*)     RBC Count 4.63      Hemoglobin 14.2      Hematocrit 41.4      MCV 89      MCH 30.7      MCHC 34.3      RDW 12.3      Platelet Count 196      % Neutrophils 77      % Lymphocytes 15      % Monocytes 6      % Eosinophils 1      % Basophils 0      % Immature Granulocytes 0      NRBCs per 100 WBC 0      Absolute Neutrophils 9.1 (*)     Absolute Lymphocytes 1.8      Absolute Monocytes 0.7      Absolute Eosinophils 0.2      Absolute Basophils 0.0      Absolute Immature Granulocytes 0.0      Absolute NRBCs 0.0     HCG QUALITATIVE PREGNANCY - Normal    hCG Serum Qualitative Negative     LIPASE - Normal    Lipase 29     ROUTINE UA WITH MICROSCOPIC REFLEX TO CULTURE - Normal    Color Urine Light Yellow      Appearance " Urine Clear      Glucose Urine Negative      Bilirubin Urine Negative      Ketones Urine Negative      Specific Gravity Urine 1.022      Blood Urine Negative      pH Urine 6.5      Protein Albumin Urine Negative      Urobilinogen Urine Normal      Nitrite Urine Negative      Leukocyte Esterase Urine Negative      RBC Urine <1      WBC Urine <1      Squamous Epithelials Urine <1       Imaging   CT Abdomen Pelvis w Contrast   Final Result   IMPRESSION:    1.  Severe mid sigmoid acute diverticulitis has developed since the prior study. Currently no definite findings for perforation or abscess formation. Recommend a follow-up CT examination in 24 - 48 hours for reevaluation.        Independent Interpretation   None    ED Course      Medications Administered   Medications   sodium chloride 0.9% BOLUS 1,000 mL (0 mLs Intravenous Stopped 9/5/24 0006)   ondansetron (ZOFRAN) injection 4 mg (4 mg Intravenous $Given 9/4/24 2258)   ketorolac (TORADOL) injection 15 mg (15 mg Intravenous $Given 9/4/24 2258)   iopamidol (ISOVUE-370) solution 120 mL (88 mLs Intravenous $Given 9/4/24 2318)   Saline CT scan flush (62 mLs Intravenous $Given 9/4/24 2320)   amoxicillin-clavulanate (AUGMENTIN) 875-125 MG per tablet 1 tablet (1 tablet Oral $Given 9/5/24 0005)     Discussion of Management   None    ED Course   ED Course as of 09/05/24 0008   Wed Sep 04, 2024   2301 I obtained history and examined the patient as noted above.      2356 I discussed findings and discharge with the patient. All questions answered.          Additional Documentation  None    Medical Decision Making / Diagnosis     CMS Diagnoses: None    MIPS   None    MDM   Andressa Lazar is a 43 year old female who presents with abdominal pain.  She was recently evaluated in the ED 2 days prior though has worsening pain so decision was made to repeat CT scan which is concerning for diverticulitis. CT confirms diverticulitis without abscess or perforation. Her pain has been  controlled by the interventions in the emergency department. On recheck, she has a non-surgical exam, pain is controlled, and she is tolerating POs. No evidence to suggest underlying sepsis. She appears appropriate for outpatient management at this time.  I will prescribe Augmentin as per below. We discussed the natural history of this problem, and I discussed dietary precautions. I recommended she return to the ED for worsening pain, fever, vomiting, bloody or black stools, or for any other concerning symptoms. I recommended she follow up with her primary care physician in 2-3 days.  Will provide oxycodone for breakthrough pain and antiemetics. Encouraged close outpatient colorectal followup which she reports having an appointment with in the near future.     Disposition   The patient was discharged.     Diagnosis     ICD-10-CM    1. Diverticulitis  K57.92          Discharge Medications   Discharge Medication List as of 9/5/2024 12:06 AM        START taking these medications    Details   amoxicillin-clavulanate (AUGMENTIN) 875-125 MG tablet Take 1 tablet by mouth 3 times daily for 10 days., Disp-30 tablet, R-0, E-Prescribe           Scribe Disclosure:  I, Juwan Frost, am serving as a scribe at 11:17 PM on 9/4/2024 to document services personally performed by Vaishnavi Traylor DO based on my observations and the provider's statements to me.        Vaishnavi Traylor DO  09/05/24 3410

## 2024-09-24 ENCOUNTER — HOSPITAL ENCOUNTER (INPATIENT)
Facility: CLINIC | Age: 44
Setting detail: SURGERY ADMIT
End: 2024-09-24
Attending: COLON & RECTAL SURGERY | Admitting: COLON & RECTAL SURGERY
Payer: COMMERCIAL

## 2024-12-23 ENCOUNTER — TRANSCRIBE ORDERS (OUTPATIENT)
Dept: GASTROENTEROLOGY | Facility: CLINIC | Age: 44
End: 2024-12-23
Payer: COMMERCIAL

## 2024-12-23 ENCOUNTER — MEDICAL CORRESPONDENCE (OUTPATIENT)
Dept: HEALTH INFORMATION MANAGEMENT | Facility: CLINIC | Age: 44
End: 2024-12-23
Payer: COMMERCIAL

## 2024-12-23 DIAGNOSIS — K57.20 DIVERTICULITIS OF LARGE INTESTINE WITH ABSCESS: Primary | ICD-10-CM

## 2024-12-31 ENCOUNTER — TELEPHONE (OUTPATIENT)
Dept: GASTROENTEROLOGY | Facility: CLINIC | Age: 44
End: 2024-12-31
Payer: COMMERCIAL

## 2024-12-31 NOTE — TELEPHONE ENCOUNTER
"Endoscopy Scheduling Screen    Have you had any respiratory illness or flu-like symptoms in the last 10 days?  No    What is your communication preference for Instructions and/or Bowel Prep?   MyChart    What insurance is in the chart?  Other:  [employer]    Ordering/Referring Provider:     RONNA MEDRANO      (If ordering provider performs procedure, schedule with ordering provider unless otherwise instructed. )    BMI: Estimated body mass index is 29.02 kg/m  as calculated from the following:    Height as of 9/4/24: 1.651 m (5' 5\").    Weight as of 9/4/24: 79.1 kg (174 lb 6.1 oz).     Sedation Ordered  moderate sedation.   If patient BMI > 50 do not schedule in [facility].    If patient BMI > 45 do not schedule at [facility].    Are you taking methadone or Suboxone?  NO, No RN review required.    Have you been diagnosed and are being treated for severe PTSD or severe anxiety?  NO, No RN review required.    Are you taking any prescription medications for pain 3 or more times per week?   NO, No RN review required.    Do you have a history of malignant hyperthermia?  No    (Females) Are you currently pregnant?   No     Have you been diagnosed or told you have pulmonary hypertension?   No    Do you have an LVAD?  No    Have you been told you have moderate to severe sleep apnea?  No.    Have you been told you have COPD, asthma, or any other lung disease?  No    Do you have any heart conditions?  No     Have you ever had or are you waiting for an organ transplant?  No. Continue scheduling, no site restrictions.    Have you had a stroke or transient ischemic attack (TIA aka \"mini stroke\" in the last 6 months?   No    Have you been diagnosed with or been told you have cirrhosis of the liver?   No.    Are you currently on dialysis?   No    Do you need assistance transferring?   No    BMI: Estimated body mass index is 29.02 kg/m  as calculated from the following:    Height as of 9/4/24: 1.651 m (5' 5\").    Weight as of 9/4/24: 79.1 kg " (174 lb 6.1 oz).     Is patients BMI > 40 and scheduling location [facility]?  No    Do you take an injectable or oral medication for weight loss or diabetes (excluding insulin)?  No    Do you take the medication Naltrexone?  No    Do you take blood thinners?  No       Prep   Are you currently on dialysis or do you have chronic kidney disease?  No    Do you have a diagnosis of diabetes?  No    Do you have a diagnosis of cystic fibrosis (CF)?  No    On a regular basis do you go 3 -5 days between bowel movements?  No    BMI > 40?  No    Preferred Pharmacy:    [id number] [place] [facility] [place] [address] [facility] [address]  [facility] [place] [address]  Phone: [phone] Fax: [phone]      Final Scheduling Details     Procedure scheduled  Colonoscopy    Surgeon:  Terese    Date of procedure:  3/11/25     Pre-OP / PAC:   No - Not required for this site.    Location  [facility] - Per order.    Sedation   Moderate Sedation - Per order.      Patient Reminders:   You will receive a call from a Nurse to review instructions and health history.  This assessment must be completed prior to your procedure.  Failure to complete the Nurse assessment may result in the procedure being cancelled.      On the day of your procedure, please designate an adult(s) who can drive you home stay with you for the next 24 hours. The medicines used in the exam will make you sleepy. You will not be able to drive.      You cannot take public transportation, ride share services, or non-medical taxi service without a responsible caregiver.  Medical transport services are allowed with the requirement that a responsible caregiver will receive you at your destination.  We require that drivers and caregivers are confirmed prior to your procedure.

## 2025-02-24 ENCOUNTER — TELEPHONE (OUTPATIENT)
Dept: GASTROENTEROLOGY | Facility: CLINIC | Age: 45
End: 2025-02-24
Payer: COMMERCIAL

## 2025-02-24 NOTE — TELEPHONE ENCOUNTER
Sent message to Dr. Gudino to see if PA team should send out prep instructions or if her office has already given these to patient. No prep instructions sent.     [facility] no action needed from PA team.   --------------------------------------------------------------------------------------------------------------------    Pre visit planning completed.      Procedure details:    Patient scheduled for Colonoscopy on 3/11/25.     Arrival time: 0645. Procedure time 0720    Facility location: [facility]; [address] [place] [address]. Check in location: Main entrance, door #1 on the North side of the building under roundabout awning. DO NOT GO TO SURGERY/ED ENTRANCE.     Sedation type: Conscious sedation     Pre op exam needed? No.    Indication for procedure: diverticulitis - pre surgery colonoscopy      Chart review:     Electronic implanted devices? No    Recent diagnosis of diverticulitis within the last 6 weeks? No      Medication review:    Diabetic? No    Anticoagulants? No    Weight loss medication/injectable? No GLP-1 medication per patient's medication list. Nursing to verify with pre-assessment call.    Other medication HOLDING recommendations:  N/A      Prep for procedure:     Bowel prep recommendation: Standard Miralax.   Due to: standard bowel prep    Procedure information and instructions sent via [facility]         Nicol Salazar RN  Endoscopy Procedure Pre Assessment   [phone] option 3

## 2025-02-26 ENCOUNTER — OFFICE VISIT (OUTPATIENT)
Dept: FAMILY MEDICINE | Facility: CLINIC | Age: 45
End: 2025-02-26
Payer: COMMERCIAL

## 2025-02-26 ENCOUNTER — MYC MEDICAL ADVICE (OUTPATIENT)
Dept: FAMILY MEDICINE | Facility: CLINIC | Age: 45
End: 2025-02-26

## 2025-02-26 VITALS
HEART RATE: 93 BPM | SYSTOLIC BLOOD PRESSURE: 119 MMHG | RESPIRATION RATE: 20 BRPM | HEIGHT: 65 IN | TEMPERATURE: 98.5 F | OXYGEN SATURATION: 98 % | BODY MASS INDEX: 30.02 KG/M2 | WEIGHT: 180.2 LBS | DIASTOLIC BLOOD PRESSURE: 76 MMHG

## 2025-02-26 DIAGNOSIS — N76.0 ACUTE VAGINITIS: ICD-10-CM

## 2025-02-26 DIAGNOSIS — K57.20 DIVERTICULITIS OF LARGE INTESTINE WITH ABSCESS WITHOUT BLEEDING: ICD-10-CM

## 2025-02-26 DIAGNOSIS — Z01.818 PREOP GENERAL PHYSICAL EXAM: Primary | ICD-10-CM

## 2025-02-26 DIAGNOSIS — Z01.818 PREOP TESTING: ICD-10-CM

## 2025-02-26 DIAGNOSIS — R06.02 SOB (SHORTNESS OF BREATH): ICD-10-CM

## 2025-02-26 LAB
CLUE CELLS: ABNORMAL
ERYTHROCYTE [DISTWIDTH] IN BLOOD BY AUTOMATED COUNT: 12.5 % (ref 10–15)
HCT VFR BLD AUTO: 40.7 % (ref 35–47)
HGB BLD-MCNC: 14.1 G/DL (ref 11.7–15.7)
MCH RBC QN AUTO: 30.6 PG (ref 26.5–33)
MCHC RBC AUTO-ENTMCNC: 34.6 G/DL (ref 31.5–36.5)
MCV RBC AUTO: 88 FL (ref 78–100)
PLATELET # BLD AUTO: 208 10E3/UL (ref 150–450)
RBC # BLD AUTO: 4.61 10E6/UL (ref 3.8–5.2)
TRICHOMONAS, WET PREP: ABNORMAL
WBC # BLD AUTO: 7.5 10E3/UL (ref 4–11)
WBC'S/HIGH POWER FIELD, WET PREP: ABNORMAL
YEAST, WET PREP: ABNORMAL

## 2025-02-26 PROCEDURE — 36415 COLL VENOUS BLD VENIPUNCTURE: CPT | Performed by: PHYSICIAN ASSISTANT

## 2025-02-26 PROCEDURE — 3078F DIAST BP <80 MM HG: CPT | Performed by: PHYSICIAN ASSISTANT

## 2025-02-26 PROCEDURE — 99204 OFFICE O/P NEW MOD 45 MIN: CPT | Performed by: PHYSICIAN ASSISTANT

## 2025-02-26 PROCEDURE — 87210 SMEAR WET MOUNT SALINE/INK: CPT | Performed by: PHYSICIAN ASSISTANT

## 2025-02-26 PROCEDURE — 85027 COMPLETE CBC AUTOMATED: CPT | Performed by: PHYSICIAN ASSISTANT

## 2025-02-26 PROCEDURE — 3074F SYST BP LT 130 MM HG: CPT | Performed by: PHYSICIAN ASSISTANT

## 2025-02-26 RX ORDER — LEVALBUTEROL TARTRATE 45 UG/1
2 AEROSOL, METERED ORAL EVERY 4 HOURS PRN
Qty: 15 G | Refills: 0 | Status: SHIPPED | OUTPATIENT
Start: 2025-02-26

## 2025-02-26 NOTE — PROGRESS NOTES
Preoperative Evaluation  [facility]  [address] [address]  Phone: [phone]  Primary Provider: [facility]  Pre-op Performing Provider: Ramona Ann Aaseby-Aguilera, PA-C  Feb 26, 2025 2/26/2025   Surgical Information   What procedure is being done? robotic assisted sigmoid colectomy possible stoma   Facility or Hospital where procedure/surgery will be performed: [facility]   Who is doing the procedure / surgery? Laura Gudino MD   Date of surgery / procedure: 3/12/2025   Time of surgery / procedure: 11:15AM   Where do you plan to recover after surgery? at home with family     Fax number for surgical facility: Note does not need to be faxed, will be available electronically in Epic.    Assessment & Plan     The proposed surgical procedure is considered INTERMEDIATE risk.    Preop general physical exam      Diverticulitis of large intestine with abscess without bleeding              - No identified additional risk factors other than previously addressed    Preoperative Medication Instructions  Antiplatelet or Anticoagulation Medication Instructions   - We reviewed the medication list and the patient is not on an antiplatelet or anticoagulation medications.    Additional Medication Instructions  We reviewed the medication list and there are no chronic medications that need to be adjusted for this procedure.    Recommendation  Approval given to proceed with proposed procedure, without further diagnostic evaluation.    David Crisostomo is a 44 year old, presenting for the following:  Pre-Op Exam and Vaginitis (Been taking antibiotic beginning of January and took Diflucan./one month fishy odor,discharge, mild itchy, no pain, no fever)          2/26/2025     2:39 PM   Additional Questions   Roomed by Reid   Accompanied by self     HPI related to upcoming procedure: history of 12 diverticulitis episodes        2/26/2025    Pre-Op Questionnaire   Have you ever had a heart attack or stroke? No   Have you ever had surgery on your heart or blood vessels, such as a stent placement, a coronary artery bypass, or surgery on an artery in your head, neck, heart, or legs? No   Do you have chest pain with activity? No   Do you have a history of heart failure? No   Do you currently have a cold, bronchitis or symptoms of other infection? No   Do you have a cough, shortness of breath, or wheezing? No   Do you or anyone in your family have previous history of blood clots? No   Do you or does anyone in your family have a serious bleeding problem such as prolonged bleeding following surgeries or cuts? No   Have you ever had problems with anemia or been told to take iron pills? No   Have you had any abnormal blood loss such as black, tarry or bloody stools, or abnormal vaginal bleeding? No   Have you ever had a blood transfusion? No   Are you willing to have a blood transfusion if it is medically needed before, during, or after your surgery? Yes   Have you or any of your relatives ever had problems with anesthesia? No   Do you have sleep apnea, excessive snoring or daytime drowsiness? No   Do you have any artifical heart valves or other implanted medical devices like a pacemaker, defibrillator, or continuous glucose monitor? No   Do you have artificial joints? No   Are you allergic to latex? No     Health Care Directive  Patient does not have a Health Care Directive: Discussed advance care planning with patient; information given to patient to review.    Preoperative Review of    reviewed - no record of controlled substances prescribed.      Status of Chronic Conditions:  See problem list for active medical problems.  Problems all longstanding and stable, except as noted/documented.  See ROS for pertinent symptoms related to these conditions.    Patient Active Problem List    Diagnosis Date Noted    Diverticulitis of large intestine with abscess  without bleeding 2024     Priority: Medium     (normal spontaneous vaginal delivery) 2019     Priority: Medium    Gestational diabetes mellitus (GDM) in third trimester 2019     Priority: Medium      Past Medical History:   Diagnosis Date    Diabetes (H)     Gestational on insulin    Uncomplicated asthma     in the past     Past Surgical History:   Procedure Laterality Date    ENT SURGERY      rhinoplasty    ORTHOPEDIC SURGERY      ulnar nerve repair-right arm     Current Outpatient Medications   Medication Sig Dispense Refill    Cetirizine-Pseudoephedrine (ZYRTEC-D PO) Take 1 tablet by mouth daily 24hour Zyrtec*      levalbuterol (XOPENEX HFA) 45 MCG/ACT inhaler Inhale 2 puffs into the lungs every 4 hours as needed for shortness of breath or wheezing      levonorgestrel (MIRENA) 52 MG (20 mcg/day) IUD by Intrauterine route once. Placed 2019, [facility]      multivitamin, therapeutic (THERA-VIT) TABS tablet Take 1 tablet by mouth daily      Probiotic Product (PROBIOTIC PO) Take 1 capsule by mouth daily      dicyclomine (BENTYL) 10 MG capsule Take 2 capsules (20 mg) by mouth 4 times daily (before meals and nightly). (Patient not taking: Reported on 2025) 30 capsule 0       No Known Allergies     Social History     Tobacco Use    Smoking status: Every Day     Current packs/day: 0.25     Average packs/day: 0.3 packs/day for 30.2 years (7.5 ttl pk-yrs)     Types: Cigarettes     Start date:     Smokeless tobacco: Never    Tobacco comments:     quit in early pregnancy   Substance Use Topics    Alcohol use: Never     No family history on file.  History   Drug Use    Types: Marijuana     Comment: quit THC in early pregnancy             Review of Systems  CONSTITUTIONAL: NEGATIVE for fever, chills, change in weight  INTEGUMENTARY/SKIN: NEGATIVE for worrisome rashes, moles or lesions  EYES: NEGATIVE for vision changes or irritation  ENT/MOUTH: NEGATIVE for ear, mouth and throat  "problems  RESP: NEGATIVE for significant cough or SOB  BREAST: NEGATIVE for masses, tenderness or discharge  CV: NEGATIVE for chest pain, palpitations or peripheral edema  GI: NEGATIVE for nausea, abdominal pain, heartburn, or change in bowel habits  : NEGATIVE for frequency, dysuria, or hematuria  MUSCULOSKELETAL: NEGATIVE for significant arthralgias or myalgia  NEURO: NEGATIVE for weakness, dizziness or paresthesias  ENDOCRINE: NEGATIVE for temperature intolerance, skin/hair changes  HEME: NEGATIVE for bleeding problems  PSYCHIATRIC: NEGATIVE for changes in mood or affect    Objective    /76 (BP Location: Right arm, Patient Position: Sitting, Cuff Size: Adult Large)   Pulse 93   Temp 98.5  F (36.9  C) (Oral)   Resp 20   Ht 1.645 m (5' 4.75\")   Wt 81.7 kg (180 lb 3.2 oz)   SpO2 98%   BMI 30.22 kg/m     Estimated body mass index is 30.22 kg/m  as calculated from the following:    Height as of this encounter: 1.645 m (5' 4.75\").    Weight as of this encounter: 81.7 kg (180 lb 3.2 oz).  Physical Exam  GENERAL: alert and no distress  EYES: Eyes grossly normal to inspection, PERRL and conjunctivae and sclerae normal  HENT: ear canals and TM's normal, nose and mouth without ulcers or lesions  RESP: lungs clear to auscultation - no rales, rhonchi or wheezes  CV: regular rate and rhythm, normal S1 S2, no S3 or S4, no murmur, click or rub, no peripheral edema   ABDOMEN: soft, nontender, no hepatosplenomegaly, no masses and bowel sounds normal  MS: no gross musculoskeletal defects noted, no edema    Recent Labs   Lab Test 09/04/24  2143 09/01/24  1127   HGB 14.2 14.6    181    137   POTASSIUM 4.0 4.2   CR 0.66 0.66        Diagnostics  Results for orders placed or performed in visit on 02/26/25   CBC with platelets     Status: Normal   Result Value Ref Range    WBC Count 7.5 4.0 - 11.0 10e3/uL    RBC Count 4.61 3.80 - 5.20 10e6/uL    Hemoglobin 14.1 11.7 - 15.7 g/dL    Hematocrit 40.7 35.0 - 47.0 % "    MCV 88 78 - 100 fL    MCH 30.6 26.5 - 33.0 pg    MCHC 34.6 31.5 - 36.5 g/dL    RDW 12.5 10.0 - 15.0 %    Platelet Count 208 150 - 450 10e3/uL   Wet prep - lab collect     Status: Abnormal    Specimen: Vagina; Swab   Result Value Ref Range    Trichomonas Absent Absent    Yeast Absent Absent    Clue Cells Absent Absent    WBCs/high power field 1+ (A) None      No EKG required for low risk surgery (cataract, skin procedure, breast biopsy, etc).    Revised Cardiac Risk Index (RCRI)  The patient has the following serious cardiovascular risks for perioperative complications:   - No serious cardiac risks = 0 points     RCRI Interpretation: 0 points: Class I (very low risk - 0.4% complication rate)         Signed Electronically by: Ramona Ann Aaseby-Aguilera, PA-C  A copy of this evaluation report is provided to the requesting physician.

## 2025-02-26 NOTE — PATIENT INSTRUCTIONS
How to Take Your Medication Before Surgery  Preoperative Medication Instructions   Antiplatelet or Anticoagulation Medication Instructions   - We reviewed the medication list and the patient is not on an antiplatelet or anticoagulation medications.    Additional Medication Instructions  We reviewed the medication list and there are no chronic medications that need to be adjusted for this procedure.       Patient Education   Preparing for Your Surgery  For Adults  Getting started  In most cases, a nurse will call to review your health history and instructions. They will give you an arrival time based on your scheduled surgery time. Please be ready to share:  Your doctor's clinic name and phone number  Your medical, surgical, and anesthesia history  A list of allergies and sensitivities  A list of medicines, including herbal treatments and over-the-counter drugs  Whether the patient has a legal guardian (ask how to send us the papers in advance)  Note: You may not receive a call if you were seen at our PAC (Preoperative Assessment Center).  Please tell us if you're pregnant--or if there's any chance you might be pregnant. Some surgeries may injure a fetus (unborn baby), so they require a pregnancy test. Surgeries that are safe for a fetus don't always need a test, and you can choose whether to have one.   Preparing for surgery  Within 10 to 30 days of surgery: Have a pre-op exam (sometimes called an H&P, or History and Physical). This can be done at a clinic or pre-operative center.  If you're having a , you may not need this exam. Talk to your care team.  At your pre-op exam, talk to your care team about all medicines you take. (This includes CBD oil and any drugs, such as THC, marijuana, and other forms of cannabis.) If you need to stop any medicine before surgery, ask when to start taking it again.  This is for your safety. Many medicines and drugs can make you bleed too much during surgery. Some change  how well surgery (anesthesia) drugs work.  Call your insurance company to let them know you're having surgery. (If you don't have insurance, call [phone].)  Call your clinic if there's any change in your health. This includes a scrape or scratch near the surgery site, or any signs of a cold (sore throat, runny nose, cough, rash, fever).  Eating and drinking guidelines  For your safety: Unless your surgeon tells you otherwise, follow the guidelines below.  Eat and drink as normal until 8 hours before you arrive for surgery. After that, no food or milk. You can spit out gum when you arrive.  Drink clear liquids until 2 hours before you arrive. These are liquids you can see through, like water, Gatorade, and Propel Water. They also include plain black coffee and tea (no cream or milk).  No alcohol for 24 hours before you arrive. The night before surgery, stop any drinks that contain THC.  If your care team tells you to take medicine on the morning of surgery, it's okay to take it with a sip of water. No other medicines or drugs are allowed (including CBD oil)--follow your care team's instructions.  If you have questions the day of surgery, call your hospital or surgery center.   Preventing infection  Shower or bathe the night before and the morning of surgery. Follow the instructions your clinic gave you. (If no instructions, use regular soap.)  Don't shave or clip hair near your surgery site. We'll remove the hair if needed.  Don't smoke or vape the morning of surgery. No chewing tobacco for 6 hours before you arrive. A nicotine patch is okay. You may spit out nicotine gum when you arrive.  For some surgeries, the surgeon will tell you to fully quit smoking and nicotine.  We will make every effort to keep you safe from infection. We will:  Clean our hands often with soap and water (or an alcohol-based hand rub).  Clean the skin at your surgery site with a special soap that kills germs.  Give you a special gown to  keep you warm. (Cold raises the risk of infection.)  Wear hair covers, masks, gowns, and gloves during surgery.  Give antibiotic medicine, if prescribed. Not all surgeries need this medicine.  What to bring on the day of surgery  Photo ID and insurance card  Copy of your health care directive, if you have one  Glasses and hearing aids (bring cases)  You can't wear contacts during surgery  Inhaler and eye drops, if you use them (tell us about these when you arrive)  CPAP machine or breathing device, if you use them  A few personal items, if spending the night  If you have . . .  A pacemaker, ICD (cardiac defibrillator), or other implant: Bring the ID card.  An implanted stimulator: Bring the remote control.  A legal guardian: Bring a copy of the certified (court-stamped) guardianship papers.  Please remove any jewelry, including body piercings. Leave jewelry and other valuables at home.  If you're going home the day of surgery  You must have a responsible adult drive you home. They should stay with you overnight as well.  If you don't have someone to stay with you, and you aren't safe to go home alone, we may keep you overnight. Insurance often won't pay for this.  After surgery  If it's hard to control your pain or you need more pain medicine, please call your surgeon's office.  Questions?   If you have any questions for your care team, list them here:   ____________________________________________________________________________________________________________________________________________________________________________________________________________________________________________________________  For informational purposes only. Not to replace the advice of your health care provider. Copyright   2003, 2019 [facility]. All rights reserved. Clinically reviewed by Guru Shankar MD. [employer] [id number] - REV 08/24.

## 2025-02-27 ENCOUNTER — TELEPHONE (OUTPATIENT)
Dept: FAMILY MEDICINE | Facility: CLINIC | Age: 45
End: 2025-02-27
Payer: COMMERCIAL

## 2025-02-27 DIAGNOSIS — R06.02 SOB (SHORTNESS OF BREATH): Primary | ICD-10-CM

## 2025-02-27 RX ORDER — ALBUTEROL SULFATE 90 UG/1
2 INHALANT RESPIRATORY (INHALATION) EVERY 4 HOURS PRN
Qty: 18 G | Refills: 1 | Status: SHIPPED | OUTPATIENT
Start: 2025-02-27

## 2025-02-27 NOTE — TELEPHONE ENCOUNTER
Pharmacy states that the medication levalbuterol (XOPENEX HFA) 45 MCG/ACT inhaler is not covered under patient plan. Requesting a preferred alternative ALBUTEROL SULFATE HFA.    Order Information    Ordered Status Priority Ordering User Department   02/26/25 Sent (none) Aaseby-Aguilera, Ramona Ann, PA-C  FAMILY PRACTICE     Order History  Outpatient  Date/Time Action Taken User Additional Information   02/26/25 1456 Pend Reid Brandon P, CMA    02/26/25 1516 Sign Aaseby-Aguilera, Ramona Ann, PA-C Reorder from Order:[id number]   02/26/25 1516 Taking Flag Checked Aaseby-Aguilera, Ramona Ann, PA-C [phone]     Outpatient Medication Detail     Disp Refills Start End CONNOR   levalbuterol (XOPENEX HFA) 45 MCG/ACT inhaler 15 g 0 2/26/2025 -- No   Sig - Route: Inhale 2 puffs into the lungs every 4 hours as needed for shortness of breath or wheezing. - Inhalation   Sent to pharmacy as: Levalbuterol Tartrate 45 MCG/ACT Inhalation Aerosol (XOPENEX HFA)   Class: E-Prescribe   Order: [id number]   E-Prescribing Status: Receipt confirmed by pharmacy (2/26/2025  3:16 PM CST)     Printout Tracking    External Result Report     Pharmacy    [id number] [address] AT [facility] [address]     Associated Diagnoses    SOB (shortness of breath) [R06.02]

## 2025-03-03 NOTE — TELEPHONE ENCOUNTER
Patient calling back & states she never received any call back regarding discussing her results.    She has surgery next week & needs to discuss this.    If unable to reach her with number on file, please call [phone] (work cell).

## 2025-03-04 NOTE — TELEPHONE ENCOUNTER
Ramona Aaseby-Aguilera, PA-C has repled on [facility] lab result note, I resent that message via [employer]. Carmita Elliott R.N.

## 2025-03-06 RX ORDER — POLYETHYLENE GLYCOL 3350 17 G/17G
238 POWDER, FOR SOLUTION ORAL ONCE
Status: ON HOLD | COMMUNITY
End: 2025-03-12

## 2025-03-06 RX ORDER — NEOMYCIN SULFATE 500 MG/1
1000 TABLET ORAL 4 TIMES DAILY
Status: ON HOLD | COMMUNITY
End: 2025-03-12

## 2025-03-06 RX ORDER — ONDANSETRON 4 MG/1
4 TABLET, FILM COATED ORAL EVERY 8 HOURS PRN
Status: ON HOLD | COMMUNITY
End: 2025-03-12

## 2025-03-06 RX ORDER — METRONIDAZOLE 500 MG/1
500 TABLET ORAL 3 TIMES DAILY
Status: ON HOLD | COMMUNITY
End: 2025-03-12

## 2025-03-06 RX ORDER — BISACODYL 5 MG/1
5 TABLET, DELAYED RELEASE ORAL DAILY PRN
Status: ON HOLD | COMMUNITY
End: 2025-03-12

## 2025-03-06 RX ORDER — CETIRIZINE HYDROCHLORIDE 10 MG/1
10 TABLET ORAL DAILY
COMMUNITY

## 2025-03-06 NOTE — PROGRESS NOTES
PTA medications updated by Medication Scribe prior to surgery via phone call with patient (last doses completed by Nurse)     Medication history sources: Patient, Surescripts, and H&P  In the past week, patient estimated taking medication this percent of the time: Greater than 90%      Significant changes made to the medication list:  Patient reports no longer taking the following meds (med scribe removed from PTA med list): Xopenex      Additional medication history information:   Patient was advised to bring: Albuterol    Medication reconciliation completed by provider prior to medication history? No    Time spent in this activity: 30 minutes    The information provided in this note is only as accurate as the sources available at the time of update(s)      Prior to Admission medications    Medication Sig Last Dose Taking? Auth Provider Long Term End Date   bisacodyl (DULCOLAX) 5 MG EC tablet Take 5 mg by mouth daily as needed for constipation. Noon Yes Reported, Patient     cetirizine (ZYRTEC) 10 MG tablet Take 10 mg by mouth daily. Morning Yes Reported, Patient     magnesium citrate solution (NOT currently available) Take 296 mLs by mouth once. Morning Yes Reported, Patient     metroNIDAZOLE (FLAGYL) 500 MG tablet Take 500 mg by mouth 3 times daily. Three times day before surgery; at 1pm, 2pm, and 11pm 11:00 PM Yes Reported, Patient     neomycin (MYCIFRADIN) 500 MG tablet Take 1,000 mg by mouth 4 times daily.  Yes Reported, Patient     ondansetron (ZOFRAN) 4 MG tablet Take 4 mg by mouth every 8 hours as needed for nausea.  Yes Reported, Patient     polyethylene glycol (MIRALAX) 17 GM/Dose powder Take 238 g by mouth once.  4:00 PM Yes Reported, Patient     albuterol (PROAIR HFA/PROVENTIL HFA/VENTOLIN HFA) 108 (90 Base) MCG/ACT inhaler Inhale 2 puffs into the lungs every 4 hours as needed for shortness of breath or wheezing.  Yes Aaseby-Aguilera, Ramona Ann, PA-C Yes    Cetirizine-Pseudoephedrine (ZYRTEC-D PO) Take  1 tablet by mouth daily 24hour Zyrtec*  Yes Unknown, Entered By History     levonorgestrel (MIRENA) 52 MG (20 mcg/day) IUD by Intrauterine route once. Placed 11/2019, [facility]  Yes Reported, Patient Yes    multivitamin, therapeutic (THERA-VIT) TABS tablet Take 1 tablet by mouth daily 3/5/2025 Morning Yes Unknown, Entered By History     Probiotic Product (PROBIOTIC PO) Take 1 capsule by mouth daily 3/5/2025 Morning Yes Unknown, Entered By History         Medication history completed by: Dennis Reyes

## 2025-03-11 ENCOUNTER — HOSPITAL ENCOUNTER (OUTPATIENT)
Facility: CLINIC | Age: 45
Discharge: HOME | End: 2025-03-11
Attending: COLON & RECTAL SURGERY | Admitting: COLON & RECTAL SURGERY
Payer: COMMERCIAL

## 2025-03-11 VITALS
HEART RATE: 61 BPM | SYSTOLIC BLOOD PRESSURE: 103 MMHG | RESPIRATION RATE: 15 BRPM | OXYGEN SATURATION: 98 % | DIASTOLIC BLOOD PRESSURE: 66 MMHG

## 2025-03-11 LAB — COLONOSCOPY: NORMAL

## 2025-03-11 PROCEDURE — 258N000003 HC RX IP 258 OP 636: Performed by: COLON & RECTAL SURGERY

## 2025-03-11 PROCEDURE — 45380 COLONOSCOPY AND BIOPSY: CPT | Performed by: COLON & RECTAL SURGERY

## 2025-03-11 PROCEDURE — 88305 TISSUE EXAM BY PATHOLOGIST: CPT | Mod: TC | Performed by: COLON & RECTAL SURGERY

## 2025-03-11 PROCEDURE — 250N000011 HC RX IP 250 OP 636: Performed by: COLON & RECTAL SURGERY

## 2025-03-11 PROCEDURE — 45385 COLONOSCOPY W/LESION REMOVAL: CPT | Performed by: COLON & RECTAL SURGERY

## 2025-03-11 PROCEDURE — 250N000013 HC RX MED GY IP 250 OP 250 PS 637: Performed by: COLON & RECTAL SURGERY

## 2025-03-11 PROCEDURE — 88305 TISSUE EXAM BY PATHOLOGIST: CPT | Mod: 26 | Performed by: PATHOLOGY

## 2025-03-11 PROCEDURE — 99153 MOD SED SAME PHYS/QHP EA: CPT | Performed by: COLON & RECTAL SURGERY

## 2025-03-11 PROCEDURE — G0500 MOD SEDAT ENDO SERVICE >5YRS: HCPCS | Performed by: COLON & RECTAL SURGERY

## 2025-03-11 RX ORDER — LIDOCAINE 40 MG/G
CREAM TOPICAL
Status: DISCONTINUED | OUTPATIENT
Start: 2025-03-11 | End: 2025-03-11 | Stop reason: HOSPADM

## 2025-03-11 RX ORDER — ONDANSETRON 2 MG/ML
4 INJECTION INTRAMUSCULAR; INTRAVENOUS
Status: DISCONTINUED | OUTPATIENT
Start: 2025-03-11 | End: 2025-03-11 | Stop reason: HOSPADM

## 2025-03-11 RX ORDER — ATROPINE SULFATE 0.1 MG/ML
1 INJECTION INTRAVENOUS
Status: DISCONTINUED | OUTPATIENT
Start: 2025-03-11 | End: 2025-03-11 | Stop reason: HOSPADM

## 2025-03-11 RX ORDER — ONDANSETRON 2 MG/ML
4 INJECTION INTRAMUSCULAR; INTRAVENOUS EVERY 6 HOURS PRN
Status: DISCONTINUED | OUTPATIENT
Start: 2025-03-11 | End: 2025-03-11 | Stop reason: HOSPADM

## 2025-03-11 RX ORDER — EPINEPHRINE 1 MG/ML
0.1 INJECTION, SOLUTION, CONCENTRATE INTRAVENOUS
Status: DISCONTINUED | OUTPATIENT
Start: 2025-03-11 | End: 2025-03-11 | Stop reason: HOSPADM

## 2025-03-11 RX ORDER — DIPHENHYDRAMINE HYDROCHLORIDE 50 MG/ML
25-50 INJECTION, SOLUTION INTRAMUSCULAR; INTRAVENOUS
Status: DISCONTINUED | OUTPATIENT
Start: 2025-03-11 | End: 2025-03-11 | Stop reason: HOSPADM

## 2025-03-11 RX ORDER — ONDANSETRON 4 MG/1
4 TABLET, ORALLY DISINTEGRATING ORAL EVERY 6 HOURS PRN
Status: DISCONTINUED | OUTPATIENT
Start: 2025-03-11 | End: 2025-03-11 | Stop reason: HOSPADM

## 2025-03-11 RX ORDER — PROCHLORPERAZINE MALEATE 10 MG
10 TABLET ORAL EVERY 6 HOURS PRN
Status: DISCONTINUED | OUTPATIENT
Start: 2025-03-11 | End: 2025-03-11 | Stop reason: HOSPADM

## 2025-03-11 RX ORDER — NALOXONE HYDROCHLORIDE 0.4 MG/ML
0.2 INJECTION, SOLUTION INTRAMUSCULAR; INTRAVENOUS; SUBCUTANEOUS
Status: DISCONTINUED | OUTPATIENT
Start: 2025-03-11 | End: 2025-03-11 | Stop reason: HOSPADM

## 2025-03-11 RX ORDER — SIMETHICONE 40MG/0.6ML
133 SUSPENSION, DROPS(FINAL DOSAGE FORM)(ML) ORAL
Status: COMPLETED | OUTPATIENT
Start: 2025-03-11 | End: 2025-03-11

## 2025-03-11 RX ORDER — NALOXONE HYDROCHLORIDE 0.4 MG/ML
0.4 INJECTION, SOLUTION INTRAMUSCULAR; INTRAVENOUS; SUBCUTANEOUS
Status: DISCONTINUED | OUTPATIENT
Start: 2025-03-11 | End: 2025-03-11 | Stop reason: HOSPADM

## 2025-03-11 RX ORDER — FLUMAZENIL 0.1 MG/ML
0.2 INJECTION, SOLUTION INTRAVENOUS
Status: DISCONTINUED | OUTPATIENT
Start: 2025-03-11 | End: 2025-03-11 | Stop reason: HOSPADM

## 2025-03-11 RX ORDER — FENTANYL CITRATE 50 UG/ML
25-100 INJECTION, SOLUTION INTRAMUSCULAR; INTRAVENOUS EVERY 5 MIN PRN
Status: DISCONTINUED | OUTPATIENT
Start: 2025-03-11 | End: 2025-03-11 | Stop reason: HOSPADM

## 2025-03-11 RX ADMIN — SIMETHICONE 133 MG: 20 SUSPENSION/ DROPS ORAL at 07:44

## 2025-03-11 RX ADMIN — MIDAZOLAM 2 MG: 1 INJECTION INTRAMUSCULAR; INTRAVENOUS at 07:29

## 2025-03-11 RX ADMIN — SODIUM CHLORIDE 500 ML: 0.9 INJECTION, SOLUTION INTRAVENOUS at 07:21

## 2025-03-11 RX ADMIN — MIDAZOLAM 1 MG: 1 INJECTION INTRAMUSCULAR; INTRAVENOUS at 07:37

## 2025-03-11 RX ADMIN — FENTANYL CITRATE 100 MCG: 50 INJECTION, SOLUTION INTRAMUSCULAR; INTRAVENOUS at 07:29

## 2025-03-11 ASSESSMENT — ACTIVITIES OF DAILY LIVING (ADL)
ADLS_ACUITY_SCORE: 42
ADLS_ACUITY_SCORE: 42

## 2025-03-11 NOTE — H&P
Pre-Endoscopy History and Physical     Andressa Lazar MRN# [id number]   YOB: 1980 Age: 44 year old     Date of Procedure: 3/11/2025  Primary care provider: Clinic, [facility] [place]  Type of Endoscopy: Colonoscopy  Reason for Procedure: Screening  Type of Anesthesia Anticipated: Moderate Sedation    HPI:    Andressa is a 44 year old female who will be undergoing the above procedure.      A history and physical has been performed. The patient's medications and allergies have been reviewed. The risks and benefits of the procedure and the sedation options and risks were discussed with the patient.  All questions were answered and informed consent was obtained.      She denies a personal or family history of anesthesia complications or bleeding disorders.     No Known Allergies     Current Facility-Administered Medications   Medication Dose Route Frequency Provider Last Rate Last Admin    atropine injection 1 mg  1 mg Intravenous Once PRN Estefany Gudino MD        benzocaine 20% (HURRICAINE/TOPEX) 20 % spray 0.5 mL  1 spray Mouth/Throat Once PRN Estefany Gudino MD        diphenhydrAMINE (BENADRYL) injection 25-50 mg  25-50 mg Intravenous Once PRN Estefany Gudino MD        EPINEPHrine PF (ADRENALIN) injection 0.1 mg  0.1 mg Submucosal Once PRN Estefany Gudino MD        fentaNYL (PF) (SUBLIMAZE) injection  mcg   mcg Intravenous Q5 Min PRN Estefany Gudino MD        flumazenil (ROMAZICON) injection 0.2 mg  0.2 mg Intravenous q1 min prn Estefany Gudino MD        glucagon injection 0.5 mg  0.5 mg Intravenous Once PRN Estefany Gudino MD        midazolam (VERSED) injection 0.5-2 mg  0.5-2 mg Intravenous Q4 Min PRN Estefany Gudino MD        naloxone (NARCAN) injection 0.2 mg  0.2 mg Intravenous Q2 Min PRN Estefany Gudino MD        Or    naloxone (NARCAN) injection 0.4 mg  0.4 mg Intravenous Q2 Min PRN Estefany Gudino MD        Or     "naloxone (NARCAN) injection 0.2 mg  0.2 mg Intramuscular Q2 Min PRN Estefany Gudino MD        Or    naloxone (NARCAN) injection 0.4 mg  0.4 mg Intramuscular Q2 Min PRN Estefany Gudino MD        simethicone (MYLICON) suspension 133 mg  133 mg Oral Once PRN Estefany Gudino MD        sodium chloride (PF) 0.9% PF flush 3 mL  3 mL Intravenous q1 min prn Estefany Gudino MD        sodium chloride 0.9% BOLUS 500 mL  500 mL Intravenous Once PRN Estefany Gudino MD           Patient Active Problem List   Diagnosis    Gestational diabetes mellitus (GDM) in third trimester     (normal spontaneous vaginal delivery)    Diverticulitis of large intestine with abscess without bleeding        Past Medical History:   Diagnosis Date    Diabetes (H)     Gestational on insulin    Uncomplicated asthma     in the past        Past Surgical History:   Procedure Laterality Date    COLONOSCOPY      ENT SURGERY      rhinoplasty    ORTHOPEDIC SURGERY      ulnar nerve repair-right arm       Social History     Tobacco Use    Smoking status: Every Day     Current packs/day: 0.25     Average packs/day: 0.3 packs/day for 30.2 years (7.5 ttl pk-yrs)     Types: Cigarettes     Start date:     Smokeless tobacco: Never    Tobacco comments:     quit in early pregnancy   Substance Use Topics    Alcohol use: Never       Family History   Problem Relation Age of Onset    Colon Cancer No family hx of        REVIEW OF SYSTEMS:     5 point ROS negative except as noted above in HPI, including Gen., Resp., CV, GI &  system review.      PHYSICAL EXAM:   There were no vitals taken for this visit. Estimated body mass index is 30.22 kg/m  as calculated from the following:    Height as of 25: 1.645 m (5' 4.75\").    Weight as of 25: 81.7 kg (180 lb 3.2 oz).   GENERAL APPEARANCE: healthy and alert  MENTAL STATUS: alert  AIRWAY EXAM: Mallampatti Class I (visualization of the soft palate, fauces, uvula, anterior and posterior " pillars)  RESP: lungs clear to auscultation - no rales, rhonchi or wheezes  CV: regular rates and rhythm      IMPRESSION   ASA Class 2 - Mild systemic disease        PLAN:     Plan for colonoscopy. We discussed the risks, benefits and alternatives and the patient wished to proceed.    The above has been forwarded to the consulting provider.      Laura Gudino MD  [facility]  Phone: [phone]  Fax: [phone]  March 11, 2025

## 2025-03-12 ENCOUNTER — ANESTHESIA (OUTPATIENT)
Dept: SURGERY | Facility: CLINIC | Age: 45
End: 2025-03-12
Payer: COMMERCIAL

## 2025-03-12 ENCOUNTER — ANESTHESIA EVENT (OUTPATIENT)
Dept: SURGERY | Facility: CLINIC | Age: 45
End: 2025-03-12
Payer: COMMERCIAL

## 2025-03-12 ENCOUNTER — HOSPITAL ENCOUNTER (INPATIENT)
Facility: CLINIC | Age: 45
DRG: 331 | End: 2025-03-12
Attending: COLON & RECTAL SURGERY | Admitting: COLON & RECTAL SURGERY
Payer: COMMERCIAL

## 2025-03-12 DIAGNOSIS — K57.92 DIVERTICULITIS: Primary | ICD-10-CM

## 2025-03-12 LAB
ABO + RH BLD: NORMAL
ANION GAP SERPL CALCULATED.3IONS-SCNC: 9 MMOL/L (ref 7–15)
BLD GP AB SCN SERPL QL: NEGATIVE
BUN SERPL-MCNC: 6.4 MG/DL (ref 6–20)
CALCIUM SERPL-MCNC: 8.5 MG/DL (ref 8.8–10.4)
CHLORIDE SERPL-SCNC: 108 MMOL/L (ref 98–107)
CREAT SERPL-MCNC: 0.65 MG/DL (ref 0.51–0.95)
EGFRCR SERPLBLD CKD-EPI 2021: >90 ML/MIN/1.73M2
GLUCOSE SERPL-MCNC: 100 MG/DL (ref 70–99)
HCG UR QL: NEGATIVE
HCO3 SERPL-SCNC: 21 MMOL/L (ref 22–29)
HGB BLD-MCNC: 14 G/DL (ref 11.7–15.7)
PATH REPORT.COMMENTS IMP SPEC: NORMAL
PATH REPORT.COMMENTS IMP SPEC: NORMAL
PATH REPORT.FINAL DX SPEC: NORMAL
PATH REPORT.GROSS SPEC: NORMAL
PATH REPORT.MICROSCOPIC SPEC OTHER STN: NORMAL
PATH REPORT.RELEVANT HX SPEC: NORMAL
PHOTO IMAGE: NORMAL
POTASSIUM SERPL-SCNC: 3.9 MMOL/L (ref 3.4–5.3)
SODIUM SERPL-SCNC: 138 MMOL/L (ref 135–145)
SPECIMEN EXP DATE BLD: NORMAL

## 2025-03-12 PROCEDURE — 250N000011 HC RX IP 250 OP 636: Performed by: COLON & RECTAL SURGERY

## 2025-03-12 PROCEDURE — 86850 RBC ANTIBODY SCREEN: CPT | Performed by: ANESTHESIOLOGY

## 2025-03-12 PROCEDURE — 250N000025 HC SEVOFLURANE, PER MIN: Performed by: COLON & RECTAL SURGERY

## 2025-03-12 PROCEDURE — 258N000003 HC RX IP 258 OP 636: Performed by: ANESTHESIOLOGY

## 2025-03-12 PROCEDURE — 0DJD8ZZ INSPECTION OF LOWER INTESTINAL TRACT, VIA NATURAL OR ARTIFICIAL OPENING ENDOSCOPIC: ICD-10-PCS | Performed by: COLON & RECTAL SURGERY

## 2025-03-12 PROCEDURE — 272N000001 HC OR GENERAL SUPPLY STERILE: Performed by: COLON & RECTAL SURGERY

## 2025-03-12 PROCEDURE — 360N000080 HC SURGERY LEVEL 7, PER MIN: Performed by: COLON & RECTAL SURGERY

## 2025-03-12 PROCEDURE — 250N000011 HC RX IP 250 OP 636: Performed by: ANESTHESIOLOGY

## 2025-03-12 PROCEDURE — 88307 TISSUE EXAM BY PATHOLOGIST: CPT | Mod: 26 | Performed by: PATHOLOGY

## 2025-03-12 PROCEDURE — 999N000141 HC STATISTIC PRE-PROCEDURE NURSING ASSESSMENT: Performed by: COLON & RECTAL SURGERY

## 2025-03-12 PROCEDURE — 250N000011 HC RX IP 250 OP 636: Performed by: NURSE ANESTHETIST, CERTIFIED REGISTERED

## 2025-03-12 PROCEDURE — 250N000009 HC RX 250: Performed by: COLON & RECTAL SURGERY

## 2025-03-12 PROCEDURE — 8E0W4CZ ROBOTIC ASSISTED PROCEDURE OF TRUNK REGION, PERCUTANEOUS ENDOSCOPIC APPROACH: ICD-10-PCS | Performed by: COLON & RECTAL SURGERY

## 2025-03-12 PROCEDURE — 250N000009 HC RX 250: Performed by: NURSE ANESTHETIST, CERTIFIED REGISTERED

## 2025-03-12 PROCEDURE — 258N000001 HC RX 258: Performed by: COLON & RECTAL SURGERY

## 2025-03-12 PROCEDURE — 258N000003 HC RX IP 258 OP 636: Performed by: NURSE ANESTHETIST, CERTIFIED REGISTERED

## 2025-03-12 PROCEDURE — 258N000003 HC RX IP 258 OP 636: Performed by: COLON & RECTAL SURGERY

## 2025-03-12 PROCEDURE — 81025 URINE PREGNANCY TEST: CPT | Performed by: ANESTHESIOLOGY

## 2025-03-12 PROCEDURE — 86900 BLOOD TYPING SEROLOGIC ABO: CPT | Performed by: ANESTHESIOLOGY

## 2025-03-12 PROCEDURE — 85018 HEMOGLOBIN: CPT | Performed by: COLON & RECTAL SURGERY

## 2025-03-12 PROCEDURE — 80048 BASIC METABOLIC PNL TOTAL CA: CPT | Performed by: ANESTHESIOLOGY

## 2025-03-12 PROCEDURE — 82374 ASSAY BLOOD CARBON DIOXIDE: CPT | Performed by: ANESTHESIOLOGY

## 2025-03-12 PROCEDURE — 120N000001 HC R&B MED SURG/OB

## 2025-03-12 PROCEDURE — 370N000017 HC ANESTHESIA TECHNICAL FEE, PER MIN: Performed by: COLON & RECTAL SURGERY

## 2025-03-12 PROCEDURE — 0DTN4ZZ RESECTION OF SIGMOID COLON, PERCUTANEOUS ENDOSCOPIC APPROACH: ICD-10-PCS | Performed by: COLON & RECTAL SURGERY

## 2025-03-12 PROCEDURE — 250N000013 HC RX MED GY IP 250 OP 250 PS 637: Performed by: COLON & RECTAL SURGERY

## 2025-03-12 PROCEDURE — 4A1BXSH MONITORING OF GASTROINTESTINAL VASCULAR PERFUSION USING INDOCYANINE GREEN DYE, EXTERNAL APPROACH: ICD-10-PCS | Performed by: COLON & RECTAL SURGERY

## 2025-03-12 PROCEDURE — 710N000009 HC RECOVERY PHASE 1, LEVEL 1, PER MIN: Performed by: COLON & RECTAL SURGERY

## 2025-03-12 PROCEDURE — 88307 TISSUE EXAM BY PATHOLOGIST: CPT | Mod: TC | Performed by: COLON & RECTAL SURGERY

## 2025-03-12 RX ORDER — NALOXONE HYDROCHLORIDE 0.4 MG/ML
0.2 INJECTION, SOLUTION INTRAMUSCULAR; INTRAVENOUS; SUBCUTANEOUS
Status: DISCONTINUED | OUTPATIENT
Start: 2025-03-12 | End: 2025-03-15 | Stop reason: HOSPADM

## 2025-03-12 RX ORDER — ENOXAPARIN SODIUM 100 MG/ML
40 INJECTION SUBCUTANEOUS EVERY 24 HOURS
Status: DISCONTINUED | OUTPATIENT
Start: 2025-03-13 | End: 2025-03-15 | Stop reason: HOSPADM

## 2025-03-12 RX ORDER — PROPOFOL 10 MG/ML
INJECTION, EMULSION INTRAVENOUS CONTINUOUS PRN
Status: DISCONTINUED | OUTPATIENT
Start: 2025-03-12 | End: 2025-03-12

## 2025-03-12 RX ORDER — DEXAMETHASONE SODIUM PHOSPHATE 4 MG/ML
4 INJECTION, SOLUTION INTRA-ARTICULAR; INTRALESIONAL; INTRAMUSCULAR; INTRAVENOUS; SOFT TISSUE
Status: DISCONTINUED | OUTPATIENT
Start: 2025-03-12 | End: 2025-03-12 | Stop reason: HOSPADM

## 2025-03-12 RX ORDER — ONDANSETRON 4 MG/1
4 TABLET, ORALLY DISINTEGRATING ORAL EVERY 6 HOURS PRN
Status: DISCONTINUED | OUTPATIENT
Start: 2025-03-12 | End: 2025-03-15 | Stop reason: HOSPADM

## 2025-03-12 RX ORDER — METRONIDAZOLE 500 MG/100ML
500 INJECTION, SOLUTION INTRAVENOUS EVERY 12 HOURS
Status: COMPLETED | OUTPATIENT
Start: 2025-03-12 | End: 2025-03-13

## 2025-03-12 RX ORDER — HYDROMORPHONE HCL IN WATER/PF 6 MG/30 ML
0.2 PATIENT CONTROLLED ANALGESIA SYRINGE INTRAVENOUS EVERY 5 MIN PRN
Status: DISCONTINUED | OUTPATIENT
Start: 2025-03-12 | End: 2025-03-12 | Stop reason: HOSPADM

## 2025-03-12 RX ORDER — ALBUTEROL SULFATE 90 UG/1
2 INHALANT RESPIRATORY (INHALATION) EVERY 4 HOURS PRN
Status: DISCONTINUED | OUTPATIENT
Start: 2025-03-12 | End: 2025-03-15 | Stop reason: HOSPADM

## 2025-03-12 RX ORDER — NALOXONE HYDROCHLORIDE 0.4 MG/ML
0.1 INJECTION, SOLUTION INTRAMUSCULAR; INTRAVENOUS; SUBCUTANEOUS
Status: DISCONTINUED | OUTPATIENT
Start: 2025-03-12 | End: 2025-03-12 | Stop reason: HOSPADM

## 2025-03-12 RX ORDER — FENTANYL CITRATE 50 UG/ML
INJECTION, SOLUTION INTRAMUSCULAR; INTRAVENOUS PRN
Status: DISCONTINUED | OUTPATIENT
Start: 2025-03-12 | End: 2025-03-12

## 2025-03-12 RX ORDER — CIPROFLOXACIN 2 MG/ML
400 INJECTION, SOLUTION INTRAVENOUS EVERY 12 HOURS
Status: COMPLETED | OUTPATIENT
Start: 2025-03-12 | End: 2025-03-13

## 2025-03-12 RX ORDER — PROCHLORPERAZINE MALEATE 10 MG
10 TABLET ORAL EVERY 6 HOURS PRN
Status: DISCONTINUED | OUTPATIENT
Start: 2025-03-12 | End: 2025-03-15 | Stop reason: HOSPADM

## 2025-03-12 RX ORDER — OXYCODONE HYDROCHLORIDE 5 MG/1
10 TABLET ORAL EVERY 4 HOURS PRN
Status: DISCONTINUED | OUTPATIENT
Start: 2025-03-12 | End: 2025-03-15 | Stop reason: HOSPADM

## 2025-03-12 RX ORDER — METRONIDAZOLE 500 MG/100ML
500 INJECTION, SOLUTION INTRAVENOUS
Status: COMPLETED | OUTPATIENT
Start: 2025-03-12 | End: 2025-03-12

## 2025-03-12 RX ORDER — METHOCARBAMOL 500 MG/1
500 TABLET, FILM COATED ORAL 4 TIMES DAILY PRN
Status: DISCONTINUED | OUTPATIENT
Start: 2025-03-12 | End: 2025-03-14

## 2025-03-12 RX ORDER — FENTANYL CITRATE 50 UG/ML
50 INJECTION, SOLUTION INTRAMUSCULAR; INTRAVENOUS EVERY 5 MIN PRN
Status: DISCONTINUED | OUTPATIENT
Start: 2025-03-12 | End: 2025-03-12 | Stop reason: HOSPADM

## 2025-03-12 RX ORDER — HYDROMORPHONE HCL IN WATER/PF 6 MG/30 ML
0.4 PATIENT CONTROLLED ANALGESIA SYRINGE INTRAVENOUS
Status: DISCONTINUED | OUTPATIENT
Start: 2025-03-12 | End: 2025-03-15 | Stop reason: HOSPADM

## 2025-03-12 RX ORDER — LABETALOL HYDROCHLORIDE 5 MG/ML
10 INJECTION, SOLUTION INTRAVENOUS
Status: DISCONTINUED | OUTPATIENT
Start: 2025-03-12 | End: 2025-03-12 | Stop reason: HOSPADM

## 2025-03-12 RX ORDER — FENTANYL CITRATE 50 UG/ML
25 INJECTION, SOLUTION INTRAMUSCULAR; INTRAVENOUS EVERY 5 MIN PRN
Status: DISCONTINUED | OUTPATIENT
Start: 2025-03-12 | End: 2025-03-12 | Stop reason: HOSPADM

## 2025-03-12 RX ORDER — ONDANSETRON 2 MG/ML
4 INJECTION INTRAMUSCULAR; INTRAVENOUS EVERY 30 MIN PRN
Status: DISCONTINUED | OUTPATIENT
Start: 2025-03-12 | End: 2025-03-12 | Stop reason: HOSPADM

## 2025-03-12 RX ORDER — ONDANSETRON 2 MG/ML
4 INJECTION INTRAMUSCULAR; INTRAVENOUS ONCE
Status: COMPLETED | OUTPATIENT
Start: 2025-03-12 | End: 2025-03-12

## 2025-03-12 RX ORDER — CETIRIZINE HYDROCHLORIDE 10 MG/1
10 TABLET ORAL DAILY
Status: DISCONTINUED | OUTPATIENT
Start: 2025-03-12 | End: 2025-03-15 | Stop reason: HOSPADM

## 2025-03-12 RX ORDER — LIDOCAINE HYDROCHLORIDE 20 MG/ML
INJECTION, SOLUTION INFILTRATION; PERINEURAL PRN
Status: DISCONTINUED | OUTPATIENT
Start: 2025-03-12 | End: 2025-03-12

## 2025-03-12 RX ORDER — INDOCYANINE GREEN AND WATER 25 MG
KIT INJECTION PRN
Status: DISCONTINUED | OUTPATIENT
Start: 2025-03-12 | End: 2025-03-12

## 2025-03-12 RX ORDER — NALOXONE HYDROCHLORIDE 0.4 MG/ML
0.4 INJECTION, SOLUTION INTRAMUSCULAR; INTRAVENOUS; SUBCUTANEOUS
Status: DISCONTINUED | OUTPATIENT
Start: 2025-03-12 | End: 2025-03-15 | Stop reason: HOSPADM

## 2025-03-12 RX ORDER — SODIUM CHLORIDE, SODIUM LACTATE, POTASSIUM CHLORIDE, CALCIUM CHLORIDE 600; 310; 30; 20 MG/100ML; MG/100ML; MG/100ML; MG/100ML
INJECTION, SOLUTION INTRAVENOUS CONTINUOUS
Status: DISCONTINUED | OUTPATIENT
Start: 2025-03-12 | End: 2025-03-12 | Stop reason: HOSPADM

## 2025-03-12 RX ORDER — HYDROMORPHONE HCL IN WATER/PF 6 MG/30 ML
0.2 PATIENT CONTROLLED ANALGESIA SYRINGE INTRAVENOUS
Status: DISCONTINUED | OUTPATIENT
Start: 2025-03-12 | End: 2025-03-15 | Stop reason: HOSPADM

## 2025-03-12 RX ORDER — HEPARIN SODIUM 5000 [USP'U]/.5ML
5000 INJECTION, SOLUTION INTRAVENOUS; SUBCUTANEOUS
Status: COMPLETED | OUTPATIENT
Start: 2025-03-12 | End: 2025-03-12

## 2025-03-12 RX ORDER — ACETAMINOPHEN 325 MG/1
975 TABLET ORAL ONCE
Status: COMPLETED | OUTPATIENT
Start: 2025-03-12 | End: 2025-03-12

## 2025-03-12 RX ORDER — BUPIVACAINE HYDROCHLORIDE 5 MG/ML
INJECTION, SOLUTION PERINEURAL PRN
Status: DISCONTINUED | OUTPATIENT
Start: 2025-03-12 | End: 2025-03-12 | Stop reason: HOSPADM

## 2025-03-12 RX ORDER — MAGNESIUM HYDROXIDE 1200 MG/15ML
LIQUID ORAL PRN
Status: DISCONTINUED | OUTPATIENT
Start: 2025-03-12 | End: 2025-03-12 | Stop reason: HOSPADM

## 2025-03-12 RX ORDER — ONDANSETRON 2 MG/ML
INJECTION INTRAMUSCULAR; INTRAVENOUS PRN
Status: DISCONTINUED | OUTPATIENT
Start: 2025-03-12 | End: 2025-03-12

## 2025-03-12 RX ORDER — GLYCOPYRROLATE 0.2 MG/ML
INJECTION, SOLUTION INTRAMUSCULAR; INTRAVENOUS PRN
Status: DISCONTINUED | OUTPATIENT
Start: 2025-03-12 | End: 2025-03-12

## 2025-03-12 RX ORDER — SODIUM CHLORIDE, SODIUM LACTATE, POTASSIUM CHLORIDE, CALCIUM CHLORIDE 600; 310; 30; 20 MG/100ML; MG/100ML; MG/100ML; MG/100ML
INJECTION, SOLUTION INTRAVENOUS CONTINUOUS
Status: DISCONTINUED | OUTPATIENT
Start: 2025-03-12 | End: 2025-03-13

## 2025-03-12 RX ORDER — CEFAZOLIN SODIUM/WATER 2 G/20 ML
2 SYRINGE (ML) INTRAVENOUS
Status: COMPLETED | OUTPATIENT
Start: 2025-03-12 | End: 2025-03-12

## 2025-03-12 RX ORDER — ONDANSETRON 4 MG/1
4 TABLET, ORALLY DISINTEGRATING ORAL EVERY 30 MIN PRN
Status: DISCONTINUED | OUTPATIENT
Start: 2025-03-12 | End: 2025-03-12 | Stop reason: HOSPADM

## 2025-03-12 RX ORDER — CEFAZOLIN SODIUM/WATER 2 G/20 ML
2 SYRINGE (ML) INTRAVENOUS SEE ADMIN INSTRUCTIONS
Status: DISCONTINUED | OUTPATIENT
Start: 2025-03-12 | End: 2025-03-12 | Stop reason: HOSPADM

## 2025-03-12 RX ORDER — CETIRIZINE HYDROCHLORIDE 10 MG/1
10 TABLET ORAL DAILY
Status: DISCONTINUED | OUTPATIENT
Start: 2025-03-13 | End: 2025-03-12

## 2025-03-12 RX ORDER — ONDANSETRON 2 MG/ML
4 INJECTION INTRAMUSCULAR; INTRAVENOUS EVERY 6 HOURS PRN
Status: DISCONTINUED | OUTPATIENT
Start: 2025-03-12 | End: 2025-03-15 | Stop reason: HOSPADM

## 2025-03-12 RX ORDER — DEXAMETHASONE SODIUM PHOSPHATE 4 MG/ML
INJECTION, SOLUTION INTRA-ARTICULAR; INTRALESIONAL; INTRAMUSCULAR; INTRAVENOUS; SOFT TISSUE PRN
Status: DISCONTINUED | OUTPATIENT
Start: 2025-03-12 | End: 2025-03-12

## 2025-03-12 RX ORDER — PROPOFOL 10 MG/ML
INJECTION, EMULSION INTRAVENOUS PRN
Status: DISCONTINUED | OUTPATIENT
Start: 2025-03-12 | End: 2025-03-12

## 2025-03-12 RX ORDER — OXYCODONE HYDROCHLORIDE 5 MG/1
5 TABLET ORAL EVERY 4 HOURS PRN
Status: DISCONTINUED | OUTPATIENT
Start: 2025-03-12 | End: 2025-03-15 | Stop reason: HOSPADM

## 2025-03-12 RX ORDER — LIDOCAINE 40 MG/G
CREAM TOPICAL
Status: DISCONTINUED | OUTPATIENT
Start: 2025-03-12 | End: 2025-03-12 | Stop reason: HOSPADM

## 2025-03-12 RX ORDER — HYDROMORPHONE HCL IN WATER/PF 6 MG/30 ML
0.4 PATIENT CONTROLLED ANALGESIA SYRINGE INTRAVENOUS EVERY 5 MIN PRN
Status: DISCONTINUED | OUTPATIENT
Start: 2025-03-12 | End: 2025-03-12 | Stop reason: HOSPADM

## 2025-03-12 RX ORDER — LIDOCAINE 40 MG/G
CREAM TOPICAL
Status: DISCONTINUED | OUTPATIENT
Start: 2025-03-12 | End: 2025-03-15 | Stop reason: HOSPADM

## 2025-03-12 RX ADMIN — HYDROMORPHONE HYDROCHLORIDE 0.5 MG: 1 INJECTION, SOLUTION INTRAMUSCULAR; INTRAVENOUS; SUBCUTANEOUS at 15:33

## 2025-03-12 RX ADMIN — ONDANSETRON 4 MG: 2 INJECTION INTRAMUSCULAR; INTRAVENOUS at 14:53

## 2025-03-12 RX ADMIN — ROCURONIUM BROMIDE 50 MG: 50 INJECTION, SOLUTION INTRAVENOUS at 12:36

## 2025-03-12 RX ADMIN — METRONIDAZOLE 500 MG: 500 INJECTION, SOLUTION INTRAVENOUS at 10:16

## 2025-03-12 RX ADMIN — HYDROMORPHONE HYDROCHLORIDE 0.5 MG: 1 INJECTION, SOLUTION INTRAMUSCULAR; INTRAVENOUS; SUBCUTANEOUS at 12:59

## 2025-03-12 RX ADMIN — GLYCOPYRROLATE 0.2 MG: 0.2 INJECTION, SOLUTION INTRAMUSCULAR; INTRAVENOUS at 12:27

## 2025-03-12 RX ADMIN — INDOCYANINE GREEN AND WATER 10 MG: KIT at 15:05

## 2025-03-12 RX ADMIN — HYDROMORPHONE HYDROCHLORIDE 0.4 MG: 0.2 INJECTION, SOLUTION INTRAMUSCULAR; INTRAVENOUS; SUBCUTANEOUS at 23:02

## 2025-03-12 RX ADMIN — HEPARIN SODIUM 5000 UNITS: 5000 INJECTION, SOLUTION INTRAVENOUS; SUBCUTANEOUS at 10:14

## 2025-03-12 RX ADMIN — CIPROFLOXACIN 400 MG: 2 INJECTION, SOLUTION INTRAVENOUS at 22:34

## 2025-03-12 RX ADMIN — SODIUM CHLORIDE, POTASSIUM CHLORIDE, SODIUM LACTATE AND CALCIUM CHLORIDE: 600; 310; 30; 20 INJECTION, SOLUTION INTRAVENOUS at 14:35

## 2025-03-12 RX ADMIN — PHENYLEPHRINE HYDROCHLORIDE 100 MCG: 10 INJECTION INTRAVENOUS at 13:12

## 2025-03-12 RX ADMIN — LIDOCAINE HYDROCHLORIDE 100 MG: 20 INJECTION, SOLUTION INFILTRATION; PERINEURAL at 12:36

## 2025-03-12 RX ADMIN — ROCURONIUM BROMIDE 10 MG: 50 INJECTION, SOLUTION INTRAVENOUS at 13:07

## 2025-03-12 RX ADMIN — ROCURONIUM BROMIDE 20 MG: 50 INJECTION, SOLUTION INTRAVENOUS at 14:02

## 2025-03-12 RX ADMIN — METRONIDAZOLE 500 MG: 500 INJECTION, SOLUTION INTRAVENOUS at 22:06

## 2025-03-12 RX ADMIN — ACETAMINOPHEN 975 MG: 325 TABLET, FILM COATED ORAL at 10:11

## 2025-03-12 RX ADMIN — Medication 200 MG: at 15:34

## 2025-03-12 RX ADMIN — SODIUM CHLORIDE, SODIUM LACTATE, POTASSIUM CHLORIDE, AND CALCIUM CHLORIDE: .6; .31; .03; .02 INJECTION, SOLUTION INTRAVENOUS at 20:53

## 2025-03-12 RX ADMIN — INDOCYANINE GREEN AND WATER 10 MG: KIT at 14:35

## 2025-03-12 RX ADMIN — FENTANYL CITRATE 25 MCG: 50 INJECTION, SOLUTION INTRAMUSCULAR; INTRAVENOUS at 16:37

## 2025-03-12 RX ADMIN — ONDANSETRON 4 MG: 2 INJECTION, SOLUTION INTRAMUSCULAR; INTRAVENOUS at 10:12

## 2025-03-12 RX ADMIN — PROPOFOL 200 MG: 10 INJECTION, EMULSION INTRAVENOUS at 12:36

## 2025-03-12 RX ADMIN — PHENYLEPHRINE HYDROCHLORIDE 0.3 MCG/KG/MIN: 10 INJECTION INTRAVENOUS at 13:16

## 2025-03-12 RX ADMIN — HYDROMORPHONE HYDROCHLORIDE 0.4 MG: 0.2 INJECTION, SOLUTION INTRAMUSCULAR; INTRAVENOUS; SUBCUTANEOUS at 18:36

## 2025-03-12 RX ADMIN — PHENYLEPHRINE HYDROCHLORIDE 50 MCG: 10 INJECTION INTRAVENOUS at 13:15

## 2025-03-12 RX ADMIN — SODIUM CHLORIDE, POTASSIUM CHLORIDE, SODIUM LACTATE AND CALCIUM CHLORIDE: 600; 310; 30; 20 INJECTION, SOLUTION INTRAVENOUS at 10:16

## 2025-03-12 RX ADMIN — PROPOFOL 50 MCG/KG/MIN: 10 INJECTION, EMULSION INTRAVENOUS at 12:36

## 2025-03-12 RX ADMIN — METHOCARBAMOL 500 MG: 500 TABLET ORAL at 23:59

## 2025-03-12 RX ADMIN — DEXAMETHASONE SODIUM PHOSPHATE 4 MG: 4 INJECTION, SOLUTION INTRA-ARTICULAR; INTRALESIONAL; INTRAMUSCULAR; INTRAVENOUS; SOFT TISSUE at 12:40

## 2025-03-12 RX ADMIN — FENTANYL CITRATE 25 MCG: 50 INJECTION, SOLUTION INTRAMUSCULAR; INTRAVENOUS at 16:01

## 2025-03-12 RX ADMIN — MIDAZOLAM 2 MG: 1 INJECTION INTRAMUSCULAR; INTRAVENOUS at 12:27

## 2025-03-12 RX ADMIN — Medication 2 G: at 12:36

## 2025-03-12 RX ADMIN — ROCURONIUM BROMIDE 20 MG: 50 INJECTION, SOLUTION INTRAVENOUS at 14:23

## 2025-03-12 RX ADMIN — FENTANYL CITRATE 50 MCG: 50 INJECTION, SOLUTION INTRAMUSCULAR; INTRAVENOUS at 17:00

## 2025-03-12 RX ADMIN — FENTANYL CITRATE 100 MCG: 50 INJECTION INTRAMUSCULAR; INTRAVENOUS at 12:36

## 2025-03-12 RX ADMIN — HYDROMORPHONE HYDROCHLORIDE 0.2 MG: 0.2 INJECTION, SOLUTION INTRAMUSCULAR; INTRAVENOUS; SUBCUTANEOUS at 20:53

## 2025-03-12 RX ADMIN — SODIUM CHLORIDE, POTASSIUM CHLORIDE, SODIUM LACTATE AND CALCIUM CHLORIDE: 600; 310; 30; 20 INJECTION, SOLUTION INTRAVENOUS at 13:26

## 2025-03-12 RX ADMIN — CETIRIZINE HYDROCHLORIDE 10 MG: 10 TABLET, FILM COATED ORAL at 22:13

## 2025-03-12 RX ADMIN — GLYCOPYRROLATE 0.2 MG: 0.2 INJECTION, SOLUTION INTRAMUSCULAR; INTRAVENOUS at 15:36

## 2025-03-12 ASSESSMENT — ACTIVITIES OF DAILY LIVING (ADL)
ADLS_ACUITY_SCORE: 16
TOILETING_ISSUES: NO
HEARING_DIFFICULTY_OR_DEAF: NO
ADLS_ACUITY_SCORE: 16
DIFFICULTY_COMMUNICATING: NO
DIFFICULTY_COMMUNICATING: NO
ADLS_ACUITY_SCORE: 16
ADLS_ACUITY_SCORE: 16
CHANGE_IN_FUNCTIONAL_STATUS_SINCE_ONSET_OF_CURRENT_ILLNESS/INJURY: NO
DRESSING/BATHING_DIFFICULTY: NO
ADLS_ACUITY_SCORE: 23
ADLS_ACUITY_SCORE: 16
FALL_HISTORY_WITHIN_LAST_SIX_MONTHS: NO
ADLS_ACUITY_SCORE: 16
ADLS_ACUITY_SCORE: 16
WEAR_GLASSES_OR_BLIND: NO
ADLS_ACUITY_SCORE: 16
ADLS_ACUITY_SCORE: 23
HEARING_DIFFICULTY_OR_DEAF: NO
ADLS_ACUITY_SCORE: 16
DIFFICULTY_EATING/SWALLOWING: NO
CONCENTRATING,_REMEMBERING_OR_MAKING_DECISIONS_DIFFICULTY: NO
ADLS_ACUITY_SCORE: 23
WALKING_OR_CLIMBING_STAIRS_DIFFICULTY: NO
ADLS_ACUITY_SCORE: 16
DOING_ERRANDS_INDEPENDENTLY_DIFFICULTY: NO
ADLS_ACUITY_SCORE: 16

## 2025-03-12 ASSESSMENT — ENCOUNTER SYMPTOMS
SEIZURES: 0
DYSRHYTHMIAS: 0

## 2025-03-12 ASSESSMENT — LIFESTYLE VARIABLES: TOBACCO_USE: 1

## 2025-03-12 NOTE — OP NOTE
Operative Report    Pre Operative Diagnosis:  1)  Recurrent Sigmoid Diverticulitis       Post Operative Diagnosis  1) Same    Surgery:  Robotic sigmoid colectomy  Intraoperative fluorescence angiography  Colorectal anastomosis  Rigid proctoscopy    Surgeon: Laura Gudino MD  Assistant: Terrie Osborne PA-C    A skilled first assistant was necessary due to the technical complexity of the procedure and for the patient's safety.  The assistant helped me with positioning, retraction, visualization of the operative field, meticulous wound closure and moreover helped to complete the procedures in a technically safe and efficient manner.        Second Assistant: Manjinder Neal MD (Colorectal Surgery Fellow)    Anesthesia: General    EBL:  10 mL    Findings: The sigmoid colon was thickened and firm consistent with diverticular disease.  There was no evidence of abscess or perforation.  There were adhesions of the sigmoid colon to the left pelvic side wall and anterior abdominal wall.  I performed a lateral to medial mobilization of the sigmoid colon.  The distal transection was within the upper rectum and we created an end to end stapled anastomosis.  The anastomosis was created with excellent blood supply and without tension at 15 cm from the anal verge.  A pneumatic leak test of the anastomosis was negative for any extravasation of air.      Indication: Andressa Lazar is a 44 year old female who has a history of recurrent sigmoid diverticulitis.  She has a history of multiple attacks documented by CT scan.  She was advised of the risks and benefits or surgery.  The risks discussed include but are not limited to the risk of bleeding, anastomotic leak, wound infection, injury to adjacent structures, development of a hernia, need for further surgery including creating a stoma, and even more serious complications.  We discussed expectations regarding recovery.  She wishes to proceed.    Description of procedure:  After obtaining  informed consent the patient was brought to the operating room after a complete bowel prep.  She was placed on the operating table and general anesthesia was induced.  She was intubated by the anesthesia service without difficulty.      Coon catheter was placed under sterile conditions.  An orogastric tube was placed.  The patient's arms were tucked at his side.  She was secured to the table with tape across the chest.  She was placed in the low modified lithotomy position.  All pressure points were inspected and padded appropriately.  The abdomen was shaved prepped and draped in the usual sterile fashion.  A timeout was undertaken which identified the patient and the correct procedure.    We began by making a stab incision in the left upper quadrant of the abdomen.  A Veres needle was used to establish pneumoperitoneum.  A small incision was made in the supraumbilical position.  An 8 mm robotic trocar was placed here without difficulty.  We then surveyed the abdomen with 30 degree angled robotic camera.  There is no evidence of injury to the underlying viscera and the Veres needle was removed.  The abdomen was surveyed and there is no evidence of other pathology.  We then performed a laparoscopic-assisted TAP block utilizing 30 mL of half percent Marcaine plain.  This was injected in four aliquots; one for each quadrant of the abdomen.    We placed 2 additional 8 mm trochars on the left side of the abdomen running in the line across the abdomen transversely.  We placed an additional 8 mm robotic trocar in the right lower quadrant and a 5 mm air seal trocar in the right upper quadrant.  The air seal device was used to maintain pneumoperitoneum throughout the case.    We then placed the patient in steep Trendelenburg position.  At this point we docked the robot over the patient's left side and centered the robot on the inferior mesenteric artery.  We utilized a tip up grasper in arm #1 fenestrated bipolar in arm #2  the camera in arm #3 and a monopolar scissors in arm #4.  Arm #4 was switched out to the vessel sealing device and a robotic stapler as needed.  With this instrumentation we performed a mobilization of the colon.    We performed a lateral to medial mobilization by the utilizing the monopolar scissors to incise the lateral attachments of the colon to the pelvic sidewall incising along the white line at Toldt and elevating the colon and its mesentery off the retroperitoneum the left ureter was identified and swept posteriorly and kept out of harm's way.  The mobilization was taken from the sacral promontory up to the level of the splenic flexure.  Once this was accomplished we incised the peritoneum medial to the inferior mesenteric artery and it was elevated off the retroperitoneum the left ureter was again identified and the inferior mesenteric artery was isolated.  It was triply ligated with the vessel sealing device and transected with excellent hemostasis.  The adjacent bare area of the mesentery was then transected with the vessel sealing device as well.    We then turned our attention back to the pelvis.  At this time a point for distal transection was chosen.  The upper rectum was mobilized by incising the areolar attachments of the mesial rectum to the presacral fascia.  The peritoneum along the upper rectum was incised to allow mobilization of the upper rectum.  We then incised the peritoneum of the upper mesorectum up to the level of the bowel wall at the point chosen for distal transection.  The mesorectum here was then transected with the vessel sealing device to circumferentially isolate the rectum at this level.  We then transected the bowel at this level with two firings of a 30 mm blue load robotic CHRISTOPHER stapler.    We then turned our attention to our proximal point of transection.  The mesocolon was radially ligated with the vessel sealing device proximal to the divided inferior mesenteric artery  "pedicle up to the colon wall.  We then evaluated perfusion to our proposed colonic conduit.  Four mL of dilute indocyanine green was injected intravenously by the anesthesia team.  We utilized the Firefly technology on the robotic laparoscope to identify blood flow.  We identified excellent perfusion of the bowel to the entire conduit.    At this point the robot instruments were removed and the robot was undocked.  The patient was placed in a more neutral position.  We used the robotic camera to identify our proximal staple line.  A grasper was placed on the staple line.  Pneumoperitoneum was evacuated.  We made a small Pfannenstiel incision approximately 3 fingerbreadths above the pubic symphysis dissection was taken down with electrocautery the fascia was incised in a transverse fashion and then elevated off the rectus muscles superiorly and inferiorly.  The muscles fibers of the rectus were spread in the midline and the peritoneum was incised with great care to avoid injury to the bladder.  An Phuc wound retractor was used to facilitate exposure.  This allowed us to extract the proximal staple line and the specimen through a Pfannenstiel incision. The bowel was then divided between Idris clamps.  Specimen was sent off the field labeled as \"sigmoid colon staple line distal\".      We then prepared the proximal bowel for anastomosis.  A 2-0 Prolene suture was then placed in a pursestring fashion around the cut edge of the bowel wall.  The anvil of a 29 mm EEA stapler was then secured within the lumen of the bowel and the suture was tied down to bring the bowel wall type to the shaft of the anvil.  Fat was cleared of the adjacent bowel wall close to the anvil and a second pursestring suture of the 2-0 Prolene was placed to secure the anvil.  This was then reintroduced into the abdomen.  Pneumoperitoneum was reestablished.    The patient was then again placed in Trendelenburg position.  We utilized the robotic " camera as a laparoscope.  The proximal bowel of the descending colon was identified and the anvil was identified.  Prior to creating our anastomosis, we assessed perfusion to our proximal colonic conduit with intraoperative fluorescence angiography.  Four mL of dilute indocyanine green was injected intravenously by the anesthesia team.  We utilized the Firefly technology on the robotic laparoscope to identify blood flow.  We identified excellent perfusion of the bowel to the entire conduit.      Next the small bowel was swept up out of the pelvis.  I went to the perineal field.  I placed sizers via the anus to the top of the rectal stump.  The EEA stapler was then placed via the anus to the top of the rectal stump.  Stapler was flush with the bowel wall here and the spike was deployed through the bowel wall just adjacent to the staple line in its midportion.  Utilizing laparoscopic instruments the anvil was  to the stapler, the stapler was closed and fired after ensuring that the proximal bowel was in proper orientation by inspecting the cut edge of the mesentery to its origin.  The stapler was easily retrieved.  The anastomotic rings were inspected and found to be intact.  The pelvis was filled with saline for a pneumatic leak test.  I performed a rigid proctoscopy and a pneumatic leak test was negative.    At this point pneumoperitoneum was evacuated.  The Phuc wound retractor was removed and the peritoneum was reapproximated with a running suture of 2-0 Vicryl.  The fascia was then reapproximated at the site with 2 running sutures of looped 0 PDS.  All skin wounds closed with 4-0 Monocryl in a subcuticular fashion after irrigation.  Dermabond was placed as a dressing.  The patient was placed in supine position, drapes were taken down, he was awakened extubated and transferred to the PACU in stable condition.  Lap, sponge, and needle counts correct at the end of the case x2.    SPECIMEN: sigmoid colon,  staple line distal.    Laura Gudino MD

## 2025-03-12 NOTE — BRIEF OP NOTE
[facility]    Brief Operative Note    Pre-operative diagnosis: Diverticulitis of large intestine with abscess [K57.20]  Post-operative diagnosis Same as pre-operative diagnosis    Procedure: Robotic Assisted Sigmoid Colectomy, N/A - Abdomen    Surgeon: Surgeons and Role:     * Estefany Gudino MD - Primary  Anesthesia: General   Estimated Blood Loss: 10 mL from 3/12/2025 12:19 PM to 3/12/2025  3:46 PM      Drains: None  Specimens:   ID Type Source Tests Collected by Time Destination   1 : SIGMOID COLON; STAPLE LINE DISTAL Tissue Large Intestine, Colon, Sigmoid SURGICAL PATHOLOGY EXAM Estefany Gudino MD 3/12/2025  1:13 PM      Findings:   Diverticular disease of sigmoid colon.  Complications: None.  Implants: * No implants in log *        Condition on discharge from OR: Satisfactory    Terrie Osborne PA-C   [facility]   [phone].        ADDENDUM:    PATIENT DATA  Indicate Y or N:  Home O2 No  Hemodialysis  No  Transplant patient  No  Cirrhosis  No  Steroids in last 30 days  No  Immunomodulators in last 30 days  No  Anticoagulation at time of surgery  No  Prior abdominal surgery  No  Pelvic irradiation  No    Albumin within 30 days if known na   Hgb within 30 days if known 14   Hemoglobin   Date Value Ref Range Status   03/12/2025 14.0 11.7 - 15.7 g/dL Final   12/27/2020 15.0 11.7 - 15.7 g/dL Final   ]  Cr within 30 days if known 0.65   Creatinine   Date Value Ref Range Status   03/12/2025 0.65 0.51 - 0.95 mg/dL Final   12/27/2020 0.52 0.52 - 1.04 mg/dL Final   ]  Body mass index is 30.61 kg/m .      OR DATA  Emergent  No   <24 hours  No   <1 week  No  Bowel Prep Yes  Antibiotics  Yes  DVT prophylaxis    Heparin  Yes   SCD  Yes   None  No  Drain  No  ASA (1,2,3,4) 3  OR time (min) 157  Stents  No  Transfuse >/= 2U  No  Anastomosis   Stapled  Yes   Handsewn  No  Leak Test    Positive  No   Negative  Yes   Not done  No

## 2025-03-12 NOTE — ANESTHESIA POSTPROCEDURE EVALUATION
Patient: Andressa Lazar    Procedure: Procedure(s):  Robotic Assisted Sigmoid Colectomy       Anesthesia Type:  General    Note:  Disposition: Inpatient   Postop Pain Control: Uneventful            Sign Out: Well controlled pain   PONV: No   Neuro/Psych: Uneventful            Sign Out: Acceptable/Baseline neuro status   Airway/Respiratory: Uneventful            Sign Out: Acceptable/Baseline resp. status   CV/Hemodynamics: Uneventful            Sign Out: Acceptable CV status; No obvious hypovolemia; No obvious fluid overload   Other NRE: NONE   DID A NON-ROUTINE EVENT OCCUR?            Last vitals:  Vitals Value Taken Time   BP 97/72 03/12/25 1745   Temp 36.4  C (97.5  F) 03/12/25 1700   Pulse 64 03/12/25 1751   Resp 16 03/12/25 1751   SpO2 97 % 03/12/25 1751   Vitals shown include unfiled device data.    Electronically Signed By: Julia Powell  March 12, 2025  5:52 PM

## 2025-03-12 NOTE — ANESTHESIA CARE TRANSFER NOTE
Patient: Andressa Lazar    Procedure: Procedure(s):  Robotic Assisted Sigmoid Colectomy       Diagnosis: Diverticulitis of large intestine with abscess [K57.20]  Diagnosis Additional Information: No value filed.    Anesthesia Type:   General     Note:    Oropharynx: oropharynx clear of all foreign objects  Level of Consciousness: awake  Oxygen Supplementation: face mask  Level of Supplemental Oxygen (L/min / FiO2): 8  Independent Airway: airway patency satisfactory and stable  Dentition: dentition unchanged  Vital Signs Stable: post-procedure vital signs reviewed and stable  Report to RN Given: handoff report given  Patient transferred to: PACU    Handoff Report: Identifed the Patient, Identified the Reponsible Provider, Reviewed the pertinent medical history, Discussed the surgical course, Reviewed Intra-OP anesthesia mangement and issues during anesthesia, Set expectations for post-procedure period and Allowed opportunity for questions and acknowledgement of understanding      Vitals:  Vitals Value Taken Time   /67    Temp 36.8    Pulse 90 03/12/25 1551   Resp 26 03/12/25 1551   SpO2 100 % 03/12/25 1551   Vitals shown include unfiled device data.    Electronically Signed By: DARRYL Rivera CRNA  March 12, 2025  3:52 PM

## 2025-03-12 NOTE — ANESTHESIA PREPROCEDURE EVALUATION
Anesthesia Pre-Procedure Evaluation    Patient: Andressa Lazar   MRN: [id number] : 1980        Procedure : Procedure(s):  Robotic Assisted Sigmoid Colectomy, Possible Stoma          Past Medical History:   Diagnosis Date    Diabetes (H)     Gestational on insulin    Uncomplicated asthma     in the past      Past Surgical History:   Procedure Laterality Date    COLONOSCOPY      COLONOSCOPY N/A 3/11/2025    Procedure: Colonoscopy with biopsies and polypectomies by cold snare;  Surgeon: Estefany Gudino MD;  Location:  GI    COLONOSCOPY N/A 3/11/2025    Procedure: COLONOSCOPY, FLEXIBLE, WITH LESION REMOVAL USING SNARE;  Surgeon: Estefany Gudino MD;  Location:  GI    ENT SURGERY      rhinoplasty    ORTHOPEDIC SURGERY      ulnar nerve repair-right arm      No Known Allergies   Social History     Tobacco Use    Smoking status: Every Day     Current packs/day: 0.25     Average packs/day: 0.3 packs/day for 30.2 years (7.5 ttl pk-yrs)     Types: Cigarettes     Start date:     Smokeless tobacco: Never    Tobacco comments:     quit in early pregnancy   Substance Use Topics    Alcohol use: Never      Wt Readings from Last 1 Encounters:   25 81.7 kg (180 lb 3.2 oz)        Anesthesia Evaluation            ROS/MED HX  ENT/Pulmonary:     (+)                tobacco use, Current use,                    (-) asthma and sleep apnea   Neurologic:    (-) no seizures, no CVA and migraines   Cardiovascular:    (-) hypertension, CHF, arrhythmias, irregular heartbeat/palpitations and dyslipidemia   METS/Exercise Tolerance:     Hematologic:    (-) anemia and history of blood transfusion   Musculoskeletal:       GI/Hepatic: Comment: Diverticulitis of large intestine with abscess without bleeding      (-) GERD and liver disease   Renal/Genitourinary:    (-) renal disease   Endo:     (+)               Obesity,    (-) Type II DM and thyroid disease   Psychiatric/Substance Use:       Infectious Disease:      "  Malignancy:       Other:            Physical Exam    Airway        Mallampati: II   TM distance: > 3 FB   Neck ROM: full   Mouth opening: > 3 cm    Respiratory Devices and Support         Dental  no notable dental history     (+) Modest Abnormalities - crowns, retainers, 1 or 2 missing teeth    B=Bridge, C=Chipped, L=Loose, M=Missing    Cardiovascular   cardiovascular exam normal          Pulmonary   pulmonary exam normal        breath sounds clear to auscultation           OUTSIDE LABS:  CBC:   Lab Results   Component Value Date    WBC 7.5 02/26/2025    WBC 11.9 (H) 09/04/2024    HGB 14.1 02/26/2025    HGB 14.2 09/04/2024    HCT 40.7 02/26/2025    HCT 41.4 09/04/2024     02/26/2025     09/04/2024     BMP:   Lab Results   Component Value Date     09/04/2024     09/01/2024    POTASSIUM 4.0 09/04/2024    POTASSIUM 4.2 09/01/2024    CHLORIDE 104 09/04/2024    CHLORIDE 105 09/01/2024    CO2 21 (L) 09/04/2024    CO2 20 (L) 09/01/2024    BUN 7.0 09/04/2024    BUN 8.7 09/01/2024    CR 0.66 09/04/2024    CR 0.66 09/01/2024     (H) 09/04/2024     (H) 09/01/2024     COAGS: No results found for: \"PTT\", \"INR\", \"FIBR\"  POC:   Lab Results   Component Value Date    BGM 95 09/30/2019    HCG Negative 12/27/2020    HCGS Negative 09/04/2024     HEPATIC:   Lab Results   Component Value Date    ALBUMIN 4.3 09/04/2024    PROTTOTAL 6.5 09/04/2024    ALT 24 09/04/2024    AST 18 09/04/2024    ALKPHOS 63 09/04/2024    BILITOTAL 0.4 09/04/2024     OTHER:   Lab Results   Component Value Date    LACT 0.8 12/27/2020    A1C 5.2 09/28/2019    NATALY 9.0 09/04/2024    LIPASE 29 09/04/2024       Anesthesia Plan    ASA Status:  2    NPO Status:  NPO Appropriate    Anesthesia Type: General.     - Airway: ETT   Induction: Intravenous.   Maintenance: TIVA.   Techniques and Equipment:     - Airway: Video-Laryngoscope     - Lines/Monitors: 2nd IV     Consents    Anesthesia Plan(s) and associated risks, benefits, and " "realistic alternatives discussed. Questions answered and patient/representative(s) expressed understanding.     - Discussed:     - Discussed with:  Patient      - Extended Intubation/Ventilatory Support Discussed: No.      - Patient is DNR/DNI Status: No     Use of blood products discussed: Yes.     - Discussed with: Patient.     - Consented: consented to blood products            Reason for refusal: other.     Postoperative Care    Pain management: IV analgesics, Oral pain medications, Multi-modal analgesia.   PONV prophylaxis: Ondansetron (or other 5HT-3), Dexamethasone or Solumedrol, Background Propofol Infusion     Comments:               Frantz Sal, DO    I have reviewed the pertinent notes and labs in the chart from the past 30 days and (re)examined the patient.  Any updates or changes from those notes are reflected in this note.    Clinically Significant Risk Factors Present on Admission                             # Obesity: Estimated body mass index is 30.22 kg/m  as calculated from the following:    Height as of 2/26/25: 1.645 m (5' 4.75\").    Weight as of 2/26/25: 81.7 kg (180 lb 3.2 oz).                "

## 2025-03-12 NOTE — ANESTHESIA PROCEDURE NOTES
Airway         Procedure Start/Stop Times: 3/12/2025 12:39 PM  Staff -        Anesthesiologist:  Frantz Sal DO       CRNA: Tayla Ham APRN CRNA       Performed By: CRNA  Consent for Airway        Urgency: elective  Indications and Patient Condition       Indications for airway management: norah-procedural       Induction type:inhalational       Mask difficulty assessment: 1 - vent by mask    Final Airway Details       Final airway type: endotracheal airway       Successful airway: ETT - single  Endotracheal Airway Details        ETT size (mm): 7.0       Cuffed: yes       Successful intubation technique: video laryngoscopy       VL Blade Size: Cabrajal 3       Grade View of Cords: 1       Adjucts: stylet       Position: Left       Measured from: gums/teeth       Secured at (cm): 21       Bite block used: None    Post intubation assessment        Placement verified by: capnometry, equal breath sounds and chest rise        Number of attempts at approach: 1       Number of other approaches attempted: 0       Secured with: tape       Ease of procedure: easy       Dentition: Intact and Unchanged    Medication(s) Administered   Medication Administration Time: 3/12/2025 12:39 PM

## 2025-03-13 ENCOUNTER — DOCUMENTATION ONLY (OUTPATIENT)
Dept: OTHER | Facility: CLINIC | Age: 45
End: 2025-03-13
Payer: COMMERCIAL

## 2025-03-13 LAB
ANION GAP SERPL CALCULATED.3IONS-SCNC: 8 MMOL/L (ref 7–15)
BUN SERPL-MCNC: 4.5 MG/DL (ref 6–20)
CALCIUM SERPL-MCNC: 8.7 MG/DL (ref 8.8–10.4)
CHLORIDE SERPL-SCNC: 106 MMOL/L (ref 98–107)
CREAT SERPL-MCNC: 0.63 MG/DL (ref 0.51–0.95)
EGFRCR SERPLBLD CKD-EPI 2021: >90 ML/MIN/1.73M2
ERYTHROCYTE [DISTWIDTH] IN BLOOD BY AUTOMATED COUNT: 12.6 % (ref 10–15)
GLUCOSE BLDC GLUCOMTR-MCNC: 93 MG/DL (ref 70–99)
GLUCOSE SERPL-MCNC: 108 MG/DL (ref 70–99)
HCO3 SERPL-SCNC: 24 MMOL/L (ref 22–29)
HCT VFR BLD AUTO: 39 % (ref 35–47)
HGB BLD-MCNC: 13.5 G/DL (ref 11.7–15.7)
MAGNESIUM SERPL-MCNC: 1.8 MG/DL (ref 1.7–2.3)
MCH RBC QN AUTO: 30.3 PG (ref 26.5–33)
MCHC RBC AUTO-ENTMCNC: 34.6 G/DL (ref 31.5–36.5)
MCV RBC AUTO: 87 FL (ref 78–100)
PHOSPHATE SERPL-MCNC: 2.5 MG/DL (ref 2.5–4.5)
PLATELET # BLD AUTO: 188 10E3/UL (ref 150–450)
POTASSIUM SERPL-SCNC: 3.9 MMOL/L (ref 3.4–5.3)
RBC # BLD AUTO: 4.46 10E6/UL (ref 3.8–5.2)
SODIUM SERPL-SCNC: 138 MMOL/L (ref 135–145)
WBC # BLD AUTO: 11.3 10E3/UL (ref 4–11)

## 2025-03-13 PROCEDURE — 84100 ASSAY OF PHOSPHORUS: CPT | Performed by: COLON & RECTAL SURGERY

## 2025-03-13 PROCEDURE — 84295 ASSAY OF SERUM SODIUM: CPT | Performed by: COLON & RECTAL SURGERY

## 2025-03-13 PROCEDURE — 85018 HEMOGLOBIN: CPT | Performed by: COLON & RECTAL SURGERY

## 2025-03-13 PROCEDURE — 36415 COLL VENOUS BLD VENIPUNCTURE: CPT | Performed by: COLON & RECTAL SURGERY

## 2025-03-13 PROCEDURE — 80048 BASIC METABOLIC PNL TOTAL CA: CPT | Performed by: COLON & RECTAL SURGERY

## 2025-03-13 PROCEDURE — 120N000001 HC R&B MED SURG/OB

## 2025-03-13 PROCEDURE — 83735 ASSAY OF MAGNESIUM: CPT | Performed by: COLON & RECTAL SURGERY

## 2025-03-13 PROCEDURE — 250N000013 HC RX MED GY IP 250 OP 250 PS 637: Performed by: STUDENT IN AN ORGANIZED HEALTH CARE EDUCATION/TRAINING PROGRAM

## 2025-03-13 PROCEDURE — 250N000013 HC RX MED GY IP 250 OP 250 PS 637: Performed by: COLON & RECTAL SURGERY

## 2025-03-13 PROCEDURE — 85041 AUTOMATED RBC COUNT: CPT | Performed by: COLON & RECTAL SURGERY

## 2025-03-13 PROCEDURE — 250N000011 HC RX IP 250 OP 636: Mod: JZ | Performed by: COLON & RECTAL SURGERY

## 2025-03-13 RX ORDER — ACETAMINOPHEN 325 MG/1
975 TABLET ORAL 3 TIMES DAILY
Status: DISCONTINUED | OUTPATIENT
Start: 2025-03-13 | End: 2025-03-15 | Stop reason: HOSPADM

## 2025-03-13 RX ADMIN — METHOCARBAMOL 500 MG: 500 TABLET ORAL at 15:02

## 2025-03-13 RX ADMIN — OXYCODONE HYDROCHLORIDE 5 MG: 5 TABLET ORAL at 01:16

## 2025-03-13 RX ADMIN — ENOXAPARIN SODIUM 40 MG: 40 INJECTION SUBCUTANEOUS at 09:39

## 2025-03-13 RX ADMIN — METHOCARBAMOL 500 MG: 500 TABLET ORAL at 09:27

## 2025-03-13 RX ADMIN — HYDROMORPHONE HYDROCHLORIDE 0.4 MG: 0.2 INJECTION, SOLUTION INTRAMUSCULAR; INTRAVENOUS; SUBCUTANEOUS at 11:37

## 2025-03-13 RX ADMIN — OXYCODONE HYDROCHLORIDE 10 MG: 5 TABLET ORAL at 05:30

## 2025-03-13 RX ADMIN — METRONIDAZOLE 500 MG: 500 INJECTION, SOLUTION INTRAVENOUS at 09:32

## 2025-03-13 RX ADMIN — ACETAMINOPHEN 975 MG: 325 TABLET, FILM COATED ORAL at 21:55

## 2025-03-13 RX ADMIN — ACETAMINOPHEN 975 MG: 325 TABLET, FILM COATED ORAL at 14:50

## 2025-03-13 RX ADMIN — CIPROFLOXACIN 400 MG: 2 INJECTION, SOLUTION INTRAVENOUS at 13:53

## 2025-03-13 RX ADMIN — CETIRIZINE HYDROCHLORIDE 10 MG: 10 TABLET, FILM COATED ORAL at 09:27

## 2025-03-13 RX ADMIN — OXYCODONE HYDROCHLORIDE 10 MG: 5 TABLET ORAL at 19:42

## 2025-03-13 RX ADMIN — OXYCODONE HYDROCHLORIDE 10 MG: 5 TABLET ORAL at 14:59

## 2025-03-13 ASSESSMENT — ACTIVITIES OF DAILY LIVING (ADL)
ADLS_ACUITY_SCORE: 30
ADLS_ACUITY_SCORE: 22
ADLS_ACUITY_SCORE: 30
ADLS_ACUITY_SCORE: 22
ADLS_ACUITY_SCORE: 22
ADLS_ACUITY_SCORE: 30
ADLS_ACUITY_SCORE: 34
ADLS_ACUITY_SCORE: 30
ADLS_ACUITY_SCORE: 22
ADLS_ACUITY_SCORE: 20
ADLS_ACUITY_SCORE: 22
ADLS_ACUITY_SCORE: 30
ADLS_ACUITY_SCORE: 23
ADLS_ACUITY_SCORE: 22
ADLS_ACUITY_SCORE: 23
ADLS_ACUITY_SCORE: 22

## 2025-03-13 NOTE — PROGRESS NOTES
.Admission/Transfer from: PACU  2 RN skin assessment completed. Yes - done w/ Tanya Gray RN  Significant findings include: NA  WOC Nurse Consult Ordered? No

## 2025-03-13 NOTE — PROGRESS NOTES
COLON & RECTAL SURGERY  PROGRESS NOTE    03/13/2025  Post-op Day # 1    SUBJECTIVE:  Pain controlled. No nausea or vomiting. No flatus or BM.    OBJECTIVE:  Temp:  [97.3  F (36.3  C)-98.6  F (37  C)] 98.5  F (36.9  C)  Pulse:  [50-93] 75  Resp:  [13-25] 16  BP: ()/(58-89) 100/61  SpO2:  [92 %-100 %] 95 %    Intake/Output Summary (Last 24 hours) at 3/13/2025 0840  Last data filed at 3/13/2025 0530  Gross per 24 hour   Intake 4123 ml   Output 2660 ml   Net 1463 ml       GENERAL:  Awake, alert, no acute distress  HEAD: Normocephalic atraumatic  SCLERA: Anicteric  EXTREMITIES: Warm and well perfused  ABDOMEN:  Soft, appropriately tender, non-distended. No guarding, rigidity, or peritoneal signs.  INCISION:  C/d/i    LABS:  Lab Results   Component Value Date    WBC 11.3 03/13/2025    WBC 11.2 12/27/2020     Lab Results   Component Value Date    HGB 13.5 03/13/2025    HGB 15.0 12/27/2020     Lab Results   Component Value Date    HCT 39.0 03/13/2025    HCT 44.2 12/27/2020     Lab Results   Component Value Date     03/13/2025     12/27/2020     Last Basic Metabolic Panel:  Lab Results   Component Value Date     03/13/2025     12/27/2020      Lab Results   Component Value Date    POTASSIUM 3.9 03/13/2025    POTASSIUM 4.7 12/27/2020     Lab Results   Component Value Date    CHLORIDE 106 03/13/2025    CHLORIDE 109 12/27/2020     Lab Results   Component Value Date    NATALY 8.7 03/13/2025    NATALY 8.6 12/27/2020     Lab Results   Component Value Date    CO2 24 03/13/2025    CO2 25 12/27/2020     Lab Results   Component Value Date    BUN 4.5 03/13/2025    BUN 8 12/27/2020     Lab Results   Component Value Date    CR 0.63 03/13/2025    CR 0.52 12/27/2020     Lab Results   Component Value Date     03/13/2025    GLC 93 03/13/2025    GLC 84 12/27/2020       ASSESSMENT/PLAN: 44F POD#1 s/p robotic sigmoid colectomy for diverticular disease, recovering appropriately.    1. Low fiber diet  2. Multimodal  "pain control  3. Stop IVF  4. Remove Coon  5. Lovenox for ppx  6. OOB, ambulate  7. PT/OT     Discussed with Dr. Gudino.    For questions/paging, please contact the CRS office at [phone].    Manjinder Neal MD, MS  Fellow, Colon & Rectal Surgery  03/13/2025  8:40 AM          Col[facility] Rect[facility] Staff  I performed a history and physical examination of the patient and discussed their management with the physician assistant. I reviewed the physician assistants note and agree with the documented findings and plan of care.     Agree with above.  OVerall doing well.  Didn't sleep well.  Minimal pain. Tolerating diet.    Alert, NAD  Abd soft, ND, mild TTP per incisions    LFD  Coon out  OOB and ambulation     Clinically Significant Risk Factors Present on Admission          # Hyperchloremia: Highest Cl = 108 mmol/L in last 2 days, will monitor as appropriate                        # Obesity: Estimated body mass index is 30.61 kg/m  as calculated from the following:    Height as of this encounter: 1.626 m (5' 4\").    Weight as of this encounter: 80.9 kg (178 lb 4.8 oz).                Laura Gudino MD, FACS, FASCRS    [facility]  [address]  [place] [address]  T: [phone]  F: [phone]      "

## 2025-03-13 NOTE — PLAN OF CARE
Goal Outcome Evaluation:      Plan of Care Reviewed With: patient    Overall Patient Progress: improvingOverall Patient Progress: improving    Shift: PACU 8p-7a  Surgery/POD#: POD 1 from a robotic assisted sigmoid colectomy.  Behavior & Aggression: Green  Fall Risk: Yes  Orientation: A&O x4  ABNL VS/O2: VSS on RA, ex soft BPs  Tele: NA  ABNL Labs: NA  Pain Management: IV dilaudid x2, robaxin x1, oxycodone x2, ice packs  Bowel/Bladder: ha catheter, bowel sounds hypoactive, not passing flatus, no BM  Drains: ha catheter overnight  Lines: PIV infusing LR @ 75 ml/hr, int Abx  Skin: abd laps x6, x1 mini (lower transverse) - SAMANTA w/ surgical glue  Diet: full liquids, denies N/V  Activity Level: x1 gb, pt walked off cart  Tests/Procedures: NA  Anticipated  DC Date: pending  Significant Information:   CRS following. PCDs & capno on overnight, IS education completed and at the bedside.

## 2025-03-14 VITALS
WEIGHT: 178.3 LBS | OXYGEN SATURATION: 100 % | TEMPERATURE: 97.8 F | DIASTOLIC BLOOD PRESSURE: 66 MMHG | RESPIRATION RATE: 18 BRPM | SYSTOLIC BLOOD PRESSURE: 114 MMHG | HEART RATE: 52 BPM | BODY MASS INDEX: 30.44 KG/M2 | HEIGHT: 64 IN

## 2025-03-14 LAB
GLUCOSE BLDC GLUCOMTR-MCNC: 116 MG/DL (ref 70–99)
PATH REPORT.COMMENTS IMP SPEC: NORMAL
PATH REPORT.COMMENTS IMP SPEC: NORMAL
PATH REPORT.FINAL DX SPEC: NORMAL
PATH REPORT.GROSS SPEC: NORMAL
PATH REPORT.MICROSCOPIC SPEC OTHER STN: NORMAL
PATH REPORT.RELEVANT HX SPEC: NORMAL
PHOTO IMAGE: NORMAL

## 2025-03-14 PROCEDURE — 250N000013 HC RX MED GY IP 250 OP 250 PS 637: Performed by: COLON & RECTAL SURGERY

## 2025-03-14 PROCEDURE — 120N000001 HC R&B MED SURG/OB

## 2025-03-14 PROCEDURE — 250N000013 HC RX MED GY IP 250 OP 250 PS 637: Performed by: STUDENT IN AN ORGANIZED HEALTH CARE EDUCATION/TRAINING PROGRAM

## 2025-03-14 PROCEDURE — 250N000011 HC RX IP 250 OP 636: Performed by: COLON & RECTAL SURGERY

## 2025-03-14 PROCEDURE — 250N000013 HC RX MED GY IP 250 OP 250 PS 637

## 2025-03-14 RX ORDER — METHOCARBAMOL 500 MG/1
500 TABLET, FILM COATED ORAL 4 TIMES DAILY
Status: DISCONTINUED | OUTPATIENT
Start: 2025-03-14 | End: 2025-03-15 | Stop reason: HOSPADM

## 2025-03-14 RX ADMIN — OXYCODONE HYDROCHLORIDE 10 MG: 5 TABLET ORAL at 06:40

## 2025-03-14 RX ADMIN — ENOXAPARIN SODIUM 40 MG: 40 INJECTION SUBCUTANEOUS at 09:09

## 2025-03-14 RX ADMIN — CETIRIZINE HYDROCHLORIDE 10 MG: 10 TABLET, FILM COATED ORAL at 09:09

## 2025-03-14 RX ADMIN — METHOCARBAMOL 500 MG: 500 TABLET ORAL at 21:12

## 2025-03-14 RX ADMIN — OXYCODONE HYDROCHLORIDE 5 MG: 5 TABLET ORAL at 19:26

## 2025-03-14 RX ADMIN — ACETAMINOPHEN 975 MG: 325 TABLET, FILM COATED ORAL at 21:11

## 2025-03-14 RX ADMIN — ACETAMINOPHEN 975 MG: 325 TABLET, FILM COATED ORAL at 09:09

## 2025-03-14 RX ADMIN — ACETAMINOPHEN 975 MG: 325 TABLET, FILM COATED ORAL at 15:50

## 2025-03-14 RX ADMIN — METHOCARBAMOL 500 MG: 500 TABLET ORAL at 18:28

## 2025-03-14 RX ADMIN — OXYCODONE HYDROCHLORIDE 10 MG: 5 TABLET ORAL at 10:43

## 2025-03-14 RX ADMIN — METHOCARBAMOL 500 MG: 500 TABLET ORAL at 13:49

## 2025-03-14 RX ADMIN — METHOCARBAMOL 500 MG: 500 TABLET ORAL at 00:28

## 2025-03-14 RX ADMIN — METHOCARBAMOL 500 MG: 500 TABLET ORAL at 09:09

## 2025-03-14 ASSESSMENT — ACTIVITIES OF DAILY LIVING (ADL)
ADLS_ACUITY_SCORE: 34
ADLS_ACUITY_SCORE: 33
ADLS_ACUITY_SCORE: 34
ADLS_ACUITY_SCORE: 33
ADLS_ACUITY_SCORE: 34
ADLS_ACUITY_SCORE: 33
ADLS_ACUITY_SCORE: 33
ADLS_ACUITY_SCORE: 34
ADLS_ACUITY_SCORE: 33
ADLS_ACUITY_SCORE: 34
ADLS_ACUITY_SCORE: 33
ADLS_ACUITY_SCORE: 34
ADLS_ACUITY_SCORE: 33
ADLS_ACUITY_SCORE: 33
ADLS_ACUITY_SCORE: 34
ADLS_ACUITY_SCORE: 33
ADLS_ACUITY_SCORE: 34

## 2025-03-14 NOTE — PROGRESS NOTES
COLON & RECTAL SURGERY  PROGRESS NOTE    March 14, 2025  Post-op Day # 2    SUBJECTIVE:  Still struggling with some pain, more muscular in nature per pt. Tolerating LFD, no n/v. Passed small amount of gas, no BM yet. Urinating without difficulty. VSS.    OBJECTIVE:  Temp:  [97.8  F (36.6  C)-98.4  F (36.9  C)] 97.8  F (36.6  C)  Pulse:  [52-56] 52  Resp:  [16-18] 18  BP: ()/(54-66) 114/66  SpO2:  [95 %-100 %] 100 %    Intake/Output Summary (Last 24 hours) at 3/14/2025 0815  Last data filed at 3/14/2025 0640  Gross per 24 hour   Intake 1173 ml   Output 3575 ml   Net -2402 ml       GENERAL:  Awake, alert, no acute distress  HEAD: Normocephalic atraumatic  SCLERA: Anicteric  EXTREMITIES: Warm and well perfused  ABDOMEN:  Soft, appropriately tender, non-distended. No guarding, rigidity, or peritoneal signs.   INCISION:  C/d/i    LABS:  Lab Results   Component Value Date    WBC 11.3 03/13/2025    WBC 11.2 12/27/2020     Lab Results   Component Value Date    HGB 13.5 03/13/2025    HGB 15.0 12/27/2020     Lab Results   Component Value Date    HCT 39.0 03/13/2025    HCT 44.2 12/27/2020     Lab Results   Component Value Date     03/13/2025     12/27/2020     Last Basic Metabolic Panel:  Lab Results   Component Value Date     03/13/2025     12/27/2020      Lab Results   Component Value Date    POTASSIUM 3.9 03/13/2025    POTASSIUM 4.7 12/27/2020     Lab Results   Component Value Date    CHLORIDE 106 03/13/2025    CHLORIDE 109 12/27/2020     Lab Results   Component Value Date    NATALY 8.7 03/13/2025    NATALY 8.6 12/27/2020     Lab Results   Component Value Date    CO2 24 03/13/2025    CO2 25 12/27/2020     Lab Results   Component Value Date    BUN 4.5 03/13/2025    BUN 8 12/27/2020     Lab Results   Component Value Date    CR 0.63 03/13/2025    CR 0.52 12/27/2020     Lab Results   Component Value Date     03/14/2025    GLC 84 12/27/2020       ASSESSMENT/PLAN: 44F POD#2 s/p robotic sigmoid  colectomy for diverticular disease, recovering appropriately. Anticipate discharge tomorrow vs Sunday pending pain control and more robust ROBF.    - LFD diet  - PRN pain meds. Added scheduled robaxin  - OOB, ambulate  - Await pathology   - Lovenox for ppx. Does not need at discharge.    Discussed with Dr. Gudino.    For questions/paging, please contact the CRS office at [phone].    Terrie Osborne PA-C  Colorectal Physician Assistant    [facility]  [address] [place] [address]  T: [phone]  F: [phone]

## 2025-03-14 NOTE — PLAN OF CARE
Goal Outcome Evaluation:    Goal Outcome Evaluation:       Plan of Care Reviewed With: patient     Overall Patient Progress: improvingOverall Patient Progress: improving     Shift: 3/13/[phone]  Surgery/POD#: POD 1 from a robotic assisted sigmoid colectomy.  Behavior & Aggression: Green  Fall Risk: Yes  Orientation: A&Ox4  ABNL VS/O2: VSS on Rm Air  Tele: NA  ABNL Labs: NA  Pain Management: IV dilaudid x1, PRN oxycodone 10 mg, PRN robaxin, ice packs & ambulation utilized, abd binder also worn   Bowel/Bladder: Not passing flatus, ha removed, voiding spontaneously  Drains: PIV SL to R Arm   Lines: intermittent abx  Skin: abd laps x6, x1 mini (lower transverse) - SAMANTA w/ surgical glue  Diet: low fiber diet, tolerating well  Activity Level: SBA, ambulated x2 to BR, ambulated in hallway x3  Tests/Procedures: NA  Anticipated  DC Date: pending

## 2025-03-14 NOTE — PLAN OF CARE
Date & Time: 3/14 [id number]  Surgery/POD#: POD 2 sigmoid colectomy  Behavior & Aggression: Green  Fall Risk: No  Orientation: A&Ox4  ABNL VS/O2: VSS  ABNL Labs:   Pain Management:Tylenol, robaxin, oxycodone x1  Bowel/Bladder: voiding adequately, no flatus this shift  Drains: NA  Wounds/incisions: lap sites & mini incision WDL, approximated with liquid bandage  Diet:Tolerating low fiber, fair appetite  Activity Level: Independent, ambulated in halls x7  Tests/Procedures: NA  Anticipated  DC Date: Tomorrow V. Sunday pending [place]  Significant Information: showered today

## 2025-03-14 NOTE — PLAN OF CARE
Goal Outcome Evaluation:      Plan of Care Reviewed With: patient    Overall Patient Progress: improvingOverall Patient Progress: improving    Shift: 7p-7a  Surgery/POD#: POD 2 from a robotic assisted sigmoid colectomy.  Behavior & Aggression: Green  Fall Risk: Yes  Orientation: A&O x4  ABNL VS/O2: VSS on RA  Tele: NA  ABNL Labs: WBC 11.3  Pain Management: PRN robaxin x1, PRN oxy x2, heat/ice packs  Bowel/Bladder: voiding in BR, bowel sounds hypoactive, passing minimal flatus, no BM  Drains: NA  Lines: PIV SL'd  Skin: abd laps x6, x1 mini - SAMANTA w/ surgical glue  Diet: low fiber, denies N/V  Activity Level: SBA - could be independent now  Tests/Procedures: NA  Anticipated  DC Date: pending  Significant Information:   CRS following. Pt refused PCDs overnight d/t difficulty sleeping. IS at the bedside.

## 2025-03-15 VITALS
HEART RATE: 68 BPM | RESPIRATION RATE: 16 BRPM | TEMPERATURE: 98.4 F | DIASTOLIC BLOOD PRESSURE: 63 MMHG | WEIGHT: 178.3 LBS | OXYGEN SATURATION: 100 % | SYSTOLIC BLOOD PRESSURE: 108 MMHG | HEIGHT: 64 IN | BODY MASS INDEX: 30.44 KG/M2

## 2025-03-15 LAB
CREAT SERPL-MCNC: 0.55 MG/DL (ref 0.51–0.95)
EGFRCR SERPLBLD CKD-EPI 2021: >90 ML/MIN/1.73M2
ERYTHROCYTE [DISTWIDTH] IN BLOOD BY AUTOMATED COUNT: 12.8 % (ref 10–15)
HCT VFR BLD AUTO: 38 % (ref 35–47)
HGB BLD-MCNC: 13.8 G/DL (ref 11.7–15.7)
MCH RBC QN AUTO: 31.3 PG (ref 26.5–33)
MCHC RBC AUTO-ENTMCNC: 36.3 G/DL (ref 31.5–36.5)
MCV RBC AUTO: 86 FL (ref 78–100)
PLATELET # BLD AUTO: 182 10E3/UL (ref 150–450)
RBC # BLD AUTO: 4.41 10E6/UL (ref 3.8–5.2)
WBC # BLD AUTO: 9.6 10E3/UL (ref 4–11)

## 2025-03-15 PROCEDURE — 250N000013 HC RX MED GY IP 250 OP 250 PS 637: Performed by: COLON & RECTAL SURGERY

## 2025-03-15 PROCEDURE — 82565 ASSAY OF CREATININE: CPT | Performed by: COLON & RECTAL SURGERY

## 2025-03-15 PROCEDURE — 250N000013 HC RX MED GY IP 250 OP 250 PS 637: Performed by: STUDENT IN AN ORGANIZED HEALTH CARE EDUCATION/TRAINING PROGRAM

## 2025-03-15 PROCEDURE — 85027 COMPLETE CBC AUTOMATED: CPT

## 2025-03-15 PROCEDURE — 250N000013 HC RX MED GY IP 250 OP 250 PS 637

## 2025-03-15 PROCEDURE — 250N000011 HC RX IP 250 OP 636: Performed by: COLON & RECTAL SURGERY

## 2025-03-15 PROCEDURE — 36415 COLL VENOUS BLD VENIPUNCTURE: CPT

## 2025-03-15 RX ORDER — OXYCODONE HYDROCHLORIDE 5 MG/1
5 TABLET ORAL EVERY 6 HOURS PRN
Qty: 12 TABLET | Refills: 0 | Status: SHIPPED | OUTPATIENT
Start: 2025-03-15 | End: 2025-03-18

## 2025-03-15 RX ADMIN — METHOCARBAMOL 500 MG: 500 TABLET ORAL at 07:47

## 2025-03-15 RX ADMIN — ACETAMINOPHEN 975 MG: 325 TABLET, FILM COATED ORAL at 07:47

## 2025-03-15 RX ADMIN — OXYCODONE HYDROCHLORIDE 5 MG: 5 TABLET ORAL at 06:58

## 2025-03-15 RX ADMIN — OXYCODONE HYDROCHLORIDE 5 MG: 5 TABLET ORAL at 12:01

## 2025-03-15 RX ADMIN — CETIRIZINE HYDROCHLORIDE 10 MG: 10 TABLET, FILM COATED ORAL at 07:47

## 2025-03-15 RX ADMIN — ENOXAPARIN SODIUM 40 MG: 40 INJECTION SUBCUTANEOUS at 09:49

## 2025-03-15 RX ADMIN — METHOCARBAMOL 500 MG: 500 TABLET ORAL at 12:56

## 2025-03-15 ASSESSMENT — ACTIVITIES OF DAILY LIVING (ADL)
ADLS_ACUITY_SCORE: 32
ADLS_ACUITY_SCORE: 32
ADLS_ACUITY_SCORE: 33
ADLS_ACUITY_SCORE: 33
ADLS_ACUITY_SCORE: 32
ADLS_ACUITY_SCORE: 33
ADLS_ACUITY_SCORE: 32
ADLS_ACUITY_SCORE: 33
ADLS_ACUITY_SCORE: 32

## 2025-03-15 NOTE — DISCHARGE SUMMARY
[facility] Discharge Summary      Andressa Lazar MRN# [id number]   Age: 44 year old YOB: 1980     Date of Admission:  3/12/2025  Date of Discharge::  3/15/25  Admitting Physician:  Estefany Gudino MD  Discharge Physician:  Estefany Gudino MD     PCP:  Clinic, [facility]    Disposition: Patient discharged from [facility] to home in stable condition.        Primary Diagnosis:   Recurrent diverticulitis         Discharge Medications:     Current Discharge Medication List        START taking these medications    Details   oxyCODONE (ROXICODONE) 5 MG tablet Take 1 tablet (5 mg) by mouth every 6 hours as needed for pain.  Qty: 12 tablet, Refills: 0    Associated Diagnoses: Diverticulitis           CONTINUE these medications which have NOT CHANGED    Details   albuterol (PROAIR HFA/PROVENTIL HFA/VENTOLIN HFA) 108 (90 Base) MCG/ACT inhaler Inhale 2 puffs into the lungs every 4 hours as needed for shortness of breath or wheezing.  Qty: 18 g, Refills: 1    Comments: Ok to dispense what ever in stock  Associated Diagnoses: SOB (shortness of breath)      cetirizine (ZYRTEC) 10 MG tablet Take 10 mg by mouth daily.      Cetirizine-Pseudoephedrine (ZYRTEC-D PO) Take 1 tablet by mouth daily as needed. 24hour Zyrtec*      levonorgestrel (MIRENA) 52 MG (20 mcg/day) IUD by Intrauterine route once. Placed 11/2019, [facility]      multivitamin, therapeutic (THERA-VIT) TABS tablet Take 1 tablet by mouth daily      Probiotic Product (PROBIOTIC PO) Take 1 capsule by mouth daily                    Follow Up, Special Instructions:     Discharge diet: Low fiber diet for 4 weeks   Discharge activity: No lifting anything heavier than 10 lbs for 6 weeks.   Discharge follow-up: Follow up with your primary care provider within 1 week.  Follow up with Dr. Gudino (your surgeon) in 3-4 weeks.   Wound care: Your incisions are covered with a skin glue that will fall off on its own  over the next 1-2 weeks. You may shower but do not scrub the incisions.            Procedures:     Procedure(s): Robotic sigmoid colectomy 3/12/25                   Consultations:   None          Brief Hospital Summary:     Patient is a 44 year old patient who underwent robotic sigmoid colectomy on 3/12/25 by Dr. Gudino.   There were no immediate complications during this procedure.    Please refer to the full operative summary for details.  The patient's hospital course was unremarkable.  Pain was controlled on oral pain regimen.  She was tolerating a low fiber diet.  Bowel function had returned prior to discharge.  She recovered as anticipated and experienced no post-operative complications.         Attestation:  I have reviewed today's vital signs, notes, medications, labs and imaging. I have personally seen and examined the patient.    Manjinder Neal MD, MS  Fellow in Colon & Rectal Surgery  [facility]  [address] [address]  T: [phone]  F: [phone]            ADDENDUM:  Length of stay: 3 days  Indicate Y or N for the following:  UTI  No  C diff  No  PNA  No  SSI No  DVT No  PE  No  CVA No  MI No  Enterocutaneous fistula  No  Peripheral nerve injury  No  Abscess (not adjacent to anastomosis)  No  Leak No   Death within 30 days No  Reintubation  No  Reoperation  No   Procedure n/a

## 2025-03-15 NOTE — PLAN OF CARE
Surgery/POD#: POD 3 from a robotic assisted sigmoid colectomy.  Behavior & Aggression: Green  Fall Risk: Yes  Orientation: A&O x4  ABNL VS/O2: VSS on RA  ABNL Labs: see chart  Pain Management: Scheduled tylenol and robaxin   Bowel/Bladder: voiding in BR, passing gas, small BM x3 this shift   Drains: NA  Lines: PIV SL  Skin: abd laps x6, x1 mini   Diet: low fiber, denies N/V  Activity Level: Ind  Tests/Procedures: NA  Anticipated  DC Date: 3/15  Significant Information:

## 2025-03-15 NOTE — PLAN OF CARE
Date & Time: 3/15 0700-discharge  Surgery/POD#: POD 3 sigmoid colectomy  Behavior & Aggression: Green  Fall Risk: No  Orientation: A&Ox4  ABNL VS/O2: VSS  ABNL Labs:   Pain Management:Tylenol, robaxin, oxycodone x1  Bowel/Bladder: voiding adequately, + flatus, BM overnight  Drains: NA  Wounds/incisions: lap sites & mini incision WDL, approximated with liquid bandage  Diet:Tolerating low fiber, fair appetite  Activity Level: Independent, ambulating in halls  Tests/Procedures: NA  Anticipated  DC Date: home today

## 2025-03-17 ENCOUNTER — PATIENT OUTREACH (OUTPATIENT)
Dept: CARE COORDINATION | Facility: CLINIC | Age: 45
End: 2025-03-17
Payer: COMMERCIAL

## 2025-03-17 NOTE — PROGRESS NOTES
"  [facility]: Transitions of Care Outreach  Chief Complaint   Patient presents with    Clinic Care Coordination - Post Hospital       Most Recent Admission Date: 3/12/2025   Most Recent Admission Diagnosis: Diverticulitis of large intestine with abscess - K57.20  Diverticulitis - K57.92     Most Recent Discharge Date: 3/15/2025   Most Recent Discharge Diagnosis: Diverticulitis - K57.92     Transitions of Care Assessment    Discharge Assessment  How are you doing now that you are home?: \" I am doing pretty good \"  How are your symptoms? (Red Flag symptoms escalate to triage hotline per guidelines): Improved  Do you know how to contact your clinic care team if you have future questions or changes to your health status? : Yes  Does the patient have their discharge instructions? : Yes  Does the patient have questions regarding their discharge instructions? : No  Were you started on any new medications or were there changes to any of your previous medications? : Yes  Does the patient have all of their medications?: Yes  Do you have questions regarding any of your medications? : No  Do you have all of your needed medical supplies or equipment (DME)?  (i.e. oxygen tank, CPAP, cane, etc.): Yes    Post-op ([facility] Only)  If the patient had a surgery or procedure, do they have any questions for a nurse?: No             Follow up Plan     Discharge Follow-Up  Discharge follow up appointment scheduled in alignment with recommended follow up timeframe or Transitions of Risk Category? (Low = within 30 days; Moderate= within 14 days; High= within 7 days): Yes  Discharge Follow Up Appointment Date: 04/07/25  Discharge Follow Up Appointment Scheduled with?: Specialty Care Provider    Future Appointments   Date Time Provider Department Center   5/14/2025 11:00 AM Aaseby-Aguilera, Ramona Ann, PA-C [facility]       Outpatient Plan as outlined on AVS reviewed with patient.    For any urgent concerns, please contact our " 24 hour nurse triage line: [phone] ([phone])       RON Mccracken

## 2025-03-28 ENCOUNTER — ANCILLARY ORDERS (OUTPATIENT)
Dept: CT IMAGING | Facility: CLINIC | Age: 45
End: 2025-03-28
Payer: COMMERCIAL

## 2025-03-28 DIAGNOSIS — R10.9 ABDOMINAL PAIN: Primary | ICD-10-CM

## 2025-08-16 ENCOUNTER — HEALTH MAINTENANCE LETTER (OUTPATIENT)
Age: 45
End: 2025-08-16

## (undated) DEVICE — DAVINCI XI REDUCER 8-12MM 470381

## (undated) DEVICE — TAPE CLOTH ADHESIVE 3" LATEX FREE

## (undated) DEVICE — BLADE KNIFE SURG 10 371110

## (undated) DEVICE — KIT SIGMOIDOSCOPE ENDOSCOPIC LIGHTED 18FR KI613/10

## (undated) DEVICE — SUCTION IRR STRYKERFLOW II W/TIP 250-070-520

## (undated) DEVICE — SU PROLENE 2-0 SHDA 48" 8533H

## (undated) DEVICE — SYR BULB IRRIG DOVER 60 ML LATEX FREE 67000

## (undated) DEVICE — DAVINCI XI GRASPER FENESTRATED TIP UP 8MM 470347

## (undated) DEVICE — DAVINCI XI SEAL UNIVERSAL 5-12MM 470500

## (undated) DEVICE — TUBING CONMED AIRSEAL SMOKE EVAC INSUFFLATION ASM-EVAC

## (undated) DEVICE — SU PDS II 0 CTX 60" Z990G

## (undated) DEVICE — DAVINCI HOT SHEARS TIP COVER  400180

## (undated) DEVICE — SUCTION TIP YANKAUER W/O VENT K86

## (undated) DEVICE — CLEANER INST PRE-KLENZ SOAK SHIELD TUBE 6 ML MEDIUM 2D66J4

## (undated) DEVICE — JELLY LUBRICATING SURGILUBE 2OZ TUBE

## (undated) DEVICE — DEVICE SUTURE PASSER 14GA WECK EFX EFXSP2

## (undated) DEVICE — DAVINCI XI FCP BIPOLAR FENESTRATED 470205

## (undated) DEVICE — SOL NACL 0.9% INJ 1000ML BAG 2B1324X

## (undated) DEVICE — GLOVE BIOGEL PI ULTRATOUCH SZ 6.5 41165

## (undated) DEVICE — SUCTION CANISTER MEDIVAC LINER 3000ML W/LID 65651-530

## (undated) DEVICE — DAVINCI XI DRAPE ARM 470015

## (undated) DEVICE — ESU GROUND PAD UNIVERSAL W/O CORD

## (undated) DEVICE — ENDO TROCAR CONMED AIRSEAL BLADELESS 05X120MM IAS5-120LP

## (undated) DEVICE — DAVINCI XI HANDPIECE ESU VESSEL SEALER 8MM EXT 480422

## (undated) DEVICE — LINEN TOWEL PACK X5 5464

## (undated) DEVICE — NDL INSUFFLATION 13GA 120MM C2201

## (undated) DEVICE — SOL NACL 0.9% IRRIG 1000ML BOTTLE 2F7124

## (undated) DEVICE — GLOVE BIOGEL PI MICRO INDICATOR UNDERGLOVE SZ 6.5 48965

## (undated) DEVICE — KIT ENDO TURNOVER/PROCEDURE W/CLEAN A SCOPE LINERS 103888

## (undated) DEVICE — JELLY LUBRICATING SURGILUBE 4OZ TUBE

## (undated) DEVICE — STPL DAVINCI SUREFORM 30X8MM 488530

## (undated) DEVICE — SUCTION TIP POOLE K770

## (undated) DEVICE — KIT PATIENT POSITIONING PIGAZZI LATEX FREE 40580

## (undated) DEVICE — DAVINCI XI MONOPOLAR SCISSORS HOT SHEARS 8MM 470179

## (undated) DEVICE — WIPES FOLEY CARE SURESTEP PROVON DFC100

## (undated) DEVICE — DAVINCI XI OBTURATOR BLADELESS 8MM 470359

## (undated) DEVICE — SPONGE RAY-TEC 4X8" 7318

## (undated) DEVICE — RULER SURGICAL PLASTIC STRL LF CS628

## (undated) DEVICE — SU VICRYL 2-0 SH 27" J317H

## (undated) DEVICE — ANTIFOG SOLUTION SEE SHARP 150M TROCAR SWABS 30978 (COI)

## (undated) DEVICE — STPL DAVINCI SUREFORM 30MM RELOAD BLUE 48230B

## (undated) DEVICE — Device

## (undated) DEVICE — SUCTION MANIFOLD NEPTUNE 2 SYS 4 PORT 0702-020-000

## (undated) DEVICE — DAVINCI XI DRAPE COLUMN 470341

## (undated) DEVICE — TUBING SUCTION MEDI-VAC SOFT 3/16"X20' N520A

## (undated) DEVICE — STPL CIRCULAR 29MM CVD CDH29P

## (undated) DEVICE — CATH TRAY FOLEY SURESTEP 16FR WDRAIN BAG STLK LATEX A300316A

## (undated) DEVICE — PACK DAVINCI UROLOGY SBA15UDFSG

## (undated) RX ORDER — HYDROMORPHONE HCL IN WATER/PF 6 MG/30 ML
PATIENT CONTROLLED ANALGESIA SYRINGE INTRAVENOUS
Status: DISPENSED
Start: 2025-03-12

## (undated) RX ORDER — HYDROMORPHONE HYDROCHLORIDE 1 MG/ML
INJECTION, SOLUTION INTRAMUSCULAR; INTRAVENOUS; SUBCUTANEOUS
Status: DISPENSED
Start: 2025-03-12

## (undated) RX ORDER — ONDANSETRON 2 MG/ML
INJECTION INTRAMUSCULAR; INTRAVENOUS
Status: DISPENSED
Start: 2025-03-12

## (undated) RX ORDER — FENTANYL CITRATE 50 UG/ML
INJECTION, SOLUTION INTRAMUSCULAR; INTRAVENOUS
Status: DISPENSED
Start: 2025-03-12

## (undated) RX ORDER — ACETAMINOPHEN 325 MG/1
TABLET ORAL
Status: DISPENSED
Start: 2025-03-12

## (undated) RX ORDER — METRONIDAZOLE 500 MG/100ML
INJECTION, SOLUTION INTRAVENOUS
Status: DISPENSED
Start: 2025-03-12

## (undated) RX ORDER — GLYCOPYRROLATE 0.2 MG/ML
INJECTION, SOLUTION INTRAMUSCULAR; INTRAVENOUS
Status: DISPENSED
Start: 2025-03-12

## (undated) RX ORDER — SIMETHICONE 40MG/0.6ML
SUSPENSION, DROPS(FINAL DOSAGE FORM)(ML) ORAL
Status: DISPENSED
Start: 2025-03-11

## (undated) RX ORDER — FENTANYL CITRATE 0.05 MG/ML
INJECTION, SOLUTION INTRAMUSCULAR; INTRAVENOUS
Status: DISPENSED
Start: 2025-03-12

## (undated) RX ORDER — HEPARIN SODIUM 5000 [USP'U]/.5ML
INJECTION, SOLUTION INTRAVENOUS; SUBCUTANEOUS
Status: DISPENSED
Start: 2025-03-12

## (undated) RX ORDER — FENTANYL CITRATE 50 UG/ML
INJECTION, SOLUTION INTRAMUSCULAR; INTRAVENOUS
Status: DISPENSED
Start: 2025-03-11